# Patient Record
Sex: FEMALE | Race: WHITE | NOT HISPANIC OR LATINO | Employment: PART TIME | URBAN - METROPOLITAN AREA
[De-identification: names, ages, dates, MRNs, and addresses within clinical notes are randomized per-mention and may not be internally consistent; named-entity substitution may affect disease eponyms.]

---

## 2021-11-24 ENCOUNTER — HOSPITAL ENCOUNTER (EMERGENCY)
Facility: HOSPITAL | Age: 86
Discharge: HOME | End: 2021-11-24
Attending: EMERGENCY MEDICINE | Admitting: EMERGENCY MEDICINE

## 2021-11-24 ENCOUNTER — APPOINTMENT (OUTPATIENT)
Dept: CT IMAGING | Facility: HOSPITAL | Age: 86
End: 2021-11-24

## 2021-11-24 ENCOUNTER — APPOINTMENT (OUTPATIENT)
Dept: GENERAL RADIOLOGY | Facility: HOSPITAL | Age: 86
End: 2021-11-24

## 2021-11-24 ENCOUNTER — HOSPITAL ENCOUNTER (EMERGENCY)
Facility: HOSPITAL | Age: 86
End: 2021-11-24

## 2021-11-24 VITALS
HEIGHT: 60 IN | BODY MASS INDEX: 25.52 KG/M2 | SYSTOLIC BLOOD PRESSURE: 173 MMHG | OXYGEN SATURATION: 97 % | WEIGHT: 130 LBS | DIASTOLIC BLOOD PRESSURE: 47 MMHG | HEART RATE: 52 BPM | RESPIRATION RATE: 15 BRPM | TEMPERATURE: 98.7 F

## 2021-11-24 DIAGNOSIS — S02.2XXA CLOSED FRACTURE OF NASAL BONE, INITIAL ENCOUNTER: ICD-10-CM

## 2021-11-24 DIAGNOSIS — S00.03XA CONTUSION OF SCALP, INITIAL ENCOUNTER: ICD-10-CM
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
## 2021-11-24 DIAGNOSIS — S42.472A CLOSED DISPLACED TRANSCONDYLAR FRACTURE OF LEFT HUMERUS, INITIAL ENCOUNTER: ICD-10-CM

## 2021-11-24 PROCEDURE — 96374 THER/PROPH/DIAG INJ IV PUSH: CPT

## 2021-11-24 PROCEDURE — 70486 CT MAXILLOFACIAL W/O DYE: CPT

## 2021-11-24 PROCEDURE — 99283 EMERGENCY DEPT VISIT LOW MDM: CPT

## 2021-11-24 PROCEDURE — 73060 X-RAY EXAM OF HUMERUS: CPT

## 2021-11-24 PROCEDURE — 72125 CT NECK SPINE W/O DYE: CPT

## 2021-11-24 PROCEDURE — 70450 CT HEAD/BRAIN W/O DYE: CPT

## 2021-11-24 PROCEDURE — 25010000002 MORPHINE PER 10 MG: Performed by: EMERGENCY MEDICINE

## 2021-11-24 PROCEDURE — 25010000002 ONDANSETRON PER 1 MG: Performed by: EMERGENCY MEDICINE

## 2021-11-24 PROCEDURE — 99284 EMERGENCY DEPT VISIT MOD MDM: CPT

## 2021-11-24 PROCEDURE — 96375 TX/PRO/DX INJ NEW DRUG ADDON: CPT

## 2021-11-24 PROCEDURE — 73200 CT UPPER EXTREMITY W/O DYE: CPT

## 2021-11-24 PROCEDURE — 96376 TX/PRO/DX INJ SAME DRUG ADON: CPT

## 2021-11-24 PROCEDURE — 73030 X-RAY EXAM OF SHOULDER: CPT

## 2021-11-24 PROCEDURE — 0 MORPHINE SULFATE 4 MG/ML SOLUTION: Performed by: EMERGENCY MEDICINE

## 2021-11-24 RX ORDER — ONDANSETRON 2 MG/ML
4 INJECTION INTRAMUSCULAR; INTRAVENOUS ONCE
Status: COMPLETED | OUTPATIENT
Start: 2021-11-24 | End: 2021-11-24

## 2021-11-24 RX ORDER — MORPHINE SULFATE 4 MG/ML
2 INJECTION, SOLUTION INTRAMUSCULAR; INTRAVENOUS ONCE
Status: COMPLETED | OUTPATIENT
Start: 2021-11-24 | End: 2021-11-24

## 2021-11-24 RX ORDER — HYDROCODONE BITARTRATE AND ACETAMINOPHEN 5; 325 MG/1; MG/1
1 TABLET ORAL EVERY 6 HOURS PRN
Qty: 12 TABLET | Refills: 0 | Status: SHIPPED | OUTPATIENT
Start: 2021-11-24 | End: 2022-02-14

## 2021-11-24 RX ORDER — SODIUM CHLORIDE 0.9 % (FLUSH) 0.9 %
10 SYRINGE (ML) INJECTION AS NEEDED
Status: DISCONTINUED | OUTPATIENT
Start: 2021-11-24 | End: 2021-11-24 | Stop reason: HOSPADM

## 2021-11-24 RX ORDER — ONDANSETRON 4 MG/1
4 TABLET, ORALLY DISINTEGRATING ORAL EVERY 6 HOURS PRN
Qty: 12 TABLET | Refills: 0 | Status: SHIPPED | OUTPATIENT
Start: 2021-11-24 | End: 2022-02-14

## 2021-11-24 RX ADMIN — MORPHINE SULFATE 2 MG: 4 INJECTION INTRAVENOUS at 11:33

## 2021-11-24 RX ADMIN — ONDANSETRON 4 MG: 2 INJECTION INTRAMUSCULAR; INTRAVENOUS at 13:24

## 2021-11-24 RX ADMIN — MORPHINE SULFATE 2 MG: 4 INJECTION INTRAVENOUS at 14:28

## 2021-11-24 RX ADMIN — ONDANSETRON 4 MG: 2 INJECTION INTRAMUSCULAR; INTRAVENOUS at 11:32

## 2021-11-24 NOTE — ED NOTES
Pt fell at grocery store and landed on her shoulder. Pt states her dentures also hit the top of her mouth due to the impact. Pt did hit her head and had a bloody nose, no longer bleeding. Pt alert x4.     Priscila Yun RN  11/24/21 3737

## 2021-11-24 NOTE — DISCHARGE INSTRUCTIONS
Sling no use left arm ice packs  Follow-up with Dr. Flores on Monday at 3:45 PM.  Follow-up with [facility] for nasal fracture next week.  Return for uncontrolled pain fever vomiting altered mental status unable holding down any other new or worsening problems or concerns.  Zofran and Norco sent to your pharmacy.  Showell will make you sleepy he will be a fall risk it will make you constipated increase fluid fibers.  Return for any other new or worsening problems or concerns

## 2021-11-24 NOTE — ED PROVIDER NOTES
Subjective   Chief complaint fall left shoulder pain    History of present illness 88-year-old female was at the grocery store and she was reaching for some cream cheese when she lost her balance and fell landing on her left shoulder hitting her head and nose.  No loss of consciousness.  But complains of severe pain to the left shoulder.  No headache no neck pain numbness or tingling no chest or abdominal pain no syncope.  States she has been feeling fine otherwise.  No previous shoulder injury.  Pain is severe sharp stabbing rates down her arm continuous last couple hours worse with movement better with rest with the above associated symptoms.  Patient takes no anticoagulant          Review of Systems   Constitutional: Negative for chills and fever.   Respiratory: Negative for chest tightness and shortness of breath.    Cardiovascular: Negative for chest pain and leg swelling.   Gastrointestinal: Negative for abdominal pain and vomiting.   Endocrine: Negative for cold intolerance and heat intolerance.   Musculoskeletal: Positive for arthralgias. Negative for back pain and neck pain.   Skin: Negative for color change and rash.   Neurological: Negative for dizziness and headaches.   Psychiatric/Behavioral: Negative for agitation and behavioral problems.       No past medical history on file.    No Known Allergies    No past surgical history on file.    No family history on file.    Social History     Socioeconomic History   • Marital status:      Social history no tobacco alcohol or drug  Prior to Admission medications    Medication Sig Start Date End Date Taking? Authorizing Provider   HYDROcodone-acetaminophen (NORCO) 5-325 MG per tablet Take 1 tablet by mouth Every 6 (Six) Hours As Needed for Severe Pain . 11/24/21   Ronen Yi MD   ondansetron ODT (ZOFRAN-ODT) 4 MG disintegrating tablet Place 1 tablet on the tongue Every 6 (Six) Hours As Needed for Nausea. 11/24/21   Ronen Yi MD          Objective   Physical Exam  88-year-old female awake alert no acute distress HEENT extraocular muscles intact pupils equal and react there is no raccoon or colby sign there is no facial pain or mandible pain she has some mild nasal tenderness and some dried blood in the left nares but no septal hematomas noted bilateral.  Patient has a small superficial laceration to the inside upper lip no need for sutures patient has a some dentures in but does remove there is no other gum or palate injury.  Speech normal no trismus.  No direct cervical thoracic lumbar spine tenderness noted.  Trachea line without JVD or bruits chest wall nontender good breath sounds bilaterally heart regular without murmur abdomen soft without tenderness no bruising.  Pelvis to stay without pain extremities right arm and legs full range of motion no obvious deformity.  Patient has lot of pain to the left shoulder with some swelling noted but no pain to the elbow or to the wrist  strength normal.  She can move her wrist fingers thumb through all range of motion without difficulty she is distally neurovascular motor sensor intact including deltoid tricep forearm hand area no evidence of compartment syndrome.  The patient neurologic awake alert orientated x4 with [place] Coma Scale 15  Procedures           ED Course      No results found for this or any previous visit.  XR Shoulder 2+ View Left    Result Date: 11/24/2021  Acute comminuted mildly impacted and medially displaced humeral head and neck fracture. As described above. An obvious glenoid fracture is not seen on this exam. The remainder of the humerus is intact. No dislocation is demonstrated. Consider CT for better evaluation of the alignment of fracture fragments.  Electronically Signed By-Wilber Pineda DO On:11/24/2021 11:54 AM This report was finalized on [id number] by  Wilber Pineda DO.    XR Humerus Left    Result Date: 11/24/2021  Acute comminuted mildly impacted and  medially displaced humeral head and neck fracture. As described above. An obvious glenoid fracture is not seen on this exam. The remainder of the humerus is intact. No dislocation is demonstrated. Consider CT for better evaluation of the alignment of fracture fragments.  Electronically Signed By-Wilber Pineda DO On:11/24/2021 11:54 AM This report was finalized on [id number] by  Wilber Pineda DO.    CT Head Without Contrast    Result Date: 11/24/2021  Head: No evidence of trauma. No acute abnormality. No intracranial hemorrhage. Facial bones: Small left-sided nasal bone fracture, 1 mm displacement. This of indeterminate age. No acute abnormality elsewhere, no fracture elsewhere. Cervical spine: No fracture or other acute abnormality. Electronically Signed: Alvaro Bergman MD 11/24/2021 12:33 EST    CT Cervical Spine Without Contrast    Result Date: 11/24/2021  Head: No evidence of trauma. No acute abnormality. No intracranial hemorrhage. Facial bones: Small left-sided nasal bone fracture, 1 mm displacement. This of indeterminate age. No acute abnormality elsewhere, no fracture elsewhere. Cervical spine: No fracture or other acute abnormality. Electronically Signed: Alvaro Bergman MD 11/24/2021 12:33 EST    CT Facial Bones Without Contrast    Result Date: 11/24/2021  Head: No evidence of trauma. No acute abnormality. No intracranial hemorrhage. Facial bones: Small left-sided nasal bone fracture, 1 mm displacement. This of indeterminate age. No acute abnormality elsewhere, no fracture elsewhere. Cervical spine: No fracture or other acute abnormality. Electronically Signed: Alvaro Bergman MD 11/24/2021 12:33 EST    CT Upper Extremity Left Without Contrast    Result Date: 11/24/2021  Comminuted displaced fracture of the proximal humerus, as detailed above. Electronically Signed: Justin Najera MD 11/24/2021 14:06 EST    Medications   sodium chloride 0.9 % flush 10 mL (has no administration in time range)   Morphine sulfate  (PF) injection 2 mg (2 mg Intravenous Given 11/24/21 1133)   ondansetron (ZOFRAN) injection 4 mg (4 mg Intravenous Given 11/24/21 1132)   ondansetron (ZOFRAN) injection 4 mg (4 mg Intravenous Given 11/24/21 1324)   Morphine sulfate (PF) injection 2 mg (2 mg Intravenous Given 11/24/21 1428)                                            MDM  Number of Diagnoses or Management Options  Closed displaced transcondylar fracture of left humerus, initial encounter: new and requires workup  Closed fracture of nasal bone, initial encounter: new and requires workup  Contusion of scalp, initial encounter: new and requires workup  Fall, initial encounter: new and requires workup  Diagnosis management comments: Medical decision making.  Patient IV established placed on a monitor given morphine 2 IV and Zofran 4 IV she still remained pain and nausea he had that repeated and is helped tremendously.  The patient had a CT head without without acute findings other than a left-sided nasal bone fracture with 1 mm displacement.  The cervical spine without fracture no other facial fractures are noted CT of those were obtained.  The patient x-ray of the shoulder which showed a comminuted proximal humerus fracture without dislocation but I talked to Dr. Flores on the phone.  Requested the CT scan is obtained showed comminuted displaced fracture of proximal humerus some mild subluxation.  The patient was feeling much better we did talk about admission to the hospital for pain control orthopedic consultation.  This is versus outpatient after shared medical decision with her and her family.  Patient desires to go home as tomorrow is Thanksgiving.  She remained distal neurovascular sensory intact we talked about the fracture and the need for follow-up and possible surgical intervention.  We scheduled her appointment on Monday with Dr. Flores at 3:45 PM.  She was placed in a sling and swath she was referred [facility] next week for her nasal bone  fracture I talked to the family the patient greatly about what to return for she was discharged home for outpatient management follow-up stable otherwise unremarkable ER course.  He remained awake alert and oriented x3 with [place] Coma Scale 15       Amount and/or Complexity of Data Reviewed  Tests in the radiology section of CPT®: reviewed  Discuss the patient with other providers: yes    Risk of Complications, Morbidity, and/or Mortality  Presenting problems: moderate  Diagnostic procedures: moderate  Management options: moderate    Patient Progress  Patient progress: stable      Final diagnoses:   Fall, initial encounter   Contusion of scalp, initial encounter   Closed displaced transcondylar fracture of left humerus, initial encounter   Closed fracture of nasal bone, initial encounter       ED Disposition  ED Disposition     ED Disposition Condition Comment    Discharge Stable           Ronen Flores MD  [facility] [place] [address] [phone]    In 5 days  Monday 3:45 PM    [facility]  [address]  [facility] [address]  [phone]  In 1 week  For nasal fracture         Medication List      New Prescriptions    HYDROcodone-acetaminophen 5-325 MG per tablet  Commonly known as: NORCO  Take 1 tablet by mouth Every 6 (Six) Hours As Needed for Severe Pain .     ondansetron ODT 4 MG disintegrating tablet  Commonly known as: ZOFRAN-ODT  Place 1 tablet on the tongue Every 6 (Six) Hours As Needed for Nausea.           Where to Get Your Medications      These medications were sent to [facility]/pharmacy #[id number] - [place], [place] - [address] - [phone]  - [phone]  [address] [place] [address]    Phone: [phone]   · HYDROcodone-acetaminophen 5-325 MG per tablet  · ondansetron ODT 4 MG disintegrating tablet          Ronen Yi MD  11/24/21 5918

## 2021-11-30 ENCOUNTER — LAB (OUTPATIENT)
Dept: LAB | Facility: HOSPITAL | Age: 86
End: 2021-11-30

## 2021-11-30 ENCOUNTER — OFFICE VISIT (OUTPATIENT)
Dept: CARDIOLOGY | Facility: CLINIC | Age: 86
End: 2021-11-30

## 2021-11-30 VITALS
DIASTOLIC BLOOD PRESSURE: 84 MMHG | WEIGHT: 125 LBS | HEIGHT: 60 IN | BODY MASS INDEX: 24.54 KG/M2 | HEART RATE: 60 BPM | SYSTOLIC BLOOD PRESSURE: 136 MMHG

## 2021-11-30 DIAGNOSIS — S42.412S CLOSED SUPRACONDYLAR FRACTURE OF LEFT HUMERUS, SEQUELA: ICD-10-CM

## 2021-11-30 DIAGNOSIS — I25.10 CORONARY ARTERY DISEASE INVOLVING NATIVE CORONARY ARTERY OF NATIVE HEART WITHOUT ANGINA PECTORIS: ICD-10-CM

## 2021-11-30 DIAGNOSIS — I10 BENIGN ESSENTIAL HTN: ICD-10-CM

## 2021-11-30 DIAGNOSIS — Z01.810 PREOP CARDIOVASCULAR EXAM: Primary | ICD-10-CM

## 2021-11-30 DIAGNOSIS — E78.2 MIXED HYPERLIPIDEMIA: ICD-10-CM

## 2021-11-30 PROBLEM — S42.412A CLOSED SUPRACONDYLAR FRACTURE OF LEFT HUMERUS: Status: ACTIVE | Noted: 2021-11-30

## 2021-11-30 PROBLEM — M25.529 ELBOW JOINT PAIN: Status: ACTIVE | Noted: 2021-11-30

## 2021-11-30 PROBLEM — M19.90 OSTEOARTHRITIS: Status: ACTIVE | Noted: 2021-11-30

## 2021-11-30 PROBLEM — E78.5 HYPERLIPIDEMIA: Status: ACTIVE | Noted: 2021-11-30

## 2021-11-30 PROBLEM — H61.20 IMPACTED CERUMEN: Status: ACTIVE | Noted: 2021-11-30

## 2021-11-30 LAB
ANION GAP SERPL CALCULATED.3IONS-SCNC: 9.4 MMOL/L (ref 5–15)
BUN SERPL-MCNC: 28 MG/DL (ref 8–23)
BUN/CREAT SERPL: 31.5 (ref 7–25)
CALCIUM SPEC-SCNC: 9.2 MG/DL (ref 8.6–10.5)
CHLORIDE SERPL-SCNC: 99 MMOL/L (ref 98–107)
CO2 SERPL-SCNC: 31.6 MMOL/L (ref 22–29)
CREAT SERPL-MCNC: 0.89 MG/DL (ref 0.57–1)
DEPRECATED RDW RBC AUTO: 42.2 FL (ref 37–54)
ERYTHROCYTE [DISTWIDTH] IN BLOOD BY AUTOMATED COUNT: 12.9 % (ref 12.3–15.4)
GFR SERPL CREATININE-BSD FRML MDRD: 60 ML/MIN/1.73
GLUCOSE SERPL-MCNC: 106 MG/DL (ref 65–99)
HCT VFR BLD AUTO: 27.9 % (ref 34–46.6)
HGB BLD-MCNC: 9.6 G/DL (ref 12–15.9)
MCH RBC QN AUTO: 31.6 PG (ref 26.6–33)
MCHC RBC AUTO-ENTMCNC: 34.4 G/DL (ref 31.5–35.7)
MCV RBC AUTO: 91.8 FL (ref 79–97)
PLATELET # BLD AUTO: 219 10*3/MM3 (ref 140–450)
PMV BLD AUTO: 12.2 FL (ref 6–12)
POTASSIUM SERPL-SCNC: 3.8 MMOL/L (ref 3.5–5.2)
RBC # BLD AUTO: 3.04 10*6/MM3 (ref 3.77–5.28)
SARS-COV-2 ORF1AB RESP QL NAA+PROBE: NOT DETECTED
SODIUM SERPL-SCNC: 140 MMOL/L (ref 136–145)
WBC NRBC COR # BLD: 10.29 10*3/MM3 (ref 3.4–10.8)

## 2021-11-30 PROCEDURE — U0004 COV-19 TEST NON-CDC HGH THRU: HCPCS

## 2021-11-30 PROCEDURE — 80048 BASIC METABOLIC PNL TOTAL CA: CPT

## 2021-11-30 PROCEDURE — U0005 INFEC AGEN DETEC AMPLI PROBE: HCPCS

## 2021-11-30 PROCEDURE — 85027 COMPLETE CBC AUTOMATED: CPT

## 2021-11-30 PROCEDURE — 99204 OFFICE O/P NEW MOD 45 MIN: CPT | Performed by: NURSE PRACTITIONER

## 2021-11-30 PROCEDURE — 93000 ELECTROCARDIOGRAM COMPLETE: CPT | Performed by: NURSE PRACTITIONER

## 2021-11-30 PROCEDURE — C9803 HOPD COVID-19 SPEC COLLECT: HCPCS

## 2021-11-30 RX ORDER — METOPROLOL TARTRATE 50 MG/1
50 TABLET, FILM COATED ORAL 2 TIMES DAILY
COMMUNITY

## 2021-11-30 RX ORDER — HYDROCHLOROTHIAZIDE 25 MG/1
25 TABLET ORAL DAILY
COMMUNITY
End: 2022-02-14

## 2021-11-30 RX ORDER — LISINOPRIL 10 MG/1
10 TABLET ORAL 2 TIMES DAILY
COMMUNITY

## 2021-11-30 RX ORDER — MECLIZINE HYDROCHLORIDE 25 MG/1
12.5 TABLET ORAL AS NEEDED
COMMUNITY
End: 2021-11-30

## 2021-11-30 RX ORDER — DORZOLAMIDE HYDROCHLORIDE AND TIMOLOL MALEATE 20; 5 MG/ML; MG/ML
SOLUTION/ DROPS OPHTHALMIC
COMMUNITY
End: 2022-02-21

## 2021-11-30 RX ORDER — LATANOPROST 50 UG/ML
SOLUTION/ DROPS OPHTHALMIC
COMMUNITY

## 2021-11-30 RX ORDER — TIMOLOL MALEATE 5 MG/ML
SOLUTION/ DROPS OPHTHALMIC
COMMUNITY

## 2021-11-30 RX ORDER — ATORVASTATIN CALCIUM 40 MG/1
40 TABLET, FILM COATED ORAL NIGHTLY
COMMUNITY

## 2021-11-30 RX ORDER — ASPIRIN 81 MG/1
81 TABLET ORAL DAILY
COMMUNITY

## 2021-11-30 NOTE — PROGRESS NOTES
Cardiology Office New Patient Visit      Primary Care Provider:  Ranulfo Mims MD    Reason for Visit:     Preoperative cardiovascular risk assessment requested  Left humerus fracture  Coronary artery disease  Hypertension  Dyslipidemia      Subjective     CC: Left arm pain    History of Present Illness       Amanda Brown is a 88 y.o. female.  Patient is a very pleasant 88-year-old female who presents today for evaluation and requesting preoperative cardiovascular risk assessment prior to upcoming left humerus fracture repair that is scheduled for Thursday.    The patient was at a grocery store last week and was attempting to buy some cream cheese when she lost her balance and fell landing on her left shoulder and hitting her head and nose.  She denies any lightheadedness, dizziness, near syncope or syncope prior to the event.  She did not lose consciousness when she hit the ground.  She was complaining of severe pain in her arm and brought to the emergency room for evaluation.    Patient was found to have a comminuted displaced fracture of the proximal humerus.  In addition she was found to have a small left-sided nasal bone fracture with 1 mm of displacement    Patient was discharged home and had follow-up yesterday with Dr. Flores who scheduled her surgery for her left humerus fracture repair on Thursday this week.    The patient reports that she had four-vessel bypass surgery in 2009.  She denies any current or recent chest pain, dyspnea, PND, orthopnea, palpitations, near syncope, lower extremity edema or feelings of her heart racing.  She reports compliance with medical therapy.    She previously followed with Dr. Willard back but since he quit going to [place] she has not had any recent cardiac evaluation.    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    Prior to her bypass surgery she was having symptoms of severe shortness of breath with exertion and she says she has  not had any recent similar symptoms.    EKG is reviewed and shows sinus rhythm with a right bundle branch block.  Heart rate is 60        Past Medical History:   Diagnosis Date   • Coronary artery disease    • GERD (gastroesophageal reflux disease)    • Hyperlipidemia    • Hypertension    • PONV (postoperative nausea and vomiting)        Past Surgical History:   Procedure Laterality Date   • CARDIAC SURGERY  2009    CABG 4V         Current Outpatient Medications:   •  aspirin (aspirin) 81 MG EC tablet, Take 81 mg by mouth Daily. LD 11/29, Disp: , Rfl:   •  atorvastatin (LIPITOR) 40 MG tablet, Take 40 mg by mouth Every Night., Disp: , Rfl:   •  ciclopirox (PENLAC) 8 % solution, ciclopirox 8 % topical solution  PLEASE SEE ATTACHED FOR DETAILED DIRECTIONS, Disp: , Rfl:   •  dorzolamide-timolol (COSOPT) 22.3-6.8 MG/ML ophthalmic solution, dorzolamide 22.3 mg-timolol 6.8 mg/mL eye drops, Disp: , Rfl:   •  hydroCHLOROthiazide (HYDRODIURIL) 25 MG tablet, Take 25 mg by mouth Daily. Hold DOS, Disp: , Rfl:   •  HYDROcodone-acetaminophen (NORCO) 5-325 MG per tablet, Take 1 tablet by mouth Every 6 (Six) Hours As Needed for Severe Pain ., Disp: 12 tablet, Rfl: 0  •  latanoprost (XALATAN) 0.005 % ophthalmic solution, latanoprost 0.005 % eye drops, Disp: , Rfl:   •  lisinopril (PRINIVIL,ZESTRIL) 10 MG tablet, Take 10 mg by mouth 2 (Two) Times a Day. Hold 24 hours  LD 11/30, Disp: , Rfl:   •  metoprolol tartrate (LOPRESSOR) 50 MG tablet, Take 50 mg by mouth 2 (Two) Times a Day. Take DOS, Disp: , Rfl:   •  ondansetron ODT (ZOFRAN-ODT) 4 MG disintegrating tablet, Place 1 tablet on the tongue Every 6 (Six) Hours As Needed for Nausea., Disp: 12 tablet, Rfl: 0  •  timolol (TIMOPTIC) 0.5 % ophthalmic solution, timolol maleate 0.5 % eye drops, Disp: , Rfl:     Social History     Socioeconomic History   • Marital status:    Tobacco Use   • Smoking status: Never Smoker   • Smokeless tobacco: Never Used   Vaping Use   • Vaping Use:  "Never used   Substance and Sexual Activity   • Alcohol use: Never   • Drug use: Never   • Sexual activity: Defer       History reviewed. No pertinent family history.    The following portions of the patient's history were reviewed and updated as appropriate: allergies, current medications, past family history, past medical history, past social history, past surgical history and problem list.    Review of Systems   Constitutional: Negative for decreased appetite and diaphoresis.   HENT: Negative for congestion, hearing loss and nosebleeds.    Cardiovascular: Negative for chest pain, claudication, dyspnea on exertion, irregular heartbeat, leg swelling, near-syncope, orthopnea, palpitations, paroxysmal nocturnal dyspnea and syncope.   Respiratory: Negative for cough, shortness of breath and sleep disturbances due to breathing.    Endocrine: Negative for polyuria.   Hematologic/Lymphatic: Does not bruise/bleed easily.   Skin: Negative for itching and rash.   Musculoskeletal: Positive for arthritis, falls, joint pain and joint swelling. Negative for back pain, muscle weakness and myalgias.   Gastrointestinal: Negative for abdominal pain, change in bowel habit and nausea.   Genitourinary: Negative for dysuria, flank pain, frequency and hesitancy.   Neurological: Negative for dizziness, tremors and weakness.   Psychiatric/Behavioral: Negative for altered mental status. The patient does not have insomnia.        /84   Pulse 60   Ht 152.4 cm (60\")   Wt 56.7 kg (125 lb)   BMI 24.41 kg/m² .    Body mass index is 24.41 kg/m².    Objective     Vitals reviewed.   Constitutional:       General: Not in acute distress.     Appearance: Normal appearance. Well-developed.   Eyes:      Pupils: Pupils are equal, round, and reactive to light.   HENT:      Head: Normocephalic and atraumatic.   Neck:      Vascular: No JVD.   Pulmonary:      Effort: Pulmonary effort is normal.      Breath sounds: Normal breath sounds. "   Cardiovascular:      Normal rate. Regular rhythm.   Pulses:     Intact distal pulses.   Edema:     Peripheral edema absent.   Abdominal:      General: There is no distension.      Palpations: Abdomen is soft.      Tenderness: There is no abdominal tenderness.   Musculoskeletal: Normal range of motion.      Left upper arm: Swelling present.      Cervical back: Normal range of motion and neck supple. Skin:     General: Skin is warm and dry.   Neurological:      Mental Status: Alert and oriented to person, place, and time.             ECG 12 Lead    Date/Time: 11/30/2021 3:32 PM  Performed by: Olga Lidia Russell APRN  Authorized by: Olga Lidia Russell APRN   Comparison: not compared with previous ECG   Rhythm: sinus rhythm  BPM: 60  Conduction: right bundle branch block  ST Segments: ST segments normal  T Waves: T waves normal    Clinical impression: abnormal EKG                  Diagnoses and all orders for this visit:    1. Preop cardiovascular exam (Primary)  Comments:  Preoperative cardiovascular risk assessment requested prior to upcoming surgical repair of a left humerus fracture    2. Closed supracondylar fracture of left humerus, sequela    3. Coronary artery disease involving native coronary artery of native heart without angina pectoris  Comments:  Patient had multivessel bypass surgery in 2009.  She has not had recent cardiac follow-up.  No signs or symptoms to suggest unstable angina    4. Benign essential HTN  Comments:  Stable on current medical therapy    5. Mixed hyperlipidemia  Comments:  Treated with statin therapy              MEDICAL DECISION MAKING:          Patient is here requesting preoperative cardiovascular risk assessment for repair of comminuted displaced proximal humerus left fracture.  It is scheduled tentatively for Thursday.    The patient has known coronary artery disease.  She is having no signs or symptoms to suggest unstable angina, MI or CHF.    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  She is having none of the similar symptoms she had prior to her bypass surgery in 2009.    EKG shows sinus rhythm with a right bundle branch block there are no ST or T wave segment abnormalities.    The patient has not had any recent noninvasive ischemic evaluation.    However due to no recent signs or symptoms to suggest heart failure, unstable angina or recent MI I do think she is an acceptable risk to proceed with surgery for her displaced comminuted proximal humerus fracture which is scheduled for Thursday.    I have however encouraged her to follow-up with Dr. Aceves in our [place] clinic in a few months postoperatively to get back in a schedule of regular follow-up with cardiology and to consider noninvasive ischemic evaluation at that time.    This was discussed with the patient and her family who are in agreement to this plan.

## 2021-12-02 ENCOUNTER — OFFICE VISIT (OUTPATIENT)
Dept: ORTHOPEDIC SURGERY | Facility: CLINIC | Age: 86
End: 2021-12-02

## 2021-12-02 VITALS
WEIGHT: 132.8 LBS | SYSTOLIC BLOOD PRESSURE: 156 MMHG | HEART RATE: 63 BPM | BODY MASS INDEX: 26.07 KG/M2 | DIASTOLIC BLOOD PRESSURE: 75 MMHG | HEIGHT: 60 IN

## 2021-12-02 DIAGNOSIS — S42.292A OTHER CLOSED DISPLACED FRACTURE OF PROXIMAL END OF LEFT HUMERUS, INITIAL ENCOUNTER: ICD-10-CM

## 2021-12-02 DIAGNOSIS — M25.512 ACUTE PAIN OF LEFT SHOULDER: Primary | ICD-10-CM

## 2021-12-02 PROCEDURE — 99203 OFFICE O/P NEW LOW 30 MIN: CPT | Performed by: ORTHOPAEDIC SURGERY

## 2021-12-02 RX ORDER — ASPIRIN 81 MG/1
TABLET, CHEWABLE ORAL
COMMUNITY
End: 2022-02-21 | Stop reason: SDUPTHER

## 2021-12-02 RX ORDER — TIMOLOL MALEATE 2.5 MG/ML
SOLUTION OPHTHALMIC
COMMUNITY
End: 2022-02-21 | Stop reason: SDUPTHER

## 2021-12-02 RX ORDER — HYDROCODONE BITARTRATE AND ACETAMINOPHEN 5; 325 MG/1; MG/1
TABLET ORAL EVERY 6 HOURS SCHEDULED
COMMUNITY
Start: 2021-11-29 | End: 2022-02-14

## 2021-12-02 RX ORDER — ATORVASTATIN CALCIUM 10 MG/1
TABLET, FILM COATED ORAL
COMMUNITY
End: 2022-02-21

## 2021-12-02 NOTE — PROGRESS NOTES
"     Patient ID: Amanda Brown is a 88 y.o. female.    Chief Complaint:    Chief Complaint   Patient presents with   • Left Shoulder - Initial Evaluation       HPI:  This is an 88-year-old female who suffered a fall with a proximal humerus fracture on the left side on November 24.  She has had actually bit of improvement in her pain since that time.  She is in a sling.  Has history of coronary bypass 12 years ago no active complaints  Past Medical History:   Diagnosis Date   • Coronary artery disease    • GERD (gastroesophageal reflux disease)    • Hyperlipidemia    • Hypertension    • PONV (postoperative nausea and vomiting)        Past Surgical History:   Procedure Laterality Date   • CARDIAC SURGERY  2009    CABG 4V       History reviewed. No pertinent family history.       Social History     Occupational History   • Not on file   Tobacco Use   • Smoking status: Never Smoker   • Smokeless tobacco: Never Used   Vaping Use   • Vaping Use: Never used   Substance and Sexual Activity   • Alcohol use: Never   • Drug use: Never   • Sexual activity: Defer      Review of Systems   Cardiovascular: Negative for chest pain.   Musculoskeletal: Positive for arthralgias.       Objective:    /75   Pulse 63   Ht 152.4 cm (60\")   Wt 60.2 kg (132 lb 12.8 oz)   BMI 25.94 kg/m²     Physical Examination:  Left arm demonstrates bruising throughout the entire left arm with 1+ edema.  Compartments are soft.  Range of motion deferred, sensory and motor exam are intact all distributions. Radial pulse is palpable and capillary refill is less than two seconds to all digits.    Imaging:  Initial x-rays along with CT scan reviewed, my interpretation is of a displaced comminuted three-part proximal humerus fracture, repeat x-rays today    left Shoulder X-Ray  Indication: Fall left shoulder pain  AP Y and Lateral views  Findings: Continued displaced three-part proximal humerus fracture no significant change in alignment or callus " formation  no bony lesion  Soft tissues normal  not examined joint spaces  Hardware appropriately positioned not applicable      yes prior studies available for comparison    Assessment:  Displaced left proximal humerus fracture    Plan:  Treatment options conservative or surgical were discussed.  There is a chance even with displacement of fracture union however patients can be left with pain and stiffness in diminished function.  Surgery at her age with history of heart disease and bypass as well as osteoporosis was discussed at length today as it relates to complications.    They would like to think about their options and let me know how they would like to proceed.  Surgery would involve reverse shoulder arthroplasty.  They desire conservative treatment x-ray in 2 weeks      Procedures         Disclaimer: Please note that areas of this note were completed with computer voice recognition software.  Quite often unanticipated grammatical, syntax, homophones, and other interpretive errors are inadvertently transcribed by the computer software. Please excuse any errors that have escaped final proofreading.

## 2021-12-06 ENCOUNTER — TELEPHONE (OUTPATIENT)
Dept: ORTHOPEDIC SURGERY | Facility: CLINIC | Age: 86
End: 2021-12-06

## 2021-12-06 DIAGNOSIS — S42.292A OTHER CLOSED DISPLACED FRACTURE OF PROXIMAL END OF LEFT HUMERUS, INITIAL ENCOUNTER: Primary | ICD-10-CM

## 2021-12-06 RX ORDER — TRAMADOL HYDROCHLORIDE 50 MG/1
50 TABLET ORAL EVERY 6 HOURS PRN
Qty: 28 TABLET | Refills: 0 | Status: SHIPPED | OUTPATIENT
Start: 2021-12-06 | End: 2022-02-21

## 2021-12-06 NOTE — TELEPHONE ENCOUNTER
"Provider: DR. ROBERTSON  Caller: ZEINAB BARRY  Relationship to Patient:   Pharmacy: [facility]/pharmacy #[id number] - [place], [place] - [address] - [phone]  - [phone]   [phone]  Phone Number: [phone]  Reason for Call: HAD TO SCHEDULE PATIENT FOR 01/03/21 DUE TO AVAILABILITY AND THEIR OWN SCHEDULE AS WELL. PER OFFICE VISIT NOTE WITH  12/02/21  \"They would like to think about their options and let me know how they would like to proceed.  Surgery would involve reverse shoulder arthroplasty. They desire conservative treatment x-ray in 2 weeks\" SEEING IF DR. ROBERTSON COULD FIT HER INTO HIS SCHEDULE AROUND 12/16/21, PLEASE ADVISE. PATIENT ALSO  REQUESTING CALL BACK, WANTING TO KNOW IF THEY COULD BE PRESCRIBED SOME PAIN MEDICATION (NOT AS STRONG AS HYDROCODONE) FOR THE TIME BEING.  "

## 2021-12-13 ENCOUNTER — PATIENT ROUNDING (BHMG ONLY) (OUTPATIENT)
Dept: CARDIOLOGY | Facility: CLINIC | Age: 86
End: 2021-12-13

## 2021-12-13 NOTE — PROGRESS NOTES
December 13, 2021    Hello, may I speak with Amanda Brown?    My name is  Leda Maharaj    I am  with [facility] CARDIOLOGY  [address] [place] [address].    Before we get started may I verify your date of birth? 6/10/1933    I am calling to officially welcome you to our practice and ask about your recent visit. Is this a good time to talk? yes    Tell me about your visit with us. What things went well?  Office did a good job       We're always looking for ways to make our patients' experiences even better. Do you have recommendations on ways we may improve?  no everything was perfect    Overall were you satisfied with your first visit to our practice? yes       I appreciate you taking the time to speak with me today. Is there anything else I can do for you? no      Thank you, and have a great day.

## 2022-01-03 ENCOUNTER — OFFICE VISIT (OUTPATIENT)
Dept: ORTHOPEDIC SURGERY | Facility: CLINIC | Age: 87
End: 2022-01-03

## 2022-01-03 VITALS
HEIGHT: 60 IN | WEIGHT: 132 LBS | SYSTOLIC BLOOD PRESSURE: 168 MMHG | HEART RATE: 63 BPM | DIASTOLIC BLOOD PRESSURE: 74 MMHG | BODY MASS INDEX: 25.91 KG/M2

## 2022-01-03 DIAGNOSIS — S42.292A OTHER CLOSED DISPLACED FRACTURE OF PROXIMAL END OF LEFT HUMERUS, INITIAL ENCOUNTER: Primary | ICD-10-CM

## 2022-01-03 DIAGNOSIS — S42.292D OTHER CLOSED DISPLACED FRACTURE OF PROXIMAL END OF LEFT HUMERUS WITH ROUTINE HEALING, SUBSEQUENT ENCOUNTER: ICD-10-CM

## 2022-01-03 PROCEDURE — 99213 OFFICE O/P EST LOW 20 MIN: CPT | Performed by: ORTHOPAEDIC SURGERY

## 2022-01-03 NOTE — PROGRESS NOTES
"     Patient ID: Amanda Brown is a 88 y.o. female.  Left shoulder pain  Proximal humerus fracture surgery November 24, 2021 treated conservatively pain much improved    Review of Systems:    Left shoulder pain improved    Objective:    /74   Pulse 63   Ht 152.4 cm (60\")   Wt 59.9 kg (132 lb)   BMI 25.78 kg/m²     Physical Examination:  Left shoulder demonstrates intact skin fracture moves as a unit with elevation to 90 external rotation 20       Imaging:     left Shoulder X-Ray  Indication: Left proximal humerus fracture  AP Y views  Findings: unchanged fracture alignment faint callus progression  no bony lesion  Soft tissues normal  not examined joint spaces  Hardware appropriately positioned not applicable      yes prior studies available for comparison  Assessment:    Healing left proximal humerus fracture    Plan:   Wean sling, begin home exercise program as demonstrated in the office today x-ray in 4 to 6 weeks      Procedures          Disclaimer: Part of this note may be an electronic transcription/translation of spoken language to printed text using the Dragon Dictation System  "

## 2022-02-14 ENCOUNTER — OFFICE VISIT (OUTPATIENT)
Dept: ORTHOPEDIC SURGERY | Facility: CLINIC | Age: 87
End: 2022-02-14

## 2022-02-14 VITALS
DIASTOLIC BLOOD PRESSURE: 80 MMHG | SYSTOLIC BLOOD PRESSURE: 150 MMHG | HEIGHT: 60 IN | HEART RATE: 81 BPM | BODY MASS INDEX: 25.91 KG/M2 | WEIGHT: 132 LBS

## 2022-02-14 DIAGNOSIS — S42.292A OTHER CLOSED DISPLACED FRACTURE OF PROXIMAL END OF LEFT HUMERUS, INITIAL ENCOUNTER: Primary | ICD-10-CM

## 2022-02-14 DIAGNOSIS — S42.292D OTHER CLOSED DISPLACED FRACTURE OF PROXIMAL END OF LEFT HUMERUS WITH ROUTINE HEALING, SUBSEQUENT ENCOUNTER: ICD-10-CM

## 2022-02-14 PROCEDURE — 99212 OFFICE O/P EST SF 10 MIN: CPT | Performed by: ORTHOPAEDIC SURGERY

## 2022-02-14 NOTE — PROGRESS NOTES
"     Patient ID: Amanda Brown is a 88 y.o. female.  Left shoulder pain  Proximal humerus fracture surgery November 24, 2021 treated conservatively pain much improved  Pain minimal  Review of Systems:    Left shoulder pain mild    Objective:    /80   Pulse 81   Ht 152.4 cm (60\")   Wt 59.9 kg (132 lb)   BMI 25.78 kg/m²     Physical Examination:     Left shoulder no tenderness of the proximal humerus passive elevation 100 external rotation 20 no pain or crepitance    Imaging:   left Shoulder X-Ray  Indication: Left proximal humerus fracture  AP Y and Lateral views  Findings: Unchanged fracture alignment with complete fracture healing  no bony lesion  Soft tissues normal  not examined joint spaces  Hardware appropriately positioned not applicable      yes prior studies available for compariso    Assessment:    Healed proximal humerus fracture    Plan:   Activity as tolerated and see me as needed      Procedures          Disclaimer: Part of this note may be an electronic transcription/translation of spoken language to printed text using the Dragon Dictation System  "

## 2022-02-21 ENCOUNTER — OFFICE VISIT (OUTPATIENT)
Dept: CARDIOLOGY | Facility: CLINIC | Age: 87
End: 2022-02-21

## 2022-02-21 VITALS
OXYGEN SATURATION: 97 % | BODY MASS INDEX: 25.72 KG/M2 | WEIGHT: 131 LBS | DIASTOLIC BLOOD PRESSURE: 63 MMHG | HEIGHT: 60 IN | HEART RATE: 63 BPM | SYSTOLIC BLOOD PRESSURE: 148 MMHG

## 2022-02-21 DIAGNOSIS — I10 BENIGN ESSENTIAL HTN: Primary | ICD-10-CM

## 2022-02-21 DIAGNOSIS — R94.31 ABNORMAL EKG: ICD-10-CM

## 2022-02-21 DIAGNOSIS — E78.2 MIXED HYPERLIPIDEMIA: ICD-10-CM

## 2022-02-21 DIAGNOSIS — I25.10 CORONARY ARTERY DISEASE INVOLVING NATIVE CORONARY ARTERY OF NATIVE HEART WITHOUT ANGINA PECTORIS: ICD-10-CM

## 2022-02-21 PROBLEM — S42.213A CLOSED FRACTURE OF SURGICAL NECK OF HUMERUS: Status: ACTIVE | Noted: 2022-02-21

## 2022-02-21 PROCEDURE — 99214 OFFICE O/P EST MOD 30 MIN: CPT | Performed by: INTERNAL MEDICINE

## 2022-02-21 NOTE — PROGRESS NOTES
Date of Office Visit: 2022  Encounter Provider: Dr. Deep Aceves    Place of Service: [facility] CARDIOLOGY [place]  Patient Name: Amanda Brown  :6/10/1933  Ranulfo Mims MD    Chief Complaint   Patient presents with   • Hypertension   • Coronary Artery Disease   • Abnormal ECG   • Hyperlipidemia     History of Present Illness:    I am pleased to see Mrs. Brown in my office today as a follow-up.    As you know, patient is a 88-year-old white female whose past medical history significant for hypertension, hyperlipidemia, CAD, CABG, who came today for follow-up.    In 2021, patient had fall due to tripping at local [employer] store.  She broke her left shoulder/humerus.  Patient was recommended to have surgery but she was reluctant and was treated conservatively.  Patient follows with Dr. Aguila.  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    Patient is otherwise doing well.  She denies any symptom of chest pain or tightness or heaviness.  No orthopnea PND no syncope or presyncope.  She is very restricted at her left shoulder movement.  Range of motion is restricted.  Patient denies any syncope.  No dizziness or lightheadedness or palpitation.    At this stage I would recommend that patient continue current treatment.  I would consider echocardiogram and stress test in future.        Past Medical History:   Diagnosis Date   • Coronary artery disease    • Fracture, humerus     left   • GERD (gastroesophageal reflux disease)    • Hyperlipidemia    • Hypertension    • PONV (postoperative nausea and vomiting)          Past Surgical History:   Procedure Laterality Date   • CARDIAC SURGERY      CABG 4V           Current Outpatient Medications:   •  aspirin (aspirin) 81 MG EC tablet, Take 81 mg by mouth Daily. LD , Disp: , Rfl:   •  atorvastatin (LIPITOR) 40 MG tablet, Take 40 mg by mouth Every Night., Disp: , Rfl:   •  latanoprost (XALATAN) 0.005 % ophthalmic solution,  "latanoprost 0.005 % eye drops, Disp: , Rfl:   •  lisinopril (PRINIVIL,ZESTRIL) 10 MG tablet, Take 10 mg by mouth 2 (Two) Times a Day. Hold 24 hours  LD 11/30, Disp: , Rfl:   •  metoprolol tartrate (LOPRESSOR) 50 MG tablet, Take 50 mg by mouth 2 (Two) Times a Day. Take DOS, Disp: , Rfl:   •  timolol (TIMOPTIC) 0.5 % ophthalmic solution, timolol maleate 0.5 % eye drops, Disp: , Rfl:       Social History     Socioeconomic History   • Marital status:    Tobacco Use   • Smoking status: Never Smoker   • Smokeless tobacco: Never Used   Vaping Use   • Vaping Use: Never used   Substance and Sexual Activity   • Alcohol use: Never   • Drug use: Never   • Sexual activity: Defer         Review of Systems   Constitutional: Negative for chills and fever.   HENT: Negative for ear discharge and nosebleeds.    Eyes: Negative for discharge and redness.   Cardiovascular: Negative for chest pain, orthopnea, palpitations, paroxysmal nocturnal dyspnea and syncope.   Respiratory: Positive for shortness of breath. Negative for cough and wheezing.    Endocrine: Negative for heat intolerance.   Skin: Negative for rash.   Musculoskeletal: Positive for arthritis and joint pain. Negative for myalgias.   Gastrointestinal: Negative for abdominal pain, melena, nausea and vomiting.   Genitourinary: Negative for dysuria and hematuria.   Neurological: Negative for dizziness, light-headedness, numbness and tremors.   Psychiatric/Behavioral: Negative for depression. The patient is not nervous/anxious.        Procedures    Procedures    No orders to display           Objective:    /63   Pulse 63   Ht 152.4 cm (60\")   Wt 59.4 kg (131 lb)   SpO2 97%   BMI 25.58 kg/m²         Constitutional:       Appearance: Well-developed.   Eyes:      General: No scleral icterus.        Right eye: No discharge.   HENT:      Head: Normocephalic and atraumatic.   Neck:      Thyroid: No thyromegaly.      Lymphadenopathy: No cervical adenopathy. "   Pulmonary:      Effort: Pulmonary effort is normal. No respiratory distress.      Breath sounds: Normal breath sounds. No wheezing. No rales.   Cardiovascular:      Normal rate. Regular rhythm.      No gallop.   Abdominal:      Tenderness: There is no abdominal tenderness.   Skin:     Findings: No erythema or rash.   Neurological:      Mental Status: Alert and oriented to person, place, and time.             Assessment:       Diagnosis Plan   1. Benign essential HTN     2. Mixed hyperlipidemia     3. Abnormal EKG     4. Coronary artery disease involving native coronary artery of native heart without angina pectoris              Plan:       MDM:    1.  CAD:    I would consider stress test in 6 months    2.  Hypertension:    Blood pressure is slightly high but I accept this in this patient because of risk of fall otherwise continue monitoring at home    3.  Hyperlipidemia:    Patient is on Lipitor.  Continue Lipitor for now repeat lipid panel testing in future    4.  Bradycardia:    Patient has relative bradycardia continue to observe.

## 2022-08-22 ENCOUNTER — OFFICE VISIT (OUTPATIENT)
Dept: CARDIOLOGY | Facility: CLINIC | Age: 87
End: 2022-08-22

## 2022-08-22 VITALS
HEART RATE: 52 BPM | DIASTOLIC BLOOD PRESSURE: 64 MMHG | OXYGEN SATURATION: 99 % | BODY MASS INDEX: 26.31 KG/M2 | SYSTOLIC BLOOD PRESSURE: 144 MMHG | HEIGHT: 60 IN | WEIGHT: 134 LBS

## 2022-08-22 DIAGNOSIS — I25.10 CORONARY ARTERIOSCLEROSIS: ICD-10-CM

## 2022-08-22 DIAGNOSIS — E78.2 MIXED HYPERLIPIDEMIA: ICD-10-CM

## 2022-08-22 DIAGNOSIS — E78.5 DYSLIPIDEMIA: ICD-10-CM

## 2022-08-22 DIAGNOSIS — I25.10 CORONARY ARTERY DISEASE INVOLVING NATIVE CORONARY ARTERY OF NATIVE HEART WITHOUT ANGINA PECTORIS: ICD-10-CM

## 2022-08-22 DIAGNOSIS — I10 BENIGN ESSENTIAL HTN: Primary | ICD-10-CM

## 2022-08-22 PROCEDURE — 99213 OFFICE O/P EST LOW 20 MIN: CPT | Performed by: INTERNAL MEDICINE

## 2022-08-22 RX ORDER — HYDROCHLOROTHIAZIDE 25 MG/1
TABLET ORAL
COMMUNITY
Start: 2022-07-14

## 2022-08-22 NOTE — PROGRESS NOTES
Date of Office Visit: 2022  Encounter Provider: Dr. Deep Aceves    Place of Service: [facility] CARDIOLOGY [place]  Patient Name: Amanda Brown  :6/10/1933  Ranulfo Mims MD    Chief Complaint   Patient presents with   • Hypertension   • Coronary Artery Disease   • Hyperlipidemia     History of Present Illness    I am pleased to see Mrs. Brown in my office today as a follow-up.    As you know, patient is a 89-year-old white female whose past medical history significant for hypertension, hyperlipidemia, CAD, CABG, who came today for follow-up.    In 2021, patient had fall due to tripping at local [employer] store.  She broke her left shoulder/humerus.  Patient was recommended to have surgery but she was reluctant and was treated conservatively.  Patient follows with Dr. Aguila.  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    Patient came today for yearly follow-up.  Patient denies any symptom of chest pain or tightness.  Patient denies any orthopnea, PND, syncope or presyncope.  No leg edema noted.  Patient is fairly active and does not reproduce any symptoms with exertion    EKG showed normal sinus rhythm with heart rate of 56 bpm right bundle branch block is noted.    Overall patient is doing well.  Her blood pressure is slightly high but I would except his blood pressure because of her risk of fall otherwise.  Patient has relative bradycardia but she is asymptomatic continue to observe if bradycardia worsens I would decrease the dose of beta-blocker.  I suspect underlying sick sinus syndrome.  Consider stress test in future if patient is willing to proceed with that.          Past Medical History:   Diagnosis Date   • Coronary artery disease    • Fracture, humerus     left   • GERD (gastroesophageal reflux disease)    • Hyperlipidemia    • Hypertension    • PONV (postoperative nausea and vomiting)          Past Surgical History:   Procedure Laterality Date   • CARDIAC  SURGERY  2009    CABG 4V           Current Outpatient Medications:   •  aspirin 81 MG EC tablet, Take 81 mg by mouth Daily. LD 11/29, Disp: , Rfl:   •  atorvastatin (LIPITOR) 40 MG tablet, Take 40 mg by mouth Every Night., Disp: , Rfl:   •  latanoprost (XALATAN) 0.005 % ophthalmic solution, latanoprost 0.005 % eye drops, Disp: , Rfl:   •  lisinopril (PRINIVIL,ZESTRIL) 10 MG tablet, Take 10 mg by mouth 2 (Two) Times a Day. Hold 24 hours  LD 11/30, Disp: , Rfl:   •  metoprolol tartrate (LOPRESSOR) 50 MG tablet, Take 50 mg by mouth 2 (Two) Times a Day. Take DOS, Disp: , Rfl:   •  timolol (TIMOPTIC) 0.5 % ophthalmic solution, timolol maleate 0.5 % eye drops, Disp: , Rfl:   •  hydroCHLOROthiazide (HYDRODIURIL) 25 MG tablet, , Disp: , Rfl:       Social History     Socioeconomic History   • Marital status:    Tobacco Use   • Smoking status: Never Smoker   • Smokeless tobacco: Never Used   Vaping Use   • Vaping Use: Never used   Substance and Sexual Activity   • Alcohol use: Never   • Drug use: Never   • Sexual activity: Defer         Review of Systems   Constitutional: Negative for chills and fever.   HENT: Negative for ear discharge and nosebleeds.    Eyes: Negative for discharge and redness.   Cardiovascular: Negative for chest pain, orthopnea, palpitations, paroxysmal nocturnal dyspnea and syncope.   Respiratory: Positive for shortness of breath. Negative for cough and wheezing.    Endocrine: Negative for heat intolerance.   Skin: Negative for rash.   Musculoskeletal: Positive for arthritis, back pain and joint pain. Negative for myalgias.   Gastrointestinal: Negative for abdominal pain, melena, nausea and vomiting.   Genitourinary: Negative for dysuria and hematuria.   Neurological: Negative for dizziness, light-headedness, numbness and tremors.   Psychiatric/Behavioral: Negative for depression. The patient is not nervous/anxious.        Procedures    Procedures    No orders to display           Objective:    BP  "144/64   Pulse 52   Ht 153.4 cm (60.39\")   Wt 60.8 kg (134 lb)   SpO2 99%   BMI 25.83 kg/m²         Constitutional:       Appearance: Well-developed.   Eyes:      General: No scleral icterus.        Right eye: No discharge.   HENT:      Head: Normocephalic and atraumatic.   Neck:      Thyroid: No thyromegaly.      Lymphadenopathy: No cervical adenopathy.   Pulmonary:      Effort: Pulmonary effort is normal. No respiratory distress.      Breath sounds: Normal breath sounds. No wheezing. No rales.   Cardiovascular:      Normal rate. Regular rhythm.      No gallop.   Abdominal:      Tenderness: There is no abdominal tenderness.   Skin:     Findings: No erythema or rash.   Neurological:      Mental Status: Alert and oriented to person, place, and time.             Assessment:       Diagnosis Plan   1. Benign essential HTN     2. Mixed hyperlipidemia     3. Coronary arteriosclerosis     4. Dyslipidemia     5. Coronary artery disease involving native coronary artery of native heart without angina pectoris              Plan:       MDM:    1.  CAD:    Clinically patient is without any symptom of angina pectoris or congestive heart failure.  I would recommend observation    2.  Hypertension:    Blood pressure is slightly high but I would expect this elevation of blood pressure because of risk of fall otherwise.    3.  Hyperlipidemia:    Patient is on Lipitor.  Repeat lipid panel testing in future    4.  Bradycardia:    Patient has sinus bradycardia.  Patient is asymptomatic recommend observation  "

## 2023-08-11 NOTE — PROGRESS NOTES
Date of Office Visit: 2023  Encounter Provider: Dr. Deep Aceves  Place of Service: [facility] CARDIOLOGY [place]  Patient Name: Amanda Brown  :6/10/1933  Ranulfo Mims MD    Chief Complaint   Patient presents with    Coronary Artery Disease    Hypertension    Follow-up    Hyperlipidemia     History of Present Illness    I am pleased to see Mrs. Brown in my office today as a follow-up.    As you know, patient is a 89-year-old white female whose past medical history significant for hypertension, hyperlipidemia, CAD, CABG, who came today for follow-up.    In 2021, patient had fall due to tripping at local [employer] store.  She broke her left shoulder/humerus.  Patient was recommended to have surgery but she was reluctant and was treated conservatively.  Patient follows with Dr. Aguila.  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    Patient came today for yearly follow-up.  Overall she is doing well.  She has occasional dizziness on standing up.  She denies any chest pain or tightness or heaviness.  No orthopnea PND no syncope or presyncope.  No leg edema noted.    EKG showed sinus bradycardia with intraventricular conduction delay.  Right bundle branch block is noted.  No change from previous EKG    Patient has relative bradycardia.  I would recommend to change metoprolol to 50 mg once a day instead of twice a day.  Patient is advised to monitor the blood pressure if needed I would consider hydralazine in future.      Past Medical History:   Diagnosis Date    Coronary artery disease     Fracture, humerus     left    GERD (gastroesophageal reflux disease)     Hyperlipidemia     Hypertension     PONV (postoperative nausea and vomiting)          Past Surgical History:   Procedure Laterality Date    CARDIAC SURGERY      CABG 4V           Current Outpatient Medications:     aspirin 81 MG EC tablet, Take 1 tablet by mouth Daily. LD , Disp: , Rfl:     atorvastatin  "(LIPITOR) 40 MG tablet, Take 1 tablet by mouth Every Night., Disp: , Rfl:     hydroCHLOROthiazide (HYDRODIURIL) 25 MG tablet, , Disp: , Rfl:     latanoprost (XALATAN) 0.005 % ophthalmic solution, latanoprost 0.005 % eye drops, Disp: , Rfl:     lisinopril (PRINIVIL,ZESTRIL) 20 MG tablet, Take 1 tablet by mouth 2 (Two) Times a Day., Disp: , Rfl:     metoprolol tartrate (LOPRESSOR) 50 MG tablet, Take 1 tablet by mouth Daily. Take DOS, Disp: , Rfl:     timolol (TIMOPTIC) 0.5 % ophthalmic solution, timolol maleate 0.5 % eye drops, Disp: , Rfl:       Social History     Socioeconomic History    Marital status:    Tobacco Use    Smoking status: Never    Smokeless tobacco: Never   Vaping Use    Vaping Use: Never used   Substance and Sexual Activity    Alcohol use: Never    Drug use: Never    Sexual activity: Defer         Review of Systems   Constitutional: Negative for chills and fever.   HENT:  Negative for ear discharge and nosebleeds.    Eyes:  Negative for discharge and redness.   Cardiovascular:  Negative for chest pain, orthopnea, palpitations, paroxysmal nocturnal dyspnea and syncope.   Respiratory:  Negative for cough, shortness of breath and wheezing.    Endocrine: Negative for heat intolerance.   Skin:  Negative for rash.   Musculoskeletal:  Positive for arthritis, back pain and joint pain. Negative for myalgias.   Gastrointestinal:  Negative for abdominal pain, melena, nausea and vomiting.   Genitourinary:  Negative for dysuria and hematuria.   Neurological:  Negative for dizziness, light-headedness, numbness and tremors.   Psychiatric/Behavioral:  Negative for depression. The patient is not nervous/anxious.      Procedures    Procedures    No orders to display           Objective:    /65 (BP Location: Right arm, Patient Position: Sitting, Cuff Size: Large Adult)   Pulse 58   Ht 153.4 cm (60.39\")   Wt 60.3 kg (133 lb)   SpO2 98%   BMI 25.64 kg/mý         Constitutional:       Appearance: " Well-developed.   Eyes:      General: No scleral icterus.        Right eye: No discharge.   HENT:      Head: Normocephalic and atraumatic.   Neck:      Thyroid: No thyromegaly.      Lymphadenopathy: No cervical adenopathy.   Pulmonary:      Effort: Pulmonary effort is normal. No respiratory distress.      Breath sounds: Normal breath sounds. No wheezing. No rales.   Cardiovascular:      Normal rate. Regular rhythm.      No gallop.    Edema:     Peripheral edema absent.   Abdominal:      Tenderness: There is no abdominal tenderness.   Skin:     Findings: No erythema or rash.   Neurological:      Mental Status: Alert and oriented to person, place, and time.           Assessment:       Diagnosis Plan   1. Coronary artery disease involving native coronary artery of native heart without angina pectoris        2. Mixed hyperlipidemia        3. Benign essential HTN        4. Abnormal EKG                 Plan:       MDM:    1.  Hypertension:    Blood pressure is controlled.  Continue current treatment    2.  Sinus bradycardia:    Patient has sinus bradycardia.  I will decrease Lopressor to 50 mg daily.  Blood pressure and heart rate monitoring is recommended.  I suspect sick sinus syndrome    3.  Hyperlipidemia:    Patient is on Lipitor repeat lipid panel testing    4.  CAD/CABG:    Patient is clinically doing well.  Patient may need stress test in future

## 2023-08-14 ENCOUNTER — OFFICE VISIT (OUTPATIENT)
Dept: CARDIOLOGY | Facility: CLINIC | Age: 88
End: 2023-08-14
Payer: MEDICARE

## 2023-08-14 VITALS
BODY MASS INDEX: 26.11 KG/M2 | WEIGHT: 133 LBS | HEIGHT: 60 IN | OXYGEN SATURATION: 98 % | DIASTOLIC BLOOD PRESSURE: 65 MMHG | SYSTOLIC BLOOD PRESSURE: 140 MMHG | HEART RATE: 58 BPM

## 2023-08-14 DIAGNOSIS — I25.10 CORONARY ARTERY DISEASE INVOLVING NATIVE CORONARY ARTERY OF NATIVE HEART WITHOUT ANGINA PECTORIS: Primary | ICD-10-CM

## 2023-08-14 DIAGNOSIS — R94.31 ABNORMAL EKG: ICD-10-CM

## 2023-08-14 DIAGNOSIS — I10 BENIGN ESSENTIAL HTN: ICD-10-CM

## 2023-08-14 DIAGNOSIS — E78.2 MIXED HYPERLIPIDEMIA: ICD-10-CM

## 2023-08-14 RX ORDER — LISINOPRIL 20 MG/1
20 TABLET ORAL 2 TIMES DAILY
COMMUNITY

## 2023-08-14 RX ORDER — METOPROLOL TARTRATE 50 MG/1
50 TABLET, FILM COATED ORAL DAILY
Status: SHIPPED
Start: 2023-08-14

## 2023-09-22 ENCOUNTER — TELEPHONE (OUTPATIENT)
Dept: CARDIOLOGY | Facility: CLINIC | Age: 88
End: 2023-09-22

## 2023-09-25 ENCOUNTER — OFFICE VISIT (OUTPATIENT)
Dept: CARDIOLOGY | Facility: CLINIC | Age: 88
End: 2023-09-25

## 2023-09-25 VITALS
SYSTOLIC BLOOD PRESSURE: 136 MMHG | BODY MASS INDEX: 25.91 KG/M2 | WEIGHT: 132 LBS | OXYGEN SATURATION: 98 % | HEART RATE: 53 BPM | HEIGHT: 60 IN | DIASTOLIC BLOOD PRESSURE: 80 MMHG

## 2023-09-25 DIAGNOSIS — E78.2 MIXED HYPERLIPIDEMIA: ICD-10-CM

## 2023-09-25 DIAGNOSIS — I10 BENIGN ESSENTIAL HTN: ICD-10-CM

## 2023-09-25 DIAGNOSIS — R00.1 BRADYCARDIA, SINUS: ICD-10-CM

## 2023-09-25 DIAGNOSIS — R00.2 PALPITATIONS: ICD-10-CM

## 2023-09-25 DIAGNOSIS — I25.10 CORONARY ARTERY DISEASE INVOLVING NATIVE CORONARY ARTERY OF NATIVE HEART WITHOUT ANGINA PECTORIS: Primary | ICD-10-CM

## 2023-09-25 PROCEDURE — 99214 OFFICE O/P EST MOD 30 MIN: CPT | Performed by: INTERNAL MEDICINE

## 2023-09-25 PROCEDURE — 1160F RVW MEDS BY RX/DR IN RCRD: CPT | Performed by: INTERNAL MEDICINE

## 2023-09-25 PROCEDURE — 1159F MED LIST DOCD IN RCRD: CPT | Performed by: INTERNAL MEDICINE

## 2023-09-25 NOTE — PROGRESS NOTES
Date of Office Visit: 2023  Encounter Provider: Dr. Deep Aceves  Place of Service: [facility] CARDIOLOGY [place]  Patient Name: Amanda Brown  :6/10/1933  Ranulfo Mims MD    Chief Complaint   Patient presents with    Coronary Artery Disease    Hypertension    Follow-up     History of Present Illness    I am pleased to see Mrs. Brown in my office today as a follow-up.    As you know, patient is a 89-year-old white female whose past medical history significant for hypertension, hyperlipidemia, CAD, CABG, who came today for follow-up.    In 2021, patient had fall due to tripping at local [employer] store.  She broke her left shoulder/humerus.  Patient was recommended to have surgery but she was reluctant and was treated conservatively.  Patient follows with Dr. Aguila.  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    Patient came today for follow-up.  Patient is overall doing well.  Patient denies any symptom of chest pain or tightness or heaviness.  Patient denies any orthopnea or PND.  She had 1 episode of palpitation lasted for 5 to 10 minutes.  No syncope or presyncope.  No leg edema noted.    EKG showed sinus rhythm with right bundle branch block.    Patient had sinus bradycardia and I cut back on metoprolol.  Patient had 1 episode of palpitation but I think it is may be due to some atrial tachyarrhythmia.  However as it is infrequent I would continue to monitor.  Blood pressure at home are all within desirable range.  I would consider Holter monitor in future if there is recurrence of palpitation.  I would consider hydralazine if the blood pressure is consistently high.      Past Medical History:   Diagnosis Date    Coronary artery disease     Fracture, humerus     left    GERD (gastroesophageal reflux disease)     Hyperlipidemia     Hypertension     PONV (postoperative nausea and vomiting)          Past Surgical History:   Procedure Laterality Date    CARDIAC SURGERY   "2009    CABG 4V           Current Outpatient Medications:     aspirin 81 MG EC tablet, Take 1 tablet by mouth Daily. LD 11/29, Disp: , Rfl:     atorvastatin (LIPITOR) 40 MG tablet, Take 1 tablet by mouth Every Night., Disp: , Rfl:     hydroCHLOROthiazide (HYDRODIURIL) 25 MG tablet, , Disp: , Rfl:     latanoprost (XALATAN) 0.005 % ophthalmic solution, latanoprost 0.005 % eye drops, Disp: , Rfl:     lisinopril (PRINIVIL,ZESTRIL) 20 MG tablet, Take 1 tablet by mouth 2 (Two) Times a Day., Disp: , Rfl:     metoprolol tartrate (LOPRESSOR) 50 MG tablet, Take 1 tablet by mouth Daily. Take DOS, Disp: , Rfl:     timolol (TIMOPTIC) 0.5 % ophthalmic solution, timolol maleate 0.5 % eye drops, Disp: , Rfl:       Social History     Socioeconomic History    Marital status:    Tobacco Use    Smoking status: Never    Smokeless tobacco: Never   Vaping Use    Vaping Use: Never used   Substance and Sexual Activity    Alcohol use: Never    Drug use: Never    Sexual activity: Defer         Review of Systems   Constitutional: Negative for chills and fever.   HENT:  Negative for ear discharge and nosebleeds.    Eyes:  Negative for discharge and redness.   Cardiovascular:  Positive for palpitations. Negative for chest pain, orthopnea, paroxysmal nocturnal dyspnea and syncope.   Respiratory:  Negative for cough, shortness of breath and wheezing.    Endocrine: Negative for heat intolerance.   Skin:  Negative for rash.   Musculoskeletal:  Negative for arthritis and myalgias.   Gastrointestinal:  Negative for abdominal pain, melena, nausea and vomiting.   Genitourinary:  Negative for dysuria and hematuria.   Neurological:  Negative for dizziness, light-headedness, numbness and tremors.   Psychiatric/Behavioral:  Negative for depression. The patient is not nervous/anxious.      Procedures    Procedures    No orders to display           Objective:    /80   Pulse 53   Ht 153.4 cm (60.39\")   Wt 59.9 kg (132 lb)   SpO2 98%   BMI " 25.44 kg/m²         Constitutional:       Appearance: Well-developed.   Eyes:      General: No scleral icterus.        Right eye: No discharge.   HENT:      Head: Normocephalic and atraumatic.   Neck:      Thyroid: No thyromegaly.      Lymphadenopathy: No cervical adenopathy.   Pulmonary:      Effort: Pulmonary effort is normal. No respiratory distress.      Breath sounds: Normal breath sounds. No wheezing. No rales.   Cardiovascular:      Normal rate. Regular rhythm.      No gallop.    Edema:     Peripheral edema absent.   Abdominal:      Tenderness: There is no abdominal tenderness.   Skin:     Findings: No erythema or rash.   Neurological:      Mental Status: Alert and oriented to person, place, and time.           Assessment:       Diagnosis Plan   1. Coronary artery disease involving native coronary artery of native heart without angina pectoris        2. Mixed hyperlipidemia        3. Benign essential HTN        4. Palpitations        5. Bradycardia, sinus                 Plan:       MDM:    1.  Sinus bradycardia:    Patient has sinus bradycardia but currently patient is stable and asymptomatic.  I would recommend observation    2.  Palpitation:    Patient had 1 episode of palpitation but otherwise stable.  I will continue to observe.  Recommend observation.  If there is recurrence of palpitation I would consider event monitor.    3.  Hypertension:    Blood pressure is stable at home.  Continue to monitor the blood pressure    4.  CAD/CABG:    Patient is stable

## 2023-10-11 ENCOUNTER — OFFICE VISIT (OUTPATIENT)
Dept: CARDIOLOGY | Facility: CLINIC | Age: 88
End: 2023-10-11
Payer: MEDICARE

## 2023-10-11 VITALS
HEIGHT: 60 IN | SYSTOLIC BLOOD PRESSURE: 165 MMHG | WEIGHT: 135.6 LBS | BODY MASS INDEX: 26.62 KG/M2 | DIASTOLIC BLOOD PRESSURE: 61 MMHG | HEART RATE: 54 BPM

## 2023-10-11 DIAGNOSIS — I10 BENIGN ESSENTIAL HTN: ICD-10-CM

## 2023-10-11 DIAGNOSIS — I25.10 CORONARY ARTERY DISEASE INVOLVING NATIVE CORONARY ARTERY OF NATIVE HEART WITHOUT ANGINA PECTORIS: ICD-10-CM

## 2023-10-11 DIAGNOSIS — I47.10 SUSTAINED SVT: Primary | ICD-10-CM

## 2023-10-11 NOTE — LETTER
2023     Deep Aceves MD  [facility] [place] [address]    Patient: Amanda Brown   YOB: 1933   Date of Visit: 10/11/2023     Dear Deep Aceves MD:       Thank you for referring Amanda Brown to me for evaluation. Below are the relevant portions of my assessment and plan of care.    If you have questions, please do not hesitate to call me. I look forward to following Amanda along with you.         Sincerely,        Shahrzad Dennis MD        CC: No Recipients    Shahrzad Dennis MD  10/11/23 0937  Incomplete  HP      Name: Amanda Brown ADMIT: (Not on file)   : 6/10/1933  PCP: Ranulfo Mims MD    MRN: [id number] LOS: 0 days   AGE/SEX: 89 y.o. female  ROOM: Room/bed info not found     Chief Complaint   Patient presents with    Consult     NP-grover Aceves       Subjective       History of present illness  Amanda Brown is a 89-year-old female patient who has history of coronary artery disease status post four-vessel bypass in , is here today for evaluation of supraventricular tachycardia.  Patient developed palpitations and she was taken to the ER in [place] and she was found to be in SVT at a rate of 155 bpm.  She was given adenosine which terminated the arrhythmia.  Previously she had been on metoprolol 50 mg once a day and it was increased to twice a day.  Patient has not had any more episodes since the event.    Past Medical History:   Diagnosis Date    Coronary artery disease     Fracture, humerus     left    GERD (gastroesophageal reflux disease)     Hyperlipidemia     Hypertension     PONV (postoperative nausea and vomiting)      Past Surgical History:   Procedure Laterality Date    CARDIAC SURGERY      CABG 4V     History reviewed. No pertinent family history.  Social History     Tobacco Use    Smoking status: Never    Smokeless tobacco: Never   Vaping Use    Vaping Use: Never used   Substance Use Topics    Alcohol use: Never  [facility]       Chart reviewed, no opportunity found: CHART REVIEWED, NO OPPORTUNITY FOUND        Patients Insurance        Commercial Insurance: [address] [place] [address]    Drug use: Never     (Not in a hospital admission)    Allergies:  Patient has no known allergies.    Review of systems    Constitutional: Negative.    Respiratory and cardiovascular: As detailed in HPI section.  Gastrointestinal: Negative for constipation, nausea and vomiting negative for abdominal distention, abdominal pain and diarrhea.   Genitourinary: Negative for difficulty urinating and flank pain.   Musculoskeletal: Negative for arthralgias, joint swelling and myalgias.   Skin: Negative for color change, rash and wound.   Neurological: Negative for dizziness, syncope, weakness and headaches.   Hematological: Negative for adenopathy.   Psychiatric/Behavioral: Negative for confusion.   All other systems reviewed and are negative.    Physical Exam  VITALS REVIEWED    General:      well developed, in no acute distress.    Head:      normocephalic and atraumatic.    Eyes:      PERRL/EOM intact, conjunctiva and sclera clear with out nystagmus.    Neck:      no masses, thyromegaly,  trachea central with normal respiratory effort and PMI displaced laterally  Lungs:      Clear to auscultation bilaterally  Heart:       Regular rate and rhythm  Msk:      no deformity or scoliosis noted of thoracic or lumbar spine.    Pulses:      pulses normal in all 4 extremities.    Extremities:       No lower extremity edema  Neurologic:      no focal deficits.   alert oriented x3  Skin:      intact without lesions or rashes.    Psych:      alert and cooperative; normal mood and affect; normal attention span and concentration.      Result Review:               Pertinent cardiac workup    E[facility] from [facility] on 9/28/2023 SVT at a rate of 155 bpm with right bundle branch block.      Procedures        Assessment and Plan      Amanda Brown is a 89-year-old female patient who is here today for evaluation of supraventricular tachycardia.  She does have history of coronary artery disease status post bypass.  On 9/28/2023 she was sent  to the ER for palpitations and she was found to be in SVT, records from ER indicate that she was given IV adenosine which terminated the arrhythmia.  Since then she has been on metoprolol 50 mg twice a day, increased from once a day.  I did offer EP study with ablation since SVTs are unpredictable and can happen despite being on beta-blockers, at this time however the patient would like to hold off on any procedures.  I advised her to call me back if she has any recurrence and we can certainly schedule her for ablation.  Also I instructed her on some of the maneuvers she can perform at home to break the arrhythmia before going to the ER.  Patient and family voiced understanding.  She will continue to follow-up with Dr. Aceves and call me back if she decides to have an ablation.    Diagnoses and all orders for this visit:    1. Sustained SVT (Primary)    2. Benign essential HTN    3. Coronary artery disease involving native coronary artery of native heart without angina pectoris           No follow-ups on file.  Patient was given instructions and counseling regarding her condition or for health maintenance advice. Please see specific information pulled into the AVS if appropriate.

## 2023-10-11 NOTE — PROGRESS NOTES
HP      Name: Amanda Brown ADMIT: (Not on file)   : 6/10/1933  PCP: Ranulfo Mims MD    MRN: [id number] LOS: 0 days   AGE/SEX: 89 y.o. female  ROOM: Room/bed info not found     Chief Complaint   Patient presents with    Consult     NP-grover Aceves       Subjective        History of present illness  Amanda Brown is a 89-year-old female patient who has history of coronary artery disease status post four-vessel bypass in , is here today for evaluation of supraventricular tachycardia.  Patient developed palpitations and she was taken to the ER in [place] and she was found to be in SVT at a rate of 155 bpm.  She was given adenosine which terminated the arrhythmia.  Previously she had been on metoprolol 50 mg once a day and it was increased to twice a day.  Patient has not had any more episodes since the event.    Past Medical History:   Diagnosis Date    Coronary artery disease     Fracture, humerus     left    GERD (gastroesophageal reflux disease)     Hyperlipidemia     Hypertension     PONV (postoperative nausea and vomiting)      Past Surgical History:   Procedure Laterality Date    CARDIAC SURGERY      CABG 4V     History reviewed. No pertinent family history.  Social History     Tobacco Use    Smoking status: Never    Smokeless tobacco: Never   Vaping Use    Vaping Use: Never used   Substance Use Topics    Alcohol use: Never    Drug use: Never     (Not in a hospital admission)    Allergies:  Patient has no known allergies.    Review of systems    Constitutional: Negative.    Respiratory and cardiovascular: As detailed in HPI section.  Gastrointestinal: Negative for constipation, nausea and vomiting negative for abdominal distention, abdominal pain and diarrhea.   Genitourinary: Negative for difficulty urinating and flank pain.   Musculoskeletal: Negative for arthralgias, joint swelling and myalgias.   Skin: Negative for color change, rash and wound.   Neurological: Negative for dizziness,  syncope, weakness and headaches.   Hematological: Negative for adenopathy.   Psychiatric/Behavioral: Negative for confusion.   All other systems reviewed and are negative.    Physical Exam  VITALS REVIEWED    General:      well developed, in no acute distress.    Head:      normocephalic and atraumatic.    Eyes:      PERRL/EOM intact, conjunctiva and sclera clear with out nystagmus.    Neck:      no masses, thyromegaly,  trachea central with normal respiratory effort and PMI displaced laterally  Lungs:      Clear to auscultation bilaterally  Heart:       Regular rate and rhythm  Msk:      no deformity or scoliosis noted of thoracic or lumbar spine.    Pulses:      pulses normal in all 4 extremities.    Extremities:       No lower extremity edema  Neurologic:      no focal deficits.   alert oriented x3  Skin:      intact without lesions or rashes.    Psych:      alert and cooperative; normal mood and affect; normal attention span and concentration.      Result Review :               Pertinent cardiac workup    EKG from [place] ER on 9/28/2023 SVT at a rate of 155 bpm with right bundle branch block.      Procedures        Assessment and Plan      Amanda Brown is a 89-year-old female patient who is here today for evaluation of supraventricular tachycardia.  She does have history of coronary artery disease status post bypass.  On 9/28/2023 she was sent to the ER for palpitations and she was found to be in SVT, records from ER indicate that she was given IV adenosine which terminated the arrhythmia.  Since then she has been on metoprolol 50 mg twice a day, increased from once a day.  I did offer EP study with ablation since SVTs are unpredictable and can happen despite being on beta-blockers, at this time however the patient would like to hold off on any procedures.  I advised her to call me back if she has any recurrence and we can certainly schedule her for ablation.  Also I instructed her on some of the maneuvers  she can perform at home to break the arrhythmia before going to the ER.  Patient and family voiced understanding.  She will continue to follow-up with Dr. Aceves and call me back if she decides to have an ablation.    Diagnoses and all orders for this visit:    1. Sustained SVT (Primary)    2. Benign essential HTN    3. Coronary artery disease involving native coronary artery of native heart without angina pectoris           No follow-ups on file.  Patient was given instructions and counseling regarding her condition or for health maintenance advice. Please see specific information pulled into the AVS if appropriate.

## 2024-01-07 ENCOUNTER — APPOINTMENT (OUTPATIENT)
Dept: CARDIOLOGY | Facility: HOSPITAL | Age: 89
End: 2024-01-07
Payer: MEDICARE

## 2024-01-07 ENCOUNTER — HOSPITAL ENCOUNTER (OUTPATIENT)
Facility: HOSPITAL | Age: 89
Setting detail: OBSERVATION
Discharge: HOME | End: 2024-01-08
Attending: STUDENT IN AN ORGANIZED HEALTH CARE EDUCATION/TRAINING PROGRAM | Admitting: STUDENT IN AN ORGANIZED HEALTH CARE EDUCATION/TRAINING PROGRAM
Payer: MEDICARE

## 2024-01-07 ENCOUNTER — APPOINTMENT (OUTPATIENT)
Dept: GENERAL RADIOLOGY | Facility: HOSPITAL | Age: 89
End: 2024-01-07
Payer: MEDICARE

## 2024-01-07 PROBLEM — J18.9 PNEUMONIA DUE TO INFECTIOUS ORGANISM: Status: ACTIVE | Noted: 2024-01-07

## 2024-01-07 PROBLEM — I47.10 SVT (SUPRAVENTRICULAR TACHYCARDIA): Status: ACTIVE | Noted: 2024-01-07

## 2024-01-07 LAB
ALBUMIN SERPL-MCNC: 3.2 G/DL (ref 3.5–5.2)
ALBUMIN/GLOB SERPL: 1.2 G/DL
ALP SERPL-CCNC: 106 U/L (ref 39–117)
ALT SERPL W P-5'-P-CCNC: 29 U/L (ref 1–33)
ANION GAP SERPL CALCULATED.3IONS-SCNC: 9 MMOL/L (ref 5–15)
AST SERPL-CCNC: 37 U/L (ref 1–32)
BASOPHILS # BLD AUTO: 0 10*3/MM3 (ref 0–0.2)
BASOPHILS NFR BLD AUTO: 0.4 % (ref 0–1.5)
BH CV ECHO LEFT VENTRICLE GLOBAL LONGITUDINAL STRAIN: -16.7 %
BH CV ECHO MEAS - ACS: 1.4 CM
BH CV ECHO MEAS - AO MAX PG: 7.4 MMHG
BH CV ECHO MEAS - AO MEAN PG: 4 MMHG
BH CV ECHO MEAS - AO V2 MAX: 136 CM/SEC
BH CV ECHO MEAS - AO V2 VTI: 31.5 CM
BH CV ECHO MEAS - AVA(I,D): 2.12 CM2
BH CV ECHO MEAS - EDV(CUBED): 50.7 ML
BH CV ECHO MEAS - EDV(MOD-SP4): 55.5 ML
BH CV ECHO MEAS - EF(MOD-BP): 57 %
BH CV ECHO MEAS - EF(MOD-SP4): 57.3 %
BH CV ECHO MEAS - ESV(CUBED): 17.6 ML
BH CV ECHO MEAS - ESV(MOD-SP4): 23.7 ML
BH CV ECHO MEAS - FS: 29.7 %
BH CV ECHO MEAS - IVS/LVPW: 1 CM
BH CV ECHO MEAS - IVSD: 1 CM
BH CV ECHO MEAS - LA DIMENSION: 4.1 CM
BH CV ECHO MEAS - LAT PEAK E' VEL: 11.5 CM/SEC
BH CV ECHO MEAS - LV DIASTOLIC VOL/BSA (35-75): 35.5 CM2
BH CV ECHO MEAS - LV MASS(C)D: 112.5 GRAMS
BH CV ECHO MEAS - LV MAX PG: 2.7 MMHG
BH CV ECHO MEAS - LV MEAN PG: 1 MMHG
BH CV ECHO MEAS - LV SYSTOLIC VOL/BSA (12-30): 15.1 CM2
BH CV ECHO MEAS - LV V1 MAX: 82.8 CM/SEC
BH CV ECHO MEAS - LV V1 VTI: 23.6 CM
BH CV ECHO MEAS - LVIDD: 3.7 CM
BH CV ECHO MEAS - LVIDS: 2.6 CM
BH CV ECHO MEAS - LVOT AREA: 2.8 CM2
BH CV ECHO MEAS - LVOT DIAM: 1.9 CM
BH CV ECHO MEAS - LVPWD: 1 CM
BH CV ECHO MEAS - MED PEAK E' VEL: 6.1 CM/SEC
BH CV ECHO MEAS - MR MAX PG: 59.6 MMHG
BH CV ECHO MEAS - MR MAX VEL: 386 CM/SEC
BH CV ECHO MEAS - MV A MAX VEL: 43.5 CM/SEC
BH CV ECHO MEAS - MV DEC SLOPE: 370 CM/SEC2
BH CV ECHO MEAS - MV DEC TIME: 0.19 SEC
BH CV ECHO MEAS - MV E MAX VEL: 101 CM/SEC
BH CV ECHO MEAS - MV E/A: 2.32
BH CV ECHO MEAS - MV MAX PG: 5.1 MMHG
BH CV ECHO MEAS - MV MEAN PG: 1 MMHG
BH CV ECHO MEAS - MV P1/2T: 87.1 MSEC
BH CV ECHO MEAS - MV V2 VTI: 31 CM
BH CV ECHO MEAS - MVA(P1/2T): 2.5 CM2
BH CV ECHO MEAS - MVA(VTI): 2.16 CM2
BH CV ECHO MEAS - PA ACC TIME: 0.13 SEC
BH CV ECHO MEAS - PA V2 MAX: 100 CM/SEC
BH CV ECHO MEAS - PI END-D VEL: 95.3 CM/SEC
BH CV ECHO MEAS - PULM A REVS DUR: 0.12 SEC
BH CV ECHO MEAS - PULM A REVS VEL: 19.9 CM/SEC
BH CV ECHO MEAS - PULM DIAS VEL: 49.6 CM/SEC
BH CV ECHO MEAS - PULM S/D: 0.77
BH CV ECHO MEAS - PULM SYS VEL: 38.2 CM/SEC
BH CV ECHO MEAS - RAP SYSTOLE: 3 MMHG
BH CV ECHO MEAS - RV MAX PG: 3.5 MMHG
BH CV ECHO MEAS - RV V1 MAX: 94.2 CM/SEC
BH CV ECHO MEAS - RV V1 VTI: 26 CM
BH CV ECHO MEAS - RVDD: 3.1 CM
BH CV ECHO MEAS - RVSP: 35.7 MMHG
BH CV ECHO MEAS - SI(MOD-SP4): 20.3 ML/M2
BH CV ECHO MEAS - SV(LVOT): 66.9 ML
BH CV ECHO MEAS - SV(MOD-SP4): 31.8 ML
BH CV ECHO MEAS - TAPSE (>1.6): 1.13 CM
BH CV ECHO MEAS - TR MAX PG: 32.7 MMHG
BH CV ECHO MEAS - TR MAX VEL: 286 CM/SEC
BH CV ECHO MEASUREMENTS AVERAGE E/E' RATIO: 11.48
BH CV XLRA - TDI S': 7.6 CM/SEC
BILIRUB SERPL-MCNC: 0.3 MG/DL (ref 0–1.2)
BUN SERPL-MCNC: 21 MG/DL (ref 8–23)
BUN/CREAT SERPL: 22.3 (ref 7–25)
CALCIUM SPEC-SCNC: 8.5 MG/DL (ref 8.2–9.6)
CHLORIDE SERPL-SCNC: 111 MMOL/L (ref 98–107)
CO2 SERPL-SCNC: 26 MMOL/L (ref 22–29)
CREAT SERPL-MCNC: 0.94 MG/DL (ref 0.57–1)
DEPRECATED RDW RBC AUTO: 41.6 FL (ref 37–54)
EGFRCR SERPLBLD CKD-EPI 2021: 57.8 ML/MIN/1.73
EOSINOPHIL # BLD AUTO: 0.3 10*3/MM3 (ref 0–0.4)
EOSINOPHIL NFR BLD AUTO: 4.9 % (ref 0.3–6.2)
ERYTHROCYTE [DISTWIDTH] IN BLOOD BY AUTOMATED COUNT: 12.5 % (ref 12.3–15.4)
GLOBULIN UR ELPH-MCNC: 2.7 GM/DL
GLUCOSE SERPL-MCNC: 98 MG/DL (ref 65–99)
HCT VFR BLD AUTO: 31.8 % (ref 34–46.6)
HGB BLD-MCNC: 10.6 G/DL (ref 12–15.9)
LEFT ATRIUM VOLUME INDEX: 28.5 ML/M2
LYMPHOCYTES # BLD AUTO: 2 10*3/MM3 (ref 0.7–3.1)
LYMPHOCYTES NFR BLD AUTO: 30.1 % (ref 19.6–45.3)
MCH RBC QN AUTO: 30.2 PG (ref 26.6–33)
MCHC RBC AUTO-ENTMCNC: 33.2 G/DL (ref 31.5–35.7)
MCV RBC AUTO: 90.9 FL (ref 79–97)
MONOCYTES # BLD AUTO: 0.4 10*3/MM3 (ref 0.1–0.9)
MONOCYTES NFR BLD AUTO: 6.1 % (ref 5–12)
NEUTROPHILS NFR BLD AUTO: 3.8 10*3/MM3 (ref 1.7–7)
NEUTROPHILS NFR BLD AUTO: 58.5 % (ref 42.7–76)
NRBC BLD AUTO-RTO: 0.2 /100 WBC (ref 0–0.2)
PLATELET # BLD AUTO: 204 10*3/MM3 (ref 140–450)
PMV BLD AUTO: 9.2 FL (ref 6–12)
POTASSIUM SERPL-SCNC: 3.9 MMOL/L (ref 3.5–5.2)
PROT SERPL-MCNC: 5.9 G/DL (ref 6–8.5)
QT INTERVAL: 495 MS
QTC INTERVAL: 498 MS
RBC # BLD AUTO: 3.5 10*6/MM3 (ref 3.77–5.28)
SINUS: 2.8 CM
SODIUM SERPL-SCNC: 146 MMOL/L (ref 136–145)
WBC NRBC COR # BLD AUTO: 6.6 10*3/MM3 (ref 3.4–10.8)

## 2024-01-07 PROCEDURE — 25010000002 ENOXAPARIN PER 10 MG: Performed by: STUDENT IN AN ORGANIZED HEALTH CARE EDUCATION/TRAINING PROGRAM

## 2024-01-07 PROCEDURE — 71045 X-RAY EXAM CHEST 1 VIEW: CPT

## 2024-01-07 PROCEDURE — 93010 ELECTROCARDIOGRAM REPORT: CPT | Performed by: INTERNAL MEDICINE

## 2024-01-07 PROCEDURE — 93306 TTE W/DOPPLER COMPLETE: CPT | Performed by: INTERNAL MEDICINE

## 2024-01-07 PROCEDURE — G0378 HOSPITAL OBSERVATION PER HR: HCPCS

## 2024-01-07 PROCEDURE — 93306 TTE W/DOPPLER COMPLETE: CPT

## 2024-01-07 PROCEDURE — 99215 OFFICE O/P EST HI 40 MIN: CPT | Performed by: INTERNAL MEDICINE

## 2024-01-07 PROCEDURE — 93356 MYOCRD STRAIN IMG SPCKL TRCK: CPT | Performed by: INTERNAL MEDICINE

## 2024-01-07 PROCEDURE — 96372 THER/PROPH/DIAG INJ SC/IM: CPT

## 2024-01-07 PROCEDURE — 85025 COMPLETE CBC W/AUTO DIFF WBC: CPT | Performed by: STUDENT IN AN ORGANIZED HEALTH CARE EDUCATION/TRAINING PROGRAM

## 2024-01-07 PROCEDURE — G0379 DIRECT REFER HOSPITAL OBSERV: HCPCS

## 2024-01-07 PROCEDURE — 93356 MYOCRD STRAIN IMG SPCKL TRCK: CPT

## 2024-01-07 PROCEDURE — 93005 ELECTROCARDIOGRAM TRACING: CPT | Performed by: INTERNAL MEDICINE

## 2024-01-07 PROCEDURE — 80053 COMPREHEN METABOLIC PANEL: CPT | Performed by: STUDENT IN AN ORGANIZED HEALTH CARE EDUCATION/TRAINING PROGRAM

## 2024-01-07 RX ORDER — HYDRALAZINE HYDROCHLORIDE 20 MG/ML
10 INJECTION INTRAMUSCULAR; INTRAVENOUS EVERY 4 HOURS PRN
Status: DISCONTINUED | OUTPATIENT
Start: 2024-01-07 | End: 2024-01-08 | Stop reason: HOSPADM

## 2024-01-07 RX ORDER — ONDANSETRON 4 MG/1
4 TABLET, ORALLY DISINTEGRATING ORAL EVERY 6 HOURS PRN
Status: DISCONTINUED | OUTPATIENT
Start: 2024-01-07 | End: 2024-01-08 | Stop reason: HOSPADM

## 2024-01-07 RX ORDER — ASPIRIN 81 MG/1
81 TABLET ORAL DAILY
Status: DISCONTINUED | OUTPATIENT
Start: 2024-01-07 | End: 2024-01-08 | Stop reason: HOSPADM

## 2024-01-07 RX ORDER — ALUMINA, MAGNESIA, AND SIMETHICONE 2400; 2400; 240 MG/30ML; MG/30ML; MG/30ML
15 SUSPENSION ORAL EVERY 6 HOURS PRN
Status: DISCONTINUED | OUTPATIENT
Start: 2024-01-07 | End: 2024-01-08 | Stop reason: HOSPADM

## 2024-01-07 RX ORDER — HYDROCHLOROTHIAZIDE 25 MG/1
25 TABLET ORAL DAILY
Status: DISCONTINUED | OUTPATIENT
Start: 2024-01-07 | End: 2024-01-08 | Stop reason: HOSPADM

## 2024-01-07 RX ORDER — SODIUM CHLORIDE 0.9 % (FLUSH) 0.9 %
10 SYRINGE (ML) INJECTION AS NEEDED
Status: DISCONTINUED | OUTPATIENT
Start: 2024-01-07 | End: 2024-01-08 | Stop reason: HOSPADM

## 2024-01-07 RX ORDER — SODIUM CHLORIDE 9 MG/ML
40 INJECTION, SOLUTION INTRAVENOUS AS NEEDED
Status: DISCONTINUED | OUTPATIENT
Start: 2024-01-07 | End: 2024-01-08 | Stop reason: HOSPADM

## 2024-01-07 RX ORDER — TIMOLOL MALEATE 5 MG/ML
1 SOLUTION/ DROPS OPHTHALMIC EVERY 12 HOURS SCHEDULED
Status: DISCONTINUED | OUTPATIENT
Start: 2024-01-07 | End: 2024-01-08 | Stop reason: HOSPADM

## 2024-01-07 RX ORDER — SODIUM CHLORIDE 0.9 % (FLUSH) 0.9 %
10 SYRINGE (ML) INJECTION EVERY 12 HOURS SCHEDULED
Status: DISCONTINUED | OUTPATIENT
Start: 2024-01-07 | End: 2024-01-08 | Stop reason: HOSPADM

## 2024-01-07 RX ORDER — POLYETHYLENE GLYCOL 3350 17 G/17G
17 POWDER, FOR SOLUTION ORAL DAILY PRN
Status: DISCONTINUED | OUTPATIENT
Start: 2024-01-07 | End: 2024-01-08 | Stop reason: HOSPADM

## 2024-01-07 RX ORDER — AMOXICILLIN 250 MG
2 CAPSULE ORAL 2 TIMES DAILY
Status: DISCONTINUED | OUTPATIENT
Start: 2024-01-07 | End: 2024-01-08 | Stop reason: HOSPADM

## 2024-01-07 RX ORDER — BISACODYL 10 MG
10 SUPPOSITORY, RECTAL RECTAL DAILY PRN
Status: DISCONTINUED | OUTPATIENT
Start: 2024-01-07 | End: 2024-01-08 | Stop reason: HOSPADM

## 2024-01-07 RX ORDER — OXYCODONE HYDROCHLORIDE 5 MG/1
5 TABLET ORAL EVERY 4 HOURS PRN
Status: DISCONTINUED | OUTPATIENT
Start: 2024-01-07 | End: 2024-01-08 | Stop reason: HOSPADM

## 2024-01-07 RX ORDER — ACETAMINOPHEN 325 MG/1
650 TABLET ORAL EVERY 4 HOURS PRN
Status: DISCONTINUED | OUTPATIENT
Start: 2024-01-07 | End: 2024-01-08 | Stop reason: HOSPADM

## 2024-01-07 RX ORDER — LATANOPROST 50 UG/ML
1 SOLUTION/ DROPS OPHTHALMIC NIGHTLY
Status: DISCONTINUED | OUTPATIENT
Start: 2024-01-07 | End: 2024-01-08 | Stop reason: HOSPADM

## 2024-01-07 RX ORDER — ATORVASTATIN CALCIUM 40 MG/1
40 TABLET, FILM COATED ORAL NIGHTLY
Status: DISCONTINUED | OUTPATIENT
Start: 2024-01-07 | End: 2024-01-08 | Stop reason: HOSPADM

## 2024-01-07 RX ORDER — CEFDINIR 300 MG/1
300 CAPSULE ORAL EVERY 12 HOURS SCHEDULED
Qty: 10 CAPSULE | Refills: 0 | Status: DISCONTINUED | OUTPATIENT
Start: 2024-01-07 | End: 2024-01-08 | Stop reason: HOSPADM

## 2024-01-07 RX ORDER — CEFDINIR 300 MG/1
300 CAPSULE ORAL ONCE
Status: DISCONTINUED | OUTPATIENT
Start: 2024-01-07 | End: 2024-01-08

## 2024-01-07 RX ORDER — ENOXAPARIN SODIUM 100 MG/ML
40 INJECTION SUBCUTANEOUS EVERY 24 HOURS
Status: DISCONTINUED | OUTPATIENT
Start: 2024-01-07 | End: 2024-01-08 | Stop reason: HOSPADM

## 2024-01-07 RX ORDER — LISINOPRIL 20 MG/1
20 TABLET ORAL 2 TIMES DAILY
Status: DISCONTINUED | OUTPATIENT
Start: 2024-01-07 | End: 2024-01-08 | Stop reason: HOSPADM

## 2024-01-07 RX ORDER — METOPROLOL TARTRATE 50 MG/1
50 TABLET, FILM COATED ORAL DAILY
Status: DISCONTINUED | OUTPATIENT
Start: 2024-01-07 | End: 2024-01-08

## 2024-01-07 RX ORDER — ONDANSETRON 2 MG/ML
4 INJECTION INTRAMUSCULAR; INTRAVENOUS EVERY 6 HOURS PRN
Status: DISCONTINUED | OUTPATIENT
Start: 2024-01-07 | End: 2024-01-08 | Stop reason: HOSPADM

## 2024-01-07 RX ORDER — OLANZAPINE 5 MG/1
5 TABLET, ORALLY DISINTEGRATING ORAL NIGHTLY PRN
Status: DISCONTINUED | OUTPATIENT
Start: 2024-01-07 | End: 2024-01-08 | Stop reason: HOSPADM

## 2024-01-07 RX ORDER — BISACODYL 5 MG/1
5 TABLET, DELAYED RELEASE ORAL DAILY PRN
Status: DISCONTINUED | OUTPATIENT
Start: 2024-01-07 | End: 2024-01-08 | Stop reason: HOSPADM

## 2024-01-07 RX ADMIN — Medication 10 ML: at 08:34

## 2024-01-07 RX ADMIN — ENOXAPARIN SODIUM 40 MG: 100 INJECTION SUBCUTANEOUS at 16:47

## 2024-01-07 RX ADMIN — LISINOPRIL 20 MG: 20 TABLET ORAL at 08:33

## 2024-01-07 RX ADMIN — METOPROLOL TARTRATE 50 MG: 50 TABLET ORAL at 08:33

## 2024-01-07 RX ADMIN — ATORVASTATIN CALCIUM 40 MG: 40 TABLET, FILM COATED ORAL at 20:38

## 2024-01-07 RX ADMIN — LISINOPRIL 20 MG: 20 TABLET ORAL at 20:38

## 2024-01-07 RX ADMIN — HYDROCHLOROTHIAZIDE 25 MG: 25 TABLET ORAL at 08:33

## 2024-01-07 RX ADMIN — Medication 10 ML: at 20:38

## 2024-01-07 RX ADMIN — DOCUSATE SODIUM AND SENNOSIDES 2 TABLET: 8.6; 5 TABLET, FILM COATED ORAL at 08:33

## 2024-01-07 RX ADMIN — DOCUSATE SODIUM AND SENNOSIDES 2 TABLET: 8.6; 5 TABLET, FILM COATED ORAL at 20:39

## 2024-01-07 RX ADMIN — ASPIRIN 81 MG: 81 TABLET, COATED ORAL at 08:33

## 2024-01-07 RX ADMIN — CEFDINIR 300 MG: 300 CAPSULE ORAL at 20:38

## 2024-01-07 RX ADMIN — TIMOLOL MALEATE 1 DROP: 5 SOLUTION OPHTHALMIC at 08:33

## 2024-01-07 NOTE — H&P
"[facility] Medicine Services  History & Physical    Patient Name: Amanda Brown  : 6/10/1933  MRN: [id number]  Primary Care Physician:  Ranulfo Mims MD  Date of admission: 2024  Date and Time of Service: 2024 at 0220    Subjective      Chief Complaint: Palpitations    History of Present Illness: Amanda Brown is a 89 y.o. female with a CMH of CAD, HTN, HLD, GERD who presented to [facility] on 2024 with palpitations.    Patient states for the last week she has had a progressively worsening cough.  On Friday, saw a provider and was given azithromycin.  Today, starting around 4pm, patient noticed palpitations, which she described as her \"heart was going to jump out of (her) chest\".  She presented to the [place] ED who found her HR to be in the 200's.  They gave her IV metoprolol, but they had maxed out.  She was then placed on a cardizem drip and her heart rate did manage to climb down to the 60's to 70's.  Dr. Aceves is her cardiologist, so they contacted him and he agreed to have the patient admitted and to see her in the AM.      Review of Systems   Constitutional:  Negative for chills and fever.   HENT:  Negative for congestion and rhinorrhea.    Eyes:  Negative for visual disturbance.   Respiratory:  Positive for cough. Negative for shortness of breath.    Cardiovascular:  Positive for palpitations. Negative for chest pain.   Gastrointestinal:  Negative for abdominal pain, diarrhea, nausea and vomiting.   Endocrine: Negative for polyuria.   Genitourinary:  Negative for difficulty urinating and dyspareunia.   Musculoskeletal:  Negative for back pain and myalgias.   Skin:  Negative for rash and wound.   Neurological:  Negative for weakness, numbness and headaches.   Psychiatric/Behavioral:  Negative for agitation, behavioral problems and confusion.        Personal History     Past Medical History:   Diagnosis Date    Coronary artery disease     Fracture, humerus " 2021    left    GERD (gastroesophageal reflux disease)     Hyperlipidemia     Hypertension     PONV (postoperative nausea and vomiting)        Past Surgical History:   Procedure Laterality Date    CARDIAC SURGERY  2009    CABG 4V       Family History: family history is not on file. Otherwise pertinent FHx was reviewed and not pertinent to current issue.    Social History:  reports that she has never smoked. She has never used smokeless tobacco. She reports that she does not drink alcohol and does not use drugs.    Home Medications:  Prior to Admission Medications       Prescriptions Last Dose Informant Patient Reported? Taking?    aspirin 81 MG EC tablet 1/6/2024  Yes Yes    Take 1 tablet by mouth Daily. LD 11/29    atorvastatin (LIPITOR) 40 MG tablet 1/6/2024  Yes Yes    Take 1 tablet by mouth 2 (Two) Times a Day.    hydroCHLOROthiazide (HYDRODIURIL) 25 MG tablet 1/6/2024  Yes Yes    latanoprost (XALATAN) 0.005 % ophthalmic solution 1/6/2024  Yes Yes    latanoprost 0.005 % eye drops    lisinopril (PRINIVIL,ZESTRIL) 20 MG tablet 1/6/2024  Yes Yes    Take 1 tablet by mouth 2 (Two) Times a Day.    metoprolol tartrate (LOPRESSOR) 50 MG tablet 1/6/2024  No Yes    Take 1 tablet by mouth Daily. Take DOS    timolol (TIMOPTIC) 0.5 % ophthalmic solution 1/6/2024  Yes Yes    timolol maleate 0.5 % eye drops              Allergies:  No Known Allergies    Objective      Vitals:   Temp:  [98.1 °F (36.7 °C)] 98.1 °F (36.7 °C)  Heart Rate:  [67] 67  Resp:  [19] 19  BP: (178)/(65) 178/65  Body mass index is 25.62 kg/m².  Physical Exam  Constitutional:       General: She is not in acute distress.  HENT:      Head: Normocephalic and atraumatic.   Cardiovascular:      Rate and Rhythm: Normal rate and regular rhythm.      Heart sounds: Normal heart sounds.   Pulmonary:      Effort: Pulmonary effort is normal. No respiratory distress.      Breath sounds: Normal breath sounds.   Abdominal:      Palpations: Abdomen is soft.       Tenderness: There is no abdominal tenderness.   Musculoskeletal:      Right lower leg: No edema.      Left lower leg: No edema.   Skin:     General: Skin is warm and dry.   Neurological:      Mental Status: She is alert.   Psychiatric:         Mood and Affect: Mood normal.         Behavior: Behavior normal.         Diagnostic Data:  Lab Results (last 24 hours)       ** No results found for the last 24 hours. **             Imaging Results (Last 24 Hours)       ** No results found for the last 24 hours. **              Assessment & Plan        This is a 89 y.o. female with:    Active and Resolved Problems  Active Hospital Problems    Diagnosis  POA    **SVT (supraventricular tachycardia) [I47.10]  Yes    Pneumonia due to infectious organism [J18.9]  Yes    Benign essential HTN [I10]  Yes    Mixed hyperlipidemia [E78.2]  Yes    Coronary arteriosclerosis [I25.10]  Yes    Osteoarthritis [M19.90]  Yes      Resolved Hospital Problems   No resolved problems to display.       #SVT  Given that she had just started azithromycin and it is known to affect cardiac rhythms, this is the only thing I can think of that would cause a sudden issue.  She is now out of tachycardia without cardizem, which could mean the medication is now out of her system.  - D/C Azithromycin  - Cardene ggt if needed as she is likely maxed out on IV metoprolol  - Continue home meds  - Cardiology consulted, appreciate recs    #HTN  Hypertensive right now.  HR currently in the 60's likely due to all the metoprolol she had.  - Hydralazine IV PRN  - Continue home meds    #Pneumonia  Patient was started on azithromycin for PNA.  Will substitute with another medication.  - Omnicef for 5 days    #HLD  #CAD  #OA  Stable.  - Continue home medications    DVT prophylaxis:  Medical DVT prophylaxis orders are present.    The patient desires to be as follows:    CODE STATUS:    Level Of Support Discussed With: Patient  Code Status (Patient has no pulse and is not  breathing): CPR (Attempt to Resuscitate)  Medical Interventions (Patient has pulse or is breathing): Full Support        Lizabeth De Los Santos, who can be contacted at [phone], is the designated person to make medical decisions on the patient's behalf if She is incapable of doing so. This was clarified with patient and/or next of kin on 1/7/2024 during the course of this H&P.    Admission Status:  I believe this patient meets observation status.    Expected Length of Stay: 1-2 nights    PDMP and Medication Dispenses via Sidebar reviewed and consistent with patient reported medications.    I discussed the patient's findings and my recommendations with patient and family.      Signature:     This document has been electronically signed by Charles Gordon MD on January 7, 2024 02:37 [facility] Hospitalist Team

## 2024-01-07 NOTE — PLAN OF CARE
Goal Outcome Evaluation:  Plan of Care Reviewed With: patient        Progress: no change  Outcome Evaluation: VSS, admitted from [place] ED w/SVT. Meds given there have kept pt HR controlled in the 50-60's with stable BP. Pt alert/oriented, up ad prudence to [facility]. Admission database completed. Pt sees Dr. Garzon for cardiology and same problem previously and he will be consulted. No c/o pain. Daughter at bedside overnight.

## 2024-01-07 NOTE — CONSULTS
Referring Provider: Deep Lackey MD    Reason for Consultation: Palpitations, SVT, elevated troponin      Patient Care Team:  Ranulfo Mims MD as PCP - General (Internal Medicine)      SUBJECTIVE     Chief Complaint: Palpitations    History of present illness:  Amanda Brown is a 89 y.o. female, patient of Dr. Aceves with hypertension, hyperlipidemia, coronary artery disease status post CABG who initially presented to [place] [facility] with complaints of palpitations and was noted to be in SVT.  Of note she has been complaining of cough and was recently started on antibiotics.  Her heart rate in the ER was noted to be in the 200s.  She was given IV metoprolol and was then placed on Cardizem drip.  Her heart rate came down to 60s and 70s.  Patient's family spoke to Dr. Aceves.  Patient was advised to be transferred to [facility] for further management.      Review of systems:    Constitutional: No weakness, fatigue, fever, rigors, chills   Eyes: No vision changes, eye pain   ENT/oropharynx: No difficulty swallowing, sore throat, epistaxis, changes in hearing   Cardiovascular: No chest pain, chest tightness, + palpitations, paroxysmal nocturnal dyspnea, orthopnea, diaphoresis, dizziness / syncopal episode   Respiratory: No shortness of breath, dyspnea on exertion, cough, wheezing, hemoptysis   Gastrointestinal: No abdominal pain, nausea, vomiting, diarrhea, bloody stools   Genitourinary: No hematuria, dysuria   Neurological: No headache, tremors, numbness, one-sided weakness    Musculoskeletal: No cramps, myalgias, joint pain, joint swelling   Integument: No rash, edema        Personal History:      Past Medical History:   Diagnosis Date    Coronary artery disease     Fracture, humerus 2021    left    GERD (gastroesophageal reflux disease)     Hyperlipidemia     Hypertension     PONV (postoperative nausea and vomiting)        Past Surgical History:   Procedure Laterality Date    CARDIAC SURGERY  2009     CABG 4V       History reviewed. No pertinent family history.    Social History     Tobacco Use    Smoking status: Never    Smokeless tobacco: Never   Vaping Use    Vaping Use: Never used   Substance Use Topics    Alcohol use: Never    Drug use: Never        Home meds:  Prior to Admission medications    Medication Sig Start Date End Date Taking? Authorizing Provider   aspirin 81 MG EC tablet Take 1 tablet by mouth Daily. LD 11/29   Yes Edis Hsu MD   atorvastatin (LIPITOR) 40 MG tablet Take 1 tablet by mouth 2 (Two) Times a Day. 9/28/23  Yes Edis Hsu MD   hydroCHLOROthiazide (HYDRODIURIL) 25 MG tablet  7/14/22  Yes ProviderEdis MD   latanoprost (XALATAN) 0.005 % ophthalmic solution latanoprost 0.005 % eye drops   Yes ProviderEdis MD   lisinopril (PRINIVIL,ZESTRIL) 20 MG tablet Take 1 tablet by mouth 2 (Two) Times a Day.   Yes ProviderEdis MD   metoprolol tartrate (LOPRESSOR) 50 MG tablet Take 1 tablet by mouth Daily. Take DOS 8/14/23  Yes Deep Aceves MD   timolol (TIMOPTIC) 0.5 % ophthalmic solution timolol maleate 0.5 % eye drops   Yes Provider, MD Edis       Allergies:     Patient has no known allergies.    Scheduled Meds:aspirin, 81 mg, Oral, Daily  atorvastatin, 40 mg, Oral, BID  cefdinir, 300 mg, Oral, Q12H  cefdinir, 300 mg, Oral, Once  enoxaparin, 40 mg, Subcutaneous, Q24H  hydroCHLOROthiazide, 25 mg, Oral, Daily  latanoprost, 1 drop, Both Eyes, Nightly  lisinopril, 20 mg, Oral, BID  metoprolol tartrate, 50 mg, Oral, Daily  senna-docusate sodium, 2 tablet, Oral, BID  sodium chloride, 10 mL, Intravenous, Q12H  timolol, 1 drop, Both Eyes, Q12H      Continuous Infusions:   PRN Meds:  acetaminophen    aluminum-magnesium hydroxide-simethicone    senna-docusate sodium **AND** polyethylene glycol **AND** bisacodyl **AND** bisacodyl    Calcium Replacement - Follow Nurse / BPA Driven Protocol    hydrALAZINE    Magnesium Standard Dose Replacement - Follow  "Nurse / BPA Driven Protocol    OLANZapine zydis    ondansetron ODT **OR** ondansetron    oxyCODONE    Phosphorus Replacement - Follow Nurse / BPA Driven Protocol    Potassium Replacement - Follow Nurse / BPA Driven Protocol    sodium chloride    sodium chloride      OBJECTIVE    Vital Signs  Vitals:    01/07/24 0209 01/07/24 0401 01/07/24 0542 01/07/24 0700   BP: 178/65 134/50 145/54 140/57   BP Location: Left arm Left arm Left arm Left arm   Patient Position: Lying Lying Lying Lying   Pulse: 67 56 54 55   Resp: 19 13 14 13   Temp: 98.1 °F (36.7 °C)   97.9 °F (36.6 °C)   TempSrc: Oral   Oral   SpO2: 97% 94% 97% 96%   Weight: 60.1 kg (132 lb 7.9 oz)      Height: 152.4 cm (60\")          Flowsheet Rows      Flowsheet Row First Filed Value   Admission Height 152.4 cm (60\") Documented at 01/07/2024 0209   Admission Weight 60.1 kg (132 lb 7.9 oz) Documented at 01/07/2024 0209            No intake or output data in the 24 hours ending 01/07/24 0735     Telemetry: Sinus rhythm    Physical Exam:  The patient is alert, oriented and in no distress.  Vital signs as noted above.  Head and neck revealed no carotid bruits or jugular venous distention.  No thyromegaly or lymphadenopathy is present  Lungs clear.  No wheezing.  Breath sounds are normal bilaterally.  Heart: Normal first and second heart sounds. No murmur.  No precordial rub is present.  No gallop is present.  Abdomen: Soft and nontender.  No organomegaly is present.  Extremities with good peripheral pulses without any pedal edema.  Skin: Warm and dry.  Musculoskeletal system is grossly normal.  CNS grossly normal.       Results Review:  I have personally reviewed the results from the time of this admission to 1/7/2024 07:35 EST and agree with these findings:  []  Laboratory  []  Microbiology  []  Radiology  []  EKG/Telemetry   []  Cardiology/Vascular   []  Pathology  []  Old records  []  Other:    Most notable findings include:     Lab Results (last 24 hours)       " Procedure Component Value Units Date/Time    Comprehensive Metabolic Panel [[id number]]  (Abnormal) Collected: 01/07/24 0427    Specimen: Blood Updated: 01/07/24 0516     Glucose 98 mg/dL      BUN 21 mg/dL      Creatinine 0.94 mg/dL      Sodium 146 mmol/L      Potassium 3.9 mmol/L      Comment: Slight hemolysis detected by analyzer. Result may be falsely elevated.        Chloride 111 mmol/L      CO2 26.0 mmol/L      Calcium 8.5 mg/dL      Total Protein 5.9 g/dL      Albumin 3.2 g/dL      ALT (SGPT) 29 U/L      AST (SGOT) 37 U/L      Comment: Slight hemolysis detected by analyzer. Result may be falsely elevated.        Alkaline Phosphatase 106 U/L      Total Bilirubin 0.3 mg/dL      Globulin 2.7 gm/dL      A/G Ratio 1.2 g/dL      BUN/Creatinine Ratio 22.3     Anion Gap 9.0 mmol/L      eGFR 57.8 mL/min/1.73     Narrative:      GFR Normal >60  Chronic Kidney Disease <60  Kidney Failure <15    The GFR formula is only valid for adults with stable renal function between ages 18 and 70.    CBC Auto Differential [[id number]]  (Abnormal) Collected: 01/07/24 0427    Specimen: Blood Updated: 01/07/24 0439     WBC 6.60 10*3/mm3      RBC 3.50 10*6/mm3      Hemoglobin 10.6 g/dL      Hematocrit 31.8 %      MCV 90.9 fL      MCH 30.2 pg      MCHC 33.2 g/dL      RDW 12.5 %      RDW-SD 41.6 fl      MPV 9.2 fL      Platelets 204 10*3/mm3      Neutrophil % 58.5 %      Lymphocyte % 30.1 %      Monocyte % 6.1 %      Eosinophil % 4.9 %      Basophil % 0.4 %      Neutrophils, Absolute 3.80 10*3/mm3      Lymphocytes, Absolute 2.00 10*3/mm3      Monocytes, Absolute 0.40 10*3/mm3      Eosinophils, Absolute 0.30 10*3/mm3      Basophils, Absolute 0.00 10*3/mm3      nRBC 0.2 /100 WBC             Imaging Results (Last 24 Hours)       ** No results found for the last 24 hours. **            LAB RESULTS (LAST 7 DAYS)    CBC  Results from last 7 days   Lab Units 01/07/24 0427   WBC 10*3/mm3 6.60   RBC 10*6/mm3 3.50*   HEMOGLOBIN g/dL 10.6*    HEMATOCRIT % 31.8*   MCV fL 90.9   PLATELETS 10*3/mm3 204       BMP  Results from last 7 days   Lab Units 01/07/24  0427   SODIUM mmol/L 146*   POTASSIUM mmol/L 3.9   CHLORIDE mmol/L 111*   CO2 mmol/L 26.0   BUN mg/dL 21   CREATININE mg/dL 0.94   GLUCOSE mg/dL 98       CMP   Results from last 7 days   Lab Units 01/07/24  0427   SODIUM mmol/L 146*   POTASSIUM mmol/L 3.9   CHLORIDE mmol/L 111*   CO2 mmol/L 26.0   BUN mg/dL 21   CREATININE mg/dL 0.94   GLUCOSE mg/dL 98   ALBUMIN g/dL 3.2*   BILIRUBIN mg/dL 0.3   ALK PHOS U/L 106   AST (SGOT) U/L 37*   ALT (SGPT) U/L 29       BNP        TROPONIN        CoAg        Creatinine Clearance  Estimated Creatinine Clearance: 32.2 mL/min (by C-G formula based on SCr of 0.94 mg/dL).    ABG          Radiology  No radiology results for the last day      EKG  I personally viewed and interpreted the patient's EKG/Telemetry data:  No orders to display         Echocardiogram:          Stress Test:        Cardiac Catheterization:  No results found for this or any previous visit.        Other:      ASSESSMENT & PLAN:    Principal Problem:    SVT (supraventricular tachycardia)  Active Problems:    Osteoarthritis    Coronary arteriosclerosis    Mixed hyperlipidemia    Benign essential HTN    Pneumonia due to infectious organism    Palpitations  SVT  History of bradycardia  Repeated presentations with palpitations and SVT.  Previously seen by EP and offered EP study with ablation however patient reluctant to have procedures due to advanced age.  Resume beta-blocker.  I have also discussed possibility of cautiously switching her from beta-blocker to Cardizem  Obtain an echocardiogram.  Patient and her daughter would like to further discuss medical management versus ablation.    Hypertension  Typically on lisinopril and hydrochlorothiazide  We will resume and monitor blood pressure.    Coronary artery disease  Minimally elevated high-sensitivity troponin likely due to demand ischemia in the  setting of SVT and underlying coronary disease.  Status post CABG.  Continue aspirin, high intensity statin and beta-blocker.  No further ischemic workup.    Hyperlipidemia  Resume high intensity statin    Win Garzon MD  01/07/24  07:35 EST

## 2024-01-07 NOTE — PLAN OF CARE
Goal Outcome Evaluation:            Pt a/ox4, rr even and unlabored, VSS. Bleeding controlled at this time. Nausea controlled at this time

## 2024-01-08 VITALS
SYSTOLIC BLOOD PRESSURE: 171 MMHG | WEIGHT: 132 LBS | HEIGHT: 60 IN | DIASTOLIC BLOOD PRESSURE: 60 MMHG | HEART RATE: 61 BPM | BODY MASS INDEX: 25.91 KG/M2 | TEMPERATURE: 97.9 F | RESPIRATION RATE: 17 BRPM | OXYGEN SATURATION: 97 %

## 2024-01-08 LAB
ALBUMIN SERPL-MCNC: 3.2 G/DL (ref 3.5–5.2)
ALBUMIN/GLOB SERPL: 1.2 G/DL
ALP SERPL-CCNC: 102 U/L (ref 39–117)
ALT SERPL W P-5'-P-CCNC: 24 U/L (ref 1–33)
ANION GAP SERPL CALCULATED.3IONS-SCNC: 9 MMOL/L (ref 5–15)
AST SERPL-CCNC: 31 U/L (ref 1–32)
BASOPHILS # BLD AUTO: 0 10*3/MM3 (ref 0–0.2)
BASOPHILS NFR BLD AUTO: 0.2 % (ref 0–1.5)
BILIRUB SERPL-MCNC: 0.3 MG/DL (ref 0–1.2)
BUN SERPL-MCNC: 18 MG/DL (ref 8–23)
BUN/CREAT SERPL: 19.8 (ref 7–25)
CALCIUM SPEC-SCNC: 8.6 MG/DL (ref 8.2–9.6)
CHLORIDE SERPL-SCNC: 107 MMOL/L (ref 98–107)
CO2 SERPL-SCNC: 27 MMOL/L (ref 22–29)
CREAT SERPL-MCNC: 0.91 MG/DL (ref 0.57–1)
DEPRECATED RDW RBC AUTO: 39.8 FL (ref 37–54)
EGFRCR SERPLBLD CKD-EPI 2021: 60.1 ML/MIN/1.73
EOSINOPHIL # BLD AUTO: 0.5 10*3/MM3 (ref 0–0.4)
EOSINOPHIL NFR BLD AUTO: 7.2 % (ref 0.3–6.2)
ERYTHROCYTE [DISTWIDTH] IN BLOOD BY AUTOMATED COUNT: 12.6 % (ref 12.3–15.4)
GLOBULIN UR ELPH-MCNC: 2.7 GM/DL
GLUCOSE SERPL-MCNC: 106 MG/DL (ref 65–99)
HCT VFR BLD AUTO: 30.7 % (ref 34–46.6)
HGB BLD-MCNC: 10.5 G/DL (ref 12–15.9)
LYMPHOCYTES # BLD AUTO: 2.2 10*3/MM3 (ref 0.7–3.1)
LYMPHOCYTES NFR BLD AUTO: 34.4 % (ref 19.6–45.3)
MAGNESIUM SERPL-MCNC: 1.8 MG/DL (ref 1.6–2.4)
MCH RBC QN AUTO: 31.1 PG (ref 26.6–33)
MCHC RBC AUTO-ENTMCNC: 34.3 G/DL (ref 31.5–35.7)
MCV RBC AUTO: 90.6 FL (ref 79–97)
MONOCYTES # BLD AUTO: 0.5 10*3/MM3 (ref 0.1–0.9)
MONOCYTES NFR BLD AUTO: 7.5 % (ref 5–12)
NEUTROPHILS NFR BLD AUTO: 3.2 10*3/MM3 (ref 1.7–7)
NEUTROPHILS NFR BLD AUTO: 50.7 % (ref 42.7–76)
NRBC BLD AUTO-RTO: 0 /100 WBC (ref 0–0.2)
PHOSPHATE SERPL-MCNC: 3.1 MG/DL (ref 2.5–4.5)
PLATELET # BLD AUTO: 212 10*3/MM3 (ref 140–450)
PMV BLD AUTO: 9.2 FL (ref 6–12)
POTASSIUM SERPL-SCNC: 3.8 MMOL/L (ref 3.5–5.2)
PROT SERPL-MCNC: 5.9 G/DL (ref 6–8.5)
RBC # BLD AUTO: 3.38 10*6/MM3 (ref 3.77–5.28)
SODIUM SERPL-SCNC: 143 MMOL/L (ref 136–145)
WBC NRBC COR # BLD AUTO: 6.3 10*3/MM3 (ref 3.4–10.8)

## 2024-01-08 PROCEDURE — 99215 OFFICE O/P EST HI 40 MIN: CPT | Performed by: NURSE PRACTITIONER

## 2024-01-08 PROCEDURE — 84100 ASSAY OF PHOSPHORUS: CPT | Performed by: HOSPITALIST

## 2024-01-08 PROCEDURE — G0378 HOSPITAL OBSERVATION PER HR: HCPCS

## 2024-01-08 PROCEDURE — 83735 ASSAY OF MAGNESIUM: CPT | Performed by: HOSPITALIST

## 2024-01-08 PROCEDURE — 85025 COMPLETE CBC W/AUTO DIFF WBC: CPT | Performed by: STUDENT IN AN ORGANIZED HEALTH CARE EDUCATION/TRAINING PROGRAM

## 2024-01-08 PROCEDURE — 80053 COMPREHEN METABOLIC PANEL: CPT | Performed by: STUDENT IN AN ORGANIZED HEALTH CARE EDUCATION/TRAINING PROGRAM

## 2024-01-08 RX ORDER — METOPROLOL SUCCINATE 50 MG/1
50 TABLET, EXTENDED RELEASE ORAL
Status: DISCONTINUED | OUTPATIENT
Start: 2024-01-09 | End: 2024-01-08 | Stop reason: HOSPADM

## 2024-01-08 RX ORDER — METOPROLOL SUCCINATE 50 MG/1
50 TABLET, EXTENDED RELEASE ORAL
Status: DISCONTINUED | OUTPATIENT
Start: 2024-01-08 | End: 2024-01-08

## 2024-01-08 RX ORDER — METOPROLOL SUCCINATE 50 MG/1
50 TABLET, EXTENDED RELEASE ORAL
Qty: 30 TABLET | Refills: 0 | Status: SHIPPED | OUTPATIENT
Start: 2024-01-09 | End: 2024-02-08

## 2024-01-08 RX ORDER — CEFDINIR 300 MG/1
300 CAPSULE ORAL EVERY 12 HOURS SCHEDULED
Qty: 8 CAPSULE | Refills: 0 | Status: SHIPPED | OUTPATIENT
Start: 2024-01-07 | End: 2024-01-12

## 2024-01-08 RX ADMIN — TIMOLOL MALEATE 1 DROP: 5 SOLUTION OPHTHALMIC at 08:43

## 2024-01-08 RX ADMIN — LISINOPRIL 20 MG: 20 TABLET ORAL at 08:43

## 2024-01-08 RX ADMIN — Medication 10 ML: at 08:43

## 2024-01-08 RX ADMIN — METOPROLOL TARTRATE 50 MG: 50 TABLET ORAL at 08:43

## 2024-01-08 RX ADMIN — CEFDINIR 300 MG: 300 CAPSULE ORAL at 08:43

## 2024-01-08 RX ADMIN — ASPIRIN 81 MG: 81 TABLET, COATED ORAL at 08:43

## 2024-01-08 RX ADMIN — HYDROCHLOROTHIAZIDE 25 MG: 25 TABLET ORAL at 08:43

## 2024-01-08 NOTE — PROGRESS NOTES
[facility]        LOS:  LOS: 1 day   Patient Name: Amanda Brown  Age/Sex: 89 y.o. female  : 6/10/1933  MRN: [id number]    Day of Service: 24   Length of Stay: 1  Encounter Provider: ADEEL Thakur  Place of Service: [facility] CARDIOLOGY  Patient Care Team:  Ranulfo Mims MD as PCP - General (Internal Medicine)    Subjective:     Chief Complaint: f/u SVT    Subjective: SR HR 60s. On metoprolol tartrate but once a day only  Change to toprol XL. No acute CV events. No complaints    Current Medications:   Scheduled Meds:aspirin, 81 mg, Oral, Daily  atorvastatin, 40 mg, Oral, Nightly  cefdinir, 300 mg, Oral, Q12H  cefdinir, 300 mg, Oral, Once  enoxaparin, 40 mg, Subcutaneous, Q24H  hydroCHLOROthiazide, 25 mg, Oral, Daily  latanoprost, 1 drop, Both Eyes, Nightly  lisinopril, 20 mg, Oral, BID  metoprolol tartrate, 50 mg, Oral, Daily  senna-docusate sodium, 2 tablet, Oral, BID  sodium chloride, 10 mL, Intravenous, Q12H  timolol, 1 drop, Both Eyes, Q12H      Continuous Infusions:     Allergies:  No Known Allergies    Review of Systems   Constitutional: Negative for chills, diaphoresis and malaise/fatigue.   Cardiovascular:  Negative for chest pain, dyspnea on exertion, irregular heartbeat, leg swelling, near-syncope, orthopnea, palpitations, paroxysmal nocturnal dyspnea and syncope.   Respiratory:  Negative for cough, shortness of breath, sleep disturbances due to breathing and sputum production.    Gastrointestinal:  Negative for change in bowel habit.   Genitourinary:  Negative for urgency.   Neurological:  Negative for dizziness and headaches.   Psychiatric/Behavioral:  Negative for altered mental status.          Objective:     Temp:  [97.7 °F (36.5 °C)-98.1 °F (36.7 °C)] 97.9 °F (36.6 °C)  Heart Rate:  [53-63] 61  Resp:  [11-19] 17  BP: (146-171)/(52-61) 171/60     Intake/Output Summary (Last 24 hours) at 2024 0944  Last data filed at 2024  "1819  Gross per 24 hour   Intake 360 ml   Output --   Net 360 ml     Body mass index is 25.78 kg/m².      01/07/24  0209 01/07/24  1257   Weight: 60.1 kg (132 lb 7.9 oz) 59.9 kg (132 lb)         General Appearance:    Alert, cooperative, in no acute distress                                Head: Atraumatic, normocephalic, PERRLA               Neck:   supple, trachea midline, no thyromegaly, no carotid bruit, no JVD   Lungs:     Clear to auscultation, respirations regular, even and               unlabored    Heart:    Regular rhythm and normal rate, normal S1 and S2   Abdomen:     Normal bowel sounds, no masses, no organomegaly, soft  nontender, nondistended, no guarding, no rebound  tenderness   Extremities:   Moves all extremities well, no edema, no cyanosis, no  redness   Pulses:   Pulses palpable and equal bilaterally   Skin:   No bleeding, bruising or rash   Neurologic:   Awake, alert, oriented x3         Lab Review:   Results from last 7 days   Lab Units 01/08/24  0422 01/07/24  0427   SODIUM mmol/L 143 146*   POTASSIUM mmol/L 3.8 3.9   CHLORIDE mmol/L 107 111*   CO2 mmol/L 27.0 26.0   BUN mg/dL 18 21   CREATININE mg/dL 0.91 0.94   GLUCOSE mg/dL 106* 98   CALCIUM mg/dL 8.6 8.5   AST (SGOT) U/L 31 37*   ALT (SGPT) U/L 24 29         Results from last 7 days   Lab Units 01/08/24  0422 01/07/24  0427   WBC 10*3/mm3 6.30 6.60   HEMOGLOBIN g/dL 10.5* 10.6*   HEMATOCRIT % 30.7* 31.8*   PLATELETS 10*3/mm3 212 204         Results from last 7 days   Lab Units 01/08/24  0422   MAGNESIUM mg/dL 1.8           Invalid input(s): \"LDLCALC\"            Recent Radiology:  Imaging Results (Most Recent)       Procedure Component Value Units Date/Time    XR Chest 1 View [[id number]] Collected: 01/07/24 1217     Updated: 01/07/24 1220    Narrative:      XR CHEST 1 VW    Date of Exam: 1/7/2024 10:10 AM EST    Indication: pna?    Comparison: None available.    Findings:  Sternotomy wires are noted. The heart and vasculature appear normal " in size. There is some generalized coarsening of the pulmonary interstitial markings, and subtle patchy increased density, with peribronchial thickening, adjacent the dome of the left   hemidiaphragm. No pneumothorax is seen. There is minimal if any effusion.      Impression:      Impression:  Mild patchy interstitial changes and peribronchial thickening in the left lung base, questionable for early changes of pneumonia.      Electronically Signed: Ignacio Rose MD    1/7/2024 12:18 PM EST    Workstation ID: [id number]            ECHOCARDIOGRAM:    Results for orders placed during the hospital encounter of 01/07/24    Adult Transthoracic Echo Complete W/ Cont if Necessary Per Protocol    Interpretation Summary    Left ventricular systolic function is normal. Calculated left ventricular EF = 57% Left ventricular ejection fraction appears to be 56 - 60%.    Left ventricular diastolic function is consistent with age.  GLS -16.7%    There is calcification of the aortic valve with no significant stenosis.    Estimated right ventricular systolic pressure from tricuspid regurgitation is mildly elevated (35-45 mmHg).    Mild mitral and tricuspid valve regurgitation is present.        I reviewed the patient's new clinical results.    EKG:      Assessment:       SVT (supraventricular tachycardia)    Osteoarthritis    Coronary arteriosclerosis    Mixed hyperlipidemia    Benign essential HTN    Pneumonia due to infectious organism    SVT / palpitations/ h/o bradycardia  HTN  CAD s/p prior CABG  HLD    Plan:   Patient currently SR on beta blockers  Will change to XL metoprolol  Previously seen by EP and offered EP study however declined due to age  ECHO shows normal LVEF  Will change beta blocker to Toprol XL 50mg daily    Patient can d/c from a CV standpoint        ADEEL Thakur  01/08/24  09:44 EST

## 2024-01-08 NOTE — PLAN OF CARE
Goal Outcome Evaluation:  Plan of Care Reviewed With: patient, daughter        Progress: improving  Outcome Evaluation: VSS, sinus eliazar on the monitor, no episodes of SVT. No c/o chest pain. Up ad prudence. Ambuated around unit with daughter tonight. Resting well overnight. PO Abx started. Plan to be seen by Dr. Aceves today for plan going forward. D/C TBD.

## 2024-01-08 NOTE — CASE MANAGEMENT/SOCIAL WORK
Discharge Planning Assessment  [facility]     Patient Name: Amanda Brown  MRN: [id number]  Today's Date: 1/8/2024    Admit Date: 1/7/2024    Plan: Routine home with family   Discharge Needs Assessment       Row Name 01/08/24 1312       Living Environment    People in Home child(neftali), adult    Name(s) of People in Home parth Barone    Current Living Arrangements home    Potentially Unsafe Housing Conditions none    Primary Care Provided by self    Provides Primary Care For no one    Family Caregiver if Needed child(neftali), adult    Family Caregiver Names indio Barone    Quality of Family Relationships helpful;involved;supportive    Able to Return to Prior Arrangements yes       Resource/Environmental Concerns    Resource/Environmental Concerns none    Transportation Concerns none       Transition Planning    Patient/Family Anticipates Transition to home;home with family    Patient/Family Anticipated Services at Transition none    Transportation Anticipated car, drives self;family or friend will provide       Discharge Needs Assessment    Readmission Within the Last 30 Days no previous admission in last 30 days    Equipment Currently Used at Home none    Concerns to be Addressed no discharge needs identified;denies needs/concerns at this time    Anticipated Changes Related to Illness none    Equipment Needed After Discharge none    Provided Post Acute Provider List? N/A    Provided Post Acute Provider Quality & Resource List? N/A                   Discharge Plan       Row Name 01/08/24 1314       Plan    Plan Routine home with family    Plan Comments CM met with pt and daughter Lizabeth at bedside to discuss discharge needs. Pt lives with daughter, drives and is independent with ADLs. PCP and pharmacy verified. Pt enrolled in [facility] as requested. No current DME/ HHC and no anticipated needs at dc. Daughter will provide transport home.                  Demographic Summary       Row Name 01/08/24 1311       General  Information    Admission Type observation    Arrived From emergency department    Referral Source admission list    Reason for Consult care coordination/care conference;discharge planning    Preferred Language English       Contact Information    Permission Granted to Share Info With                    Functional Status       Row Name 01/08/24 1311       Functional Status    Usual Activity Tolerance moderate    Current Activity Tolerance moderate       Functional Status, IADL    Medications independent    Meal Preparation independent    Housekeeping independent    Laundry independent    Shopping independent       Mental Status Summary    Recent Changes in Mental Status/Cognitive Functioning no changes             Tonja Wganer RN      Office phone: [phone]  Office fax: [phone]

## 2024-01-08 NOTE — DISCHARGE SUMMARY
[facility] Medicine Services  Discharge Summary    Date of Service: 2024  Patient Name: Amanda Brown  : 6/10/1933  MRN: [id number]    Date of Admission: 2024  Discharge Diagnosis: SVT  Date of Discharge: 2024  Primary Care Physician: Ranulfo Mims MD      Presenting Problem:   SVT (supraventricular tachycardia) [I47.10]    Active and Resolved Hospital Problems:  Active Hospital Problems    Diagnosis POA    **SVT (supraventricular tachycardia) [I47.10] Yes    Pneumonia due to infectious organism [J18.9] Yes    Benign essential HTN [I10] Yes    Mixed hyperlipidemia [E78.2] Yes    Coronary arteriosclerosis [I25.10] Yes    Osteoarthritis [M19.90] Yes      Resolved Hospital Problems   No resolved problems to display.         Hospital Course     HPI:  Per the H&P     Hospital Course:  Patient admitted due to complaints of palpitations.  Reports progressively worsened cough, began azithromycin after seeing a provider.  After beginning medication noticed palpitations.  Upon arrival to [place] ED, heart rate in the 200s.  Patient heart rate stabilized with IV metoprolol and Cardizem drip.  Patient follows Dr. Aceves outpatient, Dr. Aceves consulted.    Azithromycin was discontinued, Omnicef 300 mg twice daily initiated for patient pneumonia.    Cardiology consulted on patient, patient medications adjusted and patient cleared for discharge per cardiology.  Patient to follow-up with Dr. Aceves in 2 weeks.  Patient to be discharged home with family.        DISCHARGE Follow Up Recommendations for labs and diagnostics: Dr. Aceves, cardiology in 2 weeks      Reasons For Change In Medications and Indications for New Medications:  Omnicef 300 mg twice daily for pneumonia  Toprol XL 50 mg daily    Day of Discharge     Vital Signs:  Temp:  [97.7 °F (36.5 °C)-98.1 °F (36.7 °C)] 97.9 °F (36.6 °C)  Heart Rate:  [53-63] 61  Resp:  [11-17] 17  BP: (146-171)/(52-61) 171/60    Physical  Exam:  Physical Exam  Constitutional:       Appearance: Normal appearance.   HENT:      Head: Normocephalic and atraumatic.      Nose: Nose normal.      Mouth/Throat:      Mouth: Mucous membranes are moist.      Pharynx: Oropharynx is clear.   Eyes:      Extraocular Movements: Extraocular movements intact.      Conjunctiva/sclera: Conjunctivae normal.      Pupils: Pupils are equal, round, and reactive to light.   Cardiovascular:      Rate and Rhythm: Normal rate and regular rhythm.      Pulses: Normal pulses.      Heart sounds: Normal heart sounds.   Pulmonary:      Effort: Pulmonary effort is normal.      Breath sounds: Normal breath sounds.   Abdominal:      General: Abdomen is flat. Bowel sounds are normal.      Palpations: Abdomen is soft.   Musculoskeletal:         General: Normal range of motion.      Cervical back: Normal range of motion and neck supple.   Skin:     General: Skin is warm and dry.   Neurological:      General: No focal deficit present.      Mental Status: She is alert and oriented to person, place, and time. Mental status is at baseline.   Psychiatric:         Mood and Affect: Mood normal.         Behavior: Behavior normal.         Thought Content: Thought content normal.         Judgment: Judgment normal.            Pertinent  and/or Most Recent Results     LAB RESULTS:      Lab 01/08/24  0422 01/07/24 0427   WBC 6.30 6.60   HEMOGLOBIN 10.5* 10.6*   HEMATOCRIT 30.7* 31.8*   PLATELETS 212 204   NEUTROS ABS 3.20 3.80   LYMPHS ABS 2.20 2.00   MONOS ABS 0.50 0.40   EOS ABS 0.50* 0.30   MCV 90.6 90.9         Lab 01/08/24  0422 01/07/24 0427   SODIUM 143 146*   POTASSIUM 3.8 3.9   CHLORIDE 107 111*   CO2 27.0 26.0   ANION GAP 9.0 9.0   BUN 18 21   CREATININE 0.91 0.94   EGFR 60.1 57.8*   GLUCOSE 106* 98   CALCIUM 8.6 8.5   MAGNESIUM 1.8  --    PHOSPHORUS 3.1  --          Lab 01/08/24  0422 01/07/24 0427   TOTAL PROTEIN 5.9* 5.9*   ALBUMIN 3.2* 3.2*   GLOBULIN 2.7 2.7   ALT (SGPT) 24 29   AST  (SGOT) 31 37*   BILIRUBIN 0.3 0.3   ALK PHOS 102 106                     Brief Urine Lab Results       None          Microbiology Results (last 10 days)       ** No results found for the last 240 hours. **            XR Chest 1 View    Result Date: 1/7/2024  Impression: Impression: Mild patchy interstitial changes and peribronchial thickening in the left lung base, questionable for early changes of pneumonia. Electronically Signed: Ignacio Rose MD  1/7/2024 12:18 PM EST  Workstation ID: [id number]             Results for orders placed during the hospital encounter of 01/07/24    Adult Transthoracic Echo Complete W/ Cont if Necessary Per Protocol    Interpretation Summary    Left ventricular systolic function is normal. Calculated left ventricular EF = 57% Left ventricular ejection fraction appears to be 56 - 60%.    Left ventricular diastolic function is consistent with age.  GLS -16.7%    There is calcification of the aortic valve with no significant stenosis.    Estimated right ventricular systolic pressure from tricuspid regurgitation is mildly elevated (35-45 mmHg).    Mild mitral and tricuspid valve regurgitation is present.      Labs Pending at Discharge:      Procedures Performed           Consults:   Consults       Date and Time Order Name Status Description    1/7/2024  2:33 AM Inpatient Cardiology Consult Completed               Discharge Details        Discharge Medications        New Medications        Instructions Start Date   cefdinir 300 MG capsule  Commonly known as: OMNICEF   300 mg, Oral, Every 12 Hours Scheduled      metoprolol succinate XL 50 MG 24 hr tablet  Commonly known as: TOPROL-XL   50 mg, Oral, Every 24 Hours Scheduled   Start Date: January 9, 2024            Continue These Medications        Instructions Start Date   aspirin 81 MG EC tablet   81 mg, Oral, Daily, LD 11/29      atorvastatin 40 MG tablet  Commonly known as: LIPITOR   40 mg, Oral, 2 Times Daily      hydroCHLOROthiazide 25 MG  tablet  Commonly known as: HYDRODIURIL   No dose, route, or frequency recorded.      latanoprost 0.005 % ophthalmic solution  Commonly known as: XALATAN   latanoprost 0.005 % eye drops      lisinopril 20 MG tablet  Commonly known as: PRINIVIL,ZESTRIL   20 mg, Oral, 2 Times Daily      timolol 0.5 % ophthalmic solution  Commonly known as: TIMOPTIC   timolol maleate 0.5 % eye drops             Stop These Medications      metoprolol tartrate 50 MG tablet  Commonly known as: LOPRESSOR              No Known Allergies      Discharge Disposition:   Home or Self Care    Diet:  Hospital:  Diet Order   Procedures    Diet: Cardiac Diets; Healthy Heart (2-3 Na+); Texture: Regular Texture (IDDSI 7); Fluid Consistency: Thin (IDDSI 0)         Discharge Activity:         CODE STATUS:  Code Status and Medical Interventions:   Ordered at: 01/07/24 0233     Level Of Support Discussed With:    Patient     Code Status (Patient has no pulse and is not breathing):    CPR (Attempt to Resuscitate)     Medical Interventions (Patient has pulse or is breathing):    Full Support         No future appointments.    Additional Instructions for the Follow-ups that You Need to Schedule       Discharge Follow-up with Specified Provider: Dr. Aceves; 2 Weeks   As directed      To: Dr. Aceves   Follow Up: 2 Weeks                Time spent on Discharge including face to face service:  >30 minutes    Signature: Electronically signed by ADEEL Gustafson, 01/08/24, 13:21 EST.  [facility] Hospitalist Team

## 2024-01-09 NOTE — CASE MANAGEMENT/SOCIAL WORK
Case Management Discharge Note      Final Note: Routine home        Selected Continued Care - Discharged on 1/8/2024 Admission date: 1/7/2024 - Discharge disposition: Home or Self Care   Transportation Services  Private: Car    Final Discharge Disposition Code: 01 - home or self-care

## 2024-02-02 NOTE — PROGRESS NOTES
Date of Office Visit: 2024  Encounter Provider: Dr. Deep Aceves  Place of Service: [facility] CARDIOLOGY [place]  Patient Name: Amanda Brown  :6/10/1933  Ranulfo Mims MD    Chief Complaint   Patient presents with    Coronary Artery Disease    Hyperlipidemia    Follow-up    Hypertension     History of Present Illness    I am pleased to see Mrs. Brown in my office today as a follow-up.    As you know, patient is a 89-year-old white female whose past medical history significant for hypertension, hyperlipidemia, CAD, CABG, who came today for follow-up.    In 2021, patient had fall due to tripping at local [employer] store.  She broke her left shoulder/humerus.  Patient was recommended to have surgery but she was reluctant and was treated conservatively.  Patient follows with Dr. Aguila.  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    In 2023, patient was admitted at [facility] with symptom of palpitation and was noted to have narrow complex tachycardia consistent with SVT.  Patient was cardioverted back into sinus rhythm with Cardizem.  Patient was referred to Dr. Kelli Mohr and she was noted to be in SVT.  Patient was offered ablation but family and the patient refused.  In 2024, patient was again admitted at [facility] with symptom of SVT.  It was aborted.  Patient was discharged home on beta-blocker.    Patient came today and patient is feeling better.  Patient blood pressure is desirable heart rate is stable.  Denies any orthopnea, PND, syncope or presyncope.  No leg edema noted.    At this stage, I would recommend that patient should be considering for possibility of SVT ablation.  Patient is slightly reluctant.  Medical treatment would be associated with side effects.  Patient has probably underlying sick sinus syndrome and heart rate is low..      Past Medical History:   Diagnosis Date    Coronary artery disease     Fracture,  humerus 2021    left    GERD (gastroesophageal reflux disease)     Hyperlipidemia     Hypertension     PONV (postoperative nausea and vomiting)          Past Surgical History:   Procedure Laterality Date    ARTERIAL BYPASS SURGERY      CARDIAC CATHETERIZATION      CARDIAC SURGERY  2009    CABG 4V    CORONARY ARTERY BYPASS GRAFT             Current Outpatient Medications:     aspirin 81 MG EC tablet, Take 1 tablet by mouth Daily. LD 11/29, Disp: , Rfl:     atorvastatin (LIPITOR) 40 MG tablet, Take 1 tablet by mouth 2 (Two) Times a Day., Disp: , Rfl:     hydroCHLOROthiazide (HYDRODIURIL) 25 MG tablet, , Disp: , Rfl:     latanoprost (XALATAN) 0.005 % ophthalmic solution, latanoprost 0.005 % eye drops, Disp: , Rfl:     lisinopril (PRINIVIL,ZESTRIL) 20 MG tablet, Take 1 tablet by mouth 2 (Two) Times a Day., Disp: , Rfl:     metoprolol succinate XL (TOPROL-XL) 50 MG 24 hr tablet, Take 1 tablet by mouth Daily for 30 days., Disp: 30 tablet, Rfl: 0    timolol (TIMOPTIC) 0.5 % ophthalmic solution, timolol maleate 0.5 % eye drops, Disp: , Rfl:       Social History     Socioeconomic History    Marital status:    Tobacco Use    Smoking status: Never    Smokeless tobacco: Never   Vaping Use    Vaping Use: Never used   Substance and Sexual Activity    Alcohol use: Never    Drug use: Never    Sexual activity: Defer         Review of Systems   Constitutional: Negative for chills and fever.   HENT:  Negative for ear discharge and nosebleeds.    Eyes:  Negative for discharge and redness.   Cardiovascular:  Negative for chest pain, orthopnea, palpitations, paroxysmal nocturnal dyspnea and syncope.   Respiratory:  Negative for cough, shortness of breath and wheezing.    Endocrine: Negative for heat intolerance.   Skin:  Negative for rash.   Musculoskeletal:  Negative for arthritis and myalgias.   Gastrointestinal:  Negative for abdominal pain, melena, nausea and vomiting.   Genitourinary:  Negative for dysuria and hematuria.  "  Neurological:  Negative for dizziness, light-headedness, numbness and tremors.   Psychiatric/Behavioral:  Negative for depression. The patient is not nervous/anxious.        Procedures    Procedures    No orders to display           Objective:    /55 (BP Location: Right arm, Patient Position: Sitting, Cuff Size: Large Adult)   Pulse 58   Resp 16   Ht 152.4 cm (60\")   Wt 59.9 kg (132 lb)   SpO2 96%   BMI 25.78 kg/m²         Constitutional:       Appearance: Well-developed.   Eyes:      General: No scleral icterus.        Right eye: No discharge.   HENT:      Head: Normocephalic and atraumatic.   Neck:      Thyroid: No thyromegaly.      Lymphadenopathy: No cervical adenopathy.   Pulmonary:      Effort: Pulmonary effort is normal. No respiratory distress.      Breath sounds: Normal breath sounds. No wheezing. No rales.   Cardiovascular:      Normal rate. Regular rhythm.      No gallop.    Edema:     Peripheral edema absent.   Abdominal:      Tenderness: There is no abdominal tenderness.   Skin:     Findings: No erythema or rash.   Neurological:      Mental Status: Alert and oriented to person, place, and time.             Assessment:       Diagnosis Plan   1. Coronary artery disease involving native coronary artery of native heart without angina pectoris        2. Dyslipidemia        3. Benign essential HTN        4. SVT (supraventricular tachycardia)                 Plan:       MDM:    1.  SVT:    I would recommend that patient should be considered for ablation but patient is still reluctant.  Continue beta-blocker if there is recurrence of SVT I would definitely recommend ablative therapy.  Patient has underlying sick sinus syndrome and antiarrhythmic therapy would be associated with probably significant bradycardia arrhythmia.    2.  Hypertension:    Blood pressure is stable continue current treatment    3.  Hyperlipidemia:    Patient is on Lipitor.  Repeat lipid panel testing.    4.  " CAD/CABG:    Clinically patient is free of angina pectoris

## 2024-02-05 ENCOUNTER — OFFICE VISIT (OUTPATIENT)
Dept: CARDIOLOGY | Facility: CLINIC | Age: 89
End: 2024-02-05
Payer: MEDICARE

## 2024-02-05 VITALS
WEIGHT: 132 LBS | HEART RATE: 58 BPM | BODY MASS INDEX: 25.91 KG/M2 | HEIGHT: 60 IN | SYSTOLIC BLOOD PRESSURE: 138 MMHG | DIASTOLIC BLOOD PRESSURE: 55 MMHG | OXYGEN SATURATION: 96 % | RESPIRATION RATE: 16 BRPM

## 2024-02-05 DIAGNOSIS — I25.10 CORONARY ARTERY DISEASE INVOLVING NATIVE CORONARY ARTERY OF NATIVE HEART WITHOUT ANGINA PECTORIS: Primary | ICD-10-CM

## 2024-02-05 DIAGNOSIS — E78.5 DYSLIPIDEMIA: ICD-10-CM

## 2024-02-05 DIAGNOSIS — I10 BENIGN ESSENTIAL HTN: ICD-10-CM

## 2024-02-05 DIAGNOSIS — I47.10 SVT (SUPRAVENTRICULAR TACHYCARDIA): ICD-10-CM

## 2024-07-25 NOTE — PROGRESS NOTES
Date of Office Visit: 2024  Encounter Provider: Dr. Deep Aceves  Place of Service: [facility] CARDIOLOGY [place]  Patient Name: Amanda Brown  :6/10/1933  Ranulfo Mims MD    Chief Complaint   Patient presents with    Follow-up     6 mo f/u     History of Present Illness    I am pleased to see Mrs. Brown in my office today as a follow-up.    As you know, patient is a 89-year-old white female whose past medical history significant for hypertension, hyperlipidemia, CAD, CABG, who came today for follow-up.    In 2021, patient had fall due to tripping at local [employer].  She broke her left shoulder/humerus.  Patient was recommended to have surgery but she was reluctant and was treated conservatively.  Patient follows with Dr. Aguila.  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    In 2023, patient was admitted at [facility] with symptom of palpitation and was noted to have narrow complex tachycardia consistent with SVT.  Patient was cardioverted back into sinus rhythm with Cardizem.  Patient was referred to Dr. Kelli Mohr and she was noted to be in SVT.  Patient was offered ablation but family and the patient refused.  In 2024, patient was again admitted at [facility] with symptom of SVT.  It was aborted.  Patient was discharged home on beta-blocker.    Patient came today for follow-up.  She is stable.  About a month ago, patient had an episode of palpitation in which heart rate was racing fast.  She was lightheaded and dizzy.  It lasted for 45 minutes.  Patient broke it with Valsalva maneuver.  Patient denies any orthopnea, PND, syncope or presyncope.  No leg edema noted.  No orthopnea or PND.    Patient is in sinus rhythm with a heart rate of 60 bpm.  Right bundle branch block is noted    Patient had 1 episode of SVT which lasted for 40 to 45 minutes.  I discussed with the patient about possibility of invasive ablative therapy  but she is still reluctant.  After discussion agreed to allow the patient to take half a tablet of metoprolol at the time of episode and if it does not abort in 30 minutes patient should come to the hospital.  Patient is advised to take precautions against fall.  Monitor blood pressure and heart rate.      Past Medical History:   Diagnosis Date    Coronary artery disease     Fracture, humerus 2021    left    GERD (gastroesophageal reflux disease)     Hyperlipidemia     Hypertension     PONV (postoperative nausea and vomiting)          Past Surgical History:   Procedure Laterality Date    ARTERIAL BYPASS SURGERY      CARDIAC CATHETERIZATION      CARDIAC SURGERY  2009    CABG 4V    CORONARY ARTERY BYPASS GRAFT             Current Outpatient Medications:     aspirin 81 MG EC tablet, Take 1 tablet by mouth Daily. LD 11/29, Disp: , Rfl:     atorvastatin (LIPITOR) 40 MG tablet, Take 1 tablet by mouth Daily., Disp: , Rfl:     hydroCHLOROthiazide (HYDRODIURIL) 25 MG tablet, , Disp: , Rfl:     lisinopril (PRINIVIL,ZESTRIL) 20 MG tablet, Take 1 tablet by mouth 2 (Two) Times a Day., Disp: , Rfl:     Lumigan 0.01 % ophthalmic drops, Administer 1 drop to both eyes Every Night., Disp: , Rfl:     timolol (TIMOPTIC) 0.5 % ophthalmic solution, timolol maleate 0.5 % eye drops, Disp: , Rfl:     metoprolol succinate XL (TOPROL-XL) 50 MG 24 hr tablet, Take 1 tablet by mouth Daily for 30 days., Disp: 30 tablet, Rfl: 0      Social History     Socioeconomic History    Marital status:    Tobacco Use    Smoking status: Never     Passive exposure: Never    Smokeless tobacco: Never   Vaping Use    Vaping status: Never Used   Substance and Sexual Activity    Alcohol use: Never    Drug use: Never    Sexual activity: Defer         Review of Systems   Constitutional: Negative for chills and fever.   HENT:  Negative for ear discharge and nosebleeds.    Eyes:  Negative for discharge and redness.   Cardiovascular:  Positive for palpitations.  "Negative for chest pain, orthopnea, paroxysmal nocturnal dyspnea and syncope.   Respiratory:  Negative for cough, shortness of breath and wheezing.    Endocrine: Negative for heat intolerance.   Skin:  Negative for rash.   Musculoskeletal:  Negative for arthritis and myalgias.   Gastrointestinal:  Negative for abdominal pain, melena, nausea and vomiting.   Genitourinary:  Negative for dysuria and hematuria.   Neurological:  Negative for dizziness, light-headedness, numbness and tremors.   Psychiatric/Behavioral:  Negative for depression. The patient is not nervous/anxious.        Procedures    Procedures    No orders to display           Objective:    /48 (BP Location: Left arm, Patient Position: Sitting, Cuff Size: Adult)   Pulse 60   Ht 152.4 cm (60\")   Wt 59.4 kg (131 lb)   SpO2 97%   BMI 25.58 kg/m²         Constitutional:       Appearance: Well-developed.   Eyes:      General: No scleral icterus.        Right eye: No discharge.   HENT:      Head: Normocephalic and atraumatic.   Neck:      Thyroid: No thyromegaly.      Lymphadenopathy: No cervical adenopathy.   Pulmonary:      Effort: Pulmonary effort is normal. No respiratory distress.      Breath sounds: Normal breath sounds. No wheezing. No rales.   Cardiovascular:      Normal rate. Regular rhythm.      No gallop.    Edema:     Peripheral edema absent.   Abdominal:      Tenderness: There is no abdominal tenderness.   Skin:     Findings: No erythema or rash.   Neurological:      Mental Status: Alert and oriented to person, place, and time.             Assessment:       Diagnosis Plan   1. Coronary artery disease involving native coronary artery of native heart without angina pectoris [I25.10]        2. SVT (supraventricular tachycardia)        3. Benign essential HTN        4. Mixed hyperlipidemia                 Plan:       MDM:    1.  Paroxysmal atrial fibrillation:    Patient had only 1 episode in May 2024 since the previous visit about SVT.  It " lasted for 40 to 40 minutes.  Patient again discussed about possibility of ablation but they are reluctant.  However if there are more episodes in future I would definitely recommend SVT ablation.    2.  Hypertension:    Blood pressure is controlled current treatment would be continued with lisinopril and metoprolol    3.  Mixed hyperlipidemia:    Patient is on Lipitor.  Repeat lipid panel testing    4.  Leg edema:    Patient has trace leg edema will recommend to decrease salt intake.  Continue hydrochlorothiazide.    5.  CAD/CABG:    Patient is free of chest pain.  Recommend observation

## 2024-07-29 ENCOUNTER — OFFICE VISIT (OUTPATIENT)
Dept: CARDIOLOGY | Facility: CLINIC | Age: 89
End: 2024-07-29
Payer: MEDICARE

## 2024-07-29 VITALS
DIASTOLIC BLOOD PRESSURE: 48 MMHG | OXYGEN SATURATION: 97 % | BODY MASS INDEX: 25.72 KG/M2 | SYSTOLIC BLOOD PRESSURE: 134 MMHG | HEIGHT: 60 IN | WEIGHT: 131 LBS | HEART RATE: 60 BPM

## 2024-07-29 DIAGNOSIS — I10 BENIGN ESSENTIAL HTN: ICD-10-CM

## 2024-07-29 DIAGNOSIS — E78.2 MIXED HYPERLIPIDEMIA: ICD-10-CM

## 2024-07-29 DIAGNOSIS — I25.10 CORONARY ARTERY DISEASE INVOLVING NATIVE CORONARY ARTERY OF NATIVE HEART WITHOUT ANGINA PECTORIS: Primary | ICD-10-CM

## 2024-07-29 DIAGNOSIS — I47.10 SVT (SUPRAVENTRICULAR TACHYCARDIA): ICD-10-CM

## 2024-07-29 RX ORDER — BIMATOPROST 0.1 MG/ML
1 SOLUTION/ DROPS OPHTHALMIC NIGHTLY
COMMUNITY
Start: 2024-07-08

## 2025-01-21 NOTE — TELEPHONE ENCOUNTER
Caller: ZEINAB BARRY     Best call back number: [phone]     What is your medical concern? PT DAUGHTER REACHING OUT TO LET MD KNOW THAT PT HAS EXPIERIENCED EPISODES OF HIGH HR AND PT STATES THAT SHE FEELS VERY BAD WHEN HER HR GETS UP - HER USUAL HR IS AROUND 60 AND PT DAUGHTER GOT A FEW READINGS INCLUDING 180/71 ON SEPT 20 WITH A PULSE OF 73 /61 HR 78 LAST NIGHT - THEY ARE NOT SURE IF IT IS WHERE MEDICATION WAS DECREASED BUT SEEKING ADVISEMENT     How long has this issue been going on? SINCE LAST VISIT     Is your provider already aware of this issue? YES    Have you been treated for this issue? SOMEWHAT   Roomed, verified name and date of birth by RANULFO Cameron.

## 2025-04-25 ENCOUNTER — TELEPHONE (OUTPATIENT)
Dept: CARDIOLOGY | Facility: CLINIC | Age: OVER 89
End: 2025-04-25
Payer: MEDICARE

## 2025-04-25 NOTE — TELEPHONE ENCOUNTER
Caller: ZEINAB BARRY    Relationship to patient: Emergency Contact    Best call back number: [phone]    Patient is needing: ELEVATED BP ON AVERAGE HAS BEEN RUNNING AROUND 170/67 PULSE IN THE 60S.    WANTS TO DISCUSS FURTHER.

## 2025-04-29 ENCOUNTER — OFFICE VISIT (OUTPATIENT)
Dept: CARDIOLOGY | Facility: CLINIC | Age: OVER 89
End: 2025-04-29
Payer: MEDICARE

## 2025-04-29 VITALS
HEIGHT: 60 IN | DIASTOLIC BLOOD PRESSURE: 68 MMHG | SYSTOLIC BLOOD PRESSURE: 163 MMHG | WEIGHT: 132 LBS | RESPIRATION RATE: 16 BRPM | HEART RATE: 55 BPM | OXYGEN SATURATION: 97 % | BODY MASS INDEX: 25.91 KG/M2

## 2025-04-29 DIAGNOSIS — E78.2 MIXED HYPERLIPIDEMIA: ICD-10-CM

## 2025-04-29 DIAGNOSIS — I10 BENIGN ESSENTIAL HTN: Primary | ICD-10-CM

## 2025-04-29 DIAGNOSIS — I25.10 CORONARY ARTERY DISEASE INVOLVING NATIVE CORONARY ARTERY OF NATIVE HEART WITHOUT ANGINA PECTORIS: ICD-10-CM

## 2025-04-29 PROCEDURE — 1159F MED LIST DOCD IN RCRD: CPT | Performed by: NURSE PRACTITIONER

## 2025-04-29 PROCEDURE — 1160F RVW MEDS BY RX/DR IN RCRD: CPT | Performed by: NURSE PRACTITIONER

## 2025-04-29 PROCEDURE — 93000 ELECTROCARDIOGRAM COMPLETE: CPT | Performed by: NURSE PRACTITIONER

## 2025-04-29 PROCEDURE — 99214 OFFICE O/P EST MOD 30 MIN: CPT | Performed by: NURSE PRACTITIONER

## 2025-04-29 RX ORDER — MECLIZINE HYDROCHLORIDE 25 MG/1
25 TABLET ORAL DAILY
COMMUNITY
Start: 2025-03-21

## 2025-04-29 RX ORDER — OMEPRAZOLE 20 MG/1
20 CAPSULE, DELAYED RELEASE ORAL
COMMUNITY

## 2025-04-29 NOTE — PROGRESS NOTES
Cardiology Office Follow Up Visit      Primary Care Provider:  Ranulfo Mims MD    Reason for f/u:     Hypertension  SVT  Coronary artery disease  Previous CABG      Subjective     CC:    Denies chest pain or dyspnea    History of Present Illness       Amanda Brown is a 89 y.o. female.  Patient is a 89-year-old female who is routinely followed by Dr. Aceves.  She is known to have hypertension, dyslipidemia, coronary artery disease and previous CABG.    Patient has a history of previous SVT.  She was evaluated by electrophysiology and offered an ablation but patient and the family refused.  She had recurrent SVT requiring hospitalization in January 2024.  She was started on oral beta-blockers at that time.    Patient has contacted the office due to some reported elevated blood pressures at home and was advised to come into the office for follow-up.    Patient's daughter is with her and presents with a list of her blood pressures.  She has had some elevated blood pressures.  It appears most of them are in the morning hours.  The patient denies any new symptoms.  She denies chest pain, dyspnea, PND, orthopnea, palpitations or feelings of her heart racing.      ASSESSMENT/PLAN:      Diagnoses and all orders for this visit:    1. Benign essential HTN (Primary)    2. Coronary artery disease involving native coronary artery of native heart without angina pectoris    3. Mixed hyperlipidemia            MEDICAL DECISION MAKING:    I reviewed the patient's blood pressure log.  We also have compared our findings of her blood pressure to her home monitor.  The systolic blood pressure is approximately 10-15 points lower on our manual cuff than the patient's automatic monitor.    Based on these findings and discussion with the patient and her daughter and the timing of her morning elevated blood pressures we have discussed cutting her Toprol-XL in half and taking half in the morning and half in the evening time.  I have  asked him to log and check her blood pressures over the next week and send them to our office.  We have discussed that we do not want to overtreat her blood pressure for fear of making her dizzy or lightheaded.    I did check orthostatic blood pressures with a manual cuff and there was no significant change in her blood pressure which was 158/70 sitting as well as standing.    She is not having symptoms of angina or recurrent SVT currently.  EKG today shows sinus bradycardia with a heart rate of 55.  There is a right bundle branch block.    Additional recommendations to be made after review of her blood pressures in 1 week.  At any point if she has any new or worsening symptoms of asked her to contact the office sooner.  She will keep her July appointment in [place] with Dr. Aceves        Past Medical History:   Diagnosis Date    Coronary artery disease     Fracture, humerus 2021    left    GERD (gastroesophageal reflux disease)     Hyperlipidemia     Hypertension     PONV (postoperative nausea and vomiting)        Past Surgical History:   Procedure Laterality Date    ARTERIAL BYPASS SURGERY      CARDIAC CATHETERIZATION      CARDIAC SURGERY  2009    CABG 4V    CORONARY ARTERY BYPASS GRAFT           Current Outpatient Medications:     aspirin 81 MG EC tablet, Take 1 tablet by mouth Daily. LD 11/29, Disp: , Rfl:     atorvastatin (LIPITOR) 40 MG tablet, Take 1 tablet by mouth Daily., Disp: , Rfl:     B Complex Vitamins (VITAMIN B COMPLEX PO), Take  by mouth 2 (Two) Times a Week., Disp: , Rfl:     hydroCHLOROthiazide (HYDRODIURIL) 25 MG tablet, Take 1 tablet by mouth Daily., Disp: , Rfl:     lisinopril (PRINIVIL,ZESTRIL) 20 MG tablet, Take 1 tablet by mouth 2 (Two) Times a Day., Disp: , Rfl:     Lumigan 0.01 % ophthalmic drops, Administer 1 drop to both eyes Every Night., Disp: , Rfl:     meclizine (ANTIVERT) 25 MG tablet, Take 1 tablet by mouth Daily., Disp: , Rfl:     metoprolol succinate XL (TOPROL-XL) 50 MG 24 hr  "tablet, Take 1 tablet by mouth Daily for 30 days., Disp: 30 tablet, Rfl: 0    omeprazole (priLOSEC) 20 MG capsule, Take 1 capsule by mouth 4 (Four) Times a Week., Disp: , Rfl:     timolol (TIMOPTIC) 0.5 % ophthalmic solution, timolol maleate 0.5 % eye drops, Disp: , Rfl:     Social History     Socioeconomic History    Marital status:    Tobacco Use    Smoking status: Never     Passive exposure: Never    Smokeless tobacco: Never   Vaping Use    Vaping status: Never Used   Substance and Sexual Activity    Alcohol use: Never    Drug use: Never    Sexual activity: Defer       Family History   Problem Relation Age of Onset    Lymphoma Mother     Stroke Mother     Asthma Father        The following portions of the patient's history were reviewed and updated as appropriate: allergies, current medications, past family history, past medical history, past social history, past surgical history and problem list.    Review of Systems   All other systems reviewed and are negative.      Pertinent items are noted in HPI, all other systems reviewed and negative    /68 (BP Location: Right arm, Patient Position: Sitting)   Pulse 55 Comment: ekg  Resp 16   Ht 152.4 cm (60\")   Wt 59.9 kg (132 lb)   SpO2 97%   BMI 25.78 kg/m² .  Objective     Vitals reviewed.   Constitutional:       General: Not in acute distress.     Appearance: Normal appearance. Well-developed.   Eyes:      Pupils: Pupils are equal, round, and reactive to light.   HENT:      Head: Normocephalic and atraumatic.   Neck:      Vascular: No JVD.   Pulmonary:      Effort: Pulmonary effort is normal.      Breath sounds: Normal breath sounds.   Cardiovascular:      Normal rate. Regular rhythm.   Edema:     Peripheral edema absent.   Abdominal:      General: There is no distension.      Palpations: Abdomen is soft.      Tenderness: There is no abdominal tenderness.   Musculoskeletal: Normal range of motion.      Cervical back: Normal range of motion and neck " supple. Skin:     General: Skin is warm and dry.   Neurological:      Mental Status: Alert and oriented to person, place, and time.             ECG 12 Lead    Date/Time: 4/29/2025 9:23 AM  Performed by: Olga Lidia Russell APRN    Authorized by: Olga Lidia Russell APRN  Comparison: compared with previous ECG   Rhythm: sinus bradycardia  Rate: bradycardic  BPM: 55  Conduction: right bundle branch block  QRS axis: normal  Other: no other findings          EKG ordered by and reviewed by me in office

## 2025-05-08 ENCOUNTER — TELEPHONE (OUTPATIENT)
Dept: CARDIOLOGY | Facility: CLINIC | Age: OVER 89
End: 2025-05-08
Payer: MEDICARE

## 2025-05-08 RX ORDER — METOPROLOL SUCCINATE 25 MG/1
25 TABLET, EXTENDED RELEASE ORAL 2 TIMES DAILY
Qty: 180 TABLET | Refills: 3 | Status: SHIPPED | OUTPATIENT
Start: 2025-05-08

## 2025-05-08 NOTE — TELEPHONE ENCOUNTER
Patients daughter dropped off bp readings today She is taking metoprolol 50 mg  She takes 1/2 a pill in the morning and evening  If this dose is working can you send in 25 mg tablets for 2x daily so she doesn't have to cut them in half  BP readings are in the chart

## 2025-05-13 ENCOUNTER — TELEPHONE (OUTPATIENT)
Dept: CARDIOLOGY | Facility: CLINIC | Age: OVER 89
End: 2025-05-13

## 2025-05-13 NOTE — TELEPHONE ENCOUNTER
PATIENTS DAUGHTER CALLED BACK:                      10AM                3PM  5.12- 106/57 , 117/63 HR87                      9AM            11AM               2PM  5.13- 138/81 HR77, 122/59 HR94, 123/55 HR75    SHE ALSO NOTED THAT HER BP CUFF WAS READING 16PTS HIGHER THEN THE DR OFFICE LAST TIME SHE BROUGHT IT IN.      SHE DOES NOT HAVE ACCESS TO MY CHART AND IS REQUESTING A CALL BACK PH: [phone]

## 2025-05-13 NOTE — TELEPHONE ENCOUNTER
Caller: ZEINAB BARRY    Relationship to patient: Emergency Contact    Best call back number: [phone]    Patient is needing: PT'S DAUGHTER STATES PT'S BP IS RUNNING HIGH AGAIN. PT'S TOP NUMBER IS LOWER THAN BEFORE BUT BOTTOM NUMBER IS RISING. PULSE RATE IS ALSO RISING.  PLEASE CALL TO ADVISE.

## 2025-05-25 ENCOUNTER — HOSPITAL ENCOUNTER (INPATIENT)
Facility: HOSPITAL | Age: OVER 89
LOS: 3 days | Discharge: HOME | End: 2025-05-29
Attending: STUDENT IN AN ORGANIZED HEALTH CARE EDUCATION/TRAINING PROGRAM | Admitting: INTERNAL MEDICINE
Payer: MEDICARE

## 2025-05-25 ENCOUNTER — APPOINTMENT (OUTPATIENT)
Dept: CARDIOLOGY | Facility: HOSPITAL | Age: OVER 89
End: 2025-05-25
Payer: MEDICARE

## 2025-05-25 ENCOUNTER — APPOINTMENT (OUTPATIENT)
Dept: OTHER | Facility: HOSPITAL | Age: OVER 89
End: 2025-05-25
Payer: MEDICARE

## 2025-05-25 ENCOUNTER — APPOINTMENT (OUTPATIENT)
Dept: CT IMAGING | Facility: HOSPITAL | Age: OVER 89
End: 2025-05-25
Payer: MEDICARE

## 2025-05-25 DIAGNOSIS — R79.89 ELEVATED TROPONIN: ICD-10-CM

## 2025-05-25 DIAGNOSIS — I25.10 CORONARY ARTERY DISEASE INVOLVING NATIVE CORONARY ARTERY OF NATIVE HEART WITHOUT ANGINA PECTORIS: Primary | ICD-10-CM

## 2025-05-25 DIAGNOSIS — I48.0 PAROXYSMAL ATRIAL FIBRILLATION WITH RAPID VENTRICULAR RESPONSE: ICD-10-CM

## 2025-05-25 PROBLEM — J18.9 PNEUMONIA DUE TO INFECTIOUS ORGANISM: Status: RESOLVED | Noted: 2024-01-07 | Resolved: 2025-05-25

## 2025-05-25 PROBLEM — I47.10 PAROXYSMAL SVT (SUPRAVENTRICULAR TACHYCARDIA): Chronic | Status: ACTIVE | Noted: 2024-01-07

## 2025-05-25 PROBLEM — M19.90 OSTEOARTHRITIS: Chronic | Status: ACTIVE | Noted: 2021-11-30

## 2025-05-25 PROBLEM — I47.10 SUSTAINED SVT: Status: RESOLVED | Noted: 2023-10-11 | Resolved: 2025-05-25

## 2025-05-25 PROBLEM — S42.213A CLOSED FRACTURE OF SURGICAL NECK OF HUMERUS: Status: RESOLVED | Noted: 2022-02-21 | Resolved: 2025-05-25

## 2025-05-25 PROBLEM — S42.412A CLOSED SUPRACONDYLAR FRACTURE OF LEFT HUMERUS: Status: RESOLVED | Noted: 2021-11-30 | Resolved: 2025-05-25

## 2025-05-25 PROBLEM — E78.2 MIXED HYPERLIPIDEMIA: Chronic | Status: ACTIVE | Noted: 2021-11-30

## 2025-05-25 PROBLEM — Z01.810 PREOP CARDIOVASCULAR EXAM: Status: RESOLVED | Noted: 2021-11-30 | Resolved: 2025-05-25

## 2025-05-25 PROBLEM — I10 BENIGN ESSENTIAL HTN: Chronic | Status: ACTIVE | Noted: 2021-11-30

## 2025-05-25 PROBLEM — I48.91 A-FIB: Status: ACTIVE | Noted: 2025-05-25

## 2025-05-25 PROBLEM — R94.31 ABNORMAL EKG: Status: RESOLVED | Noted: 2022-02-21 | Resolved: 2025-05-25

## 2025-05-25 LAB
AMPHET+METHAMPHET UR QL: NEGATIVE
AMPHETAMINES UR QL: NEGATIVE
ANION GAP SERPL CALCULATED.3IONS-SCNC: 10.2 MMOL/L (ref 5–15)
AORTIC DIMENSIONLESS INDEX: 0.51 (DI)
AV MEAN PRESS GRAD SYS DOP V1V2: 4.1 MMHG
AV VMAX SYS DOP: 132.6 CM/SEC
B PARAPERT DNA SPEC QL NAA+PROBE: NOT DETECTED
B PERT DNA SPEC QL NAA+PROBE: NOT DETECTED
BACTERIA UR QL AUTO: ABNORMAL /HPF
BARBITURATES UR QL SCN: NEGATIVE
BASOPHILS # BLD AUTO: 0.02 10*3/MM3 (ref 0–0.2)
BASOPHILS NFR BLD AUTO: 0.2 % (ref 0–1.5)
BENZODIAZ UR QL SCN: NEGATIVE
BH CV ECHO MEAS - ACS: 1.41 CM
BH CV ECHO MEAS - AI P1/2T: 241.5 MSEC
BH CV ECHO MEAS - AO MAX PG: 7 MMHG
BH CV ECHO MEAS - AO V2 VTI: 25.6 CM
BH CV ECHO MEAS - AVA(I,D): 1.48 CM2
BH CV ECHO MEAS - EDV(CUBED): 55.1 ML
BH CV ECHO MEAS - EDV(MOD-SP2): 46.9 ML
BH CV ECHO MEAS - EDV(MOD-SP4): 74.8 ML
BH CV ECHO MEAS - EF(MOD-SP2): 40.1 %
BH CV ECHO MEAS - EF(MOD-SP4): 45.1 %
BH CV ECHO MEAS - ESV(CUBED): 25.9 ML
BH CV ECHO MEAS - ESV(MOD-SP2): 28.1 ML
BH CV ECHO MEAS - ESV(MOD-SP4): 41.1 ML
BH CV ECHO MEAS - FS: 22.3 %
BH CV ECHO MEAS - IVS/LVPW: 0.95 CM
BH CV ECHO MEAS - IVSD: 0.99 CM
BH CV ECHO MEAS - LA DIMENSION: 4.9 CM
BH CV ECHO MEAS - LAT PEAK E' VEL: 7.8 CM/SEC
BH CV ECHO MEAS - LV DIASTOLIC VOL/BSA (35-75): 46.4 CM2
BH CV ECHO MEAS - LV MASS(C)D: 119.8 GRAMS
BH CV ECHO MEAS - LV MAX PG: 1.69 MMHG
BH CV ECHO MEAS - LV MEAN PG: 0.92 MMHG
BH CV ECHO MEAS - LV SYSTOLIC VOL/BSA (12-30): 25.5 CM2
BH CV ECHO MEAS - LV V1 MAX: 65 CM/SEC
BH CV ECHO MEAS - LV V1 VTI: 13 CM
BH CV ECHO MEAS - LVIDD: 3.8 CM
BH CV ECHO MEAS - LVIDS: 3 CM
BH CV ECHO MEAS - LVOT AREA: 2.9 CM2
BH CV ECHO MEAS - LVOT DIAM: 1.92 CM
BH CV ECHO MEAS - LVPWD: 1.04 CM
BH CV ECHO MEAS - MR MAX PG: 109.4 MMHG
BH CV ECHO MEAS - MR MAX VEL: 523 CM/SEC
BH CV ECHO MEAS - MR MEAN PG: 49.6 MMHG
BH CV ECHO MEAS - MR MEAN VEL: 339.6 CM/SEC
BH CV ECHO MEAS - MR VTI: 139.9 CM
BH CV ECHO MEAS - MV A MAX VEL: 33 CM/SEC
BH CV ECHO MEAS - MV DEC SLOPE: 578 CM/SEC2
BH CV ECHO MEAS - MV DEC TIME: 0.11 SEC
BH CV ECHO MEAS - MV E MAX VEL: 95.9 CM/SEC
BH CV ECHO MEAS - MV E/A: 2.9
BH CV ECHO MEAS - MV MAX PG: 3.8 MMHG
BH CV ECHO MEAS - MV MEAN PG: 1.65 MMHG
BH CV ECHO MEAS - MV P1/2T: 54.9 MSEC
BH CV ECHO MEAS - MV V2 VTI: 20.6 CM
BH CV ECHO MEAS - MVA(P1/2T): 4 CM2
BH CV ECHO MEAS - MVA(VTI): 1.84 CM2
BH CV ECHO MEAS - PA ACC TIME: 0.17 SEC
BH CV ECHO MEAS - PA V2 MAX: 75.6 CM/SEC
BH CV ECHO MEAS - PI END-D VEL: 146.9 CM/SEC
BH CV ECHO MEAS - RAP SYSTOLE: 15 MMHG
BH CV ECHO MEAS - RV MAX PG: 1.56 MMHG
BH CV ECHO MEAS - RV V1 MAX: 62.4 CM/SEC
BH CV ECHO MEAS - RV V1 VTI: 15.4 CM
BH CV ECHO MEAS - RVSP: 52.6 MMHG
BH CV ECHO MEAS - SV(LVOT): 37.9 ML
BH CV ECHO MEAS - SV(MOD-SP2): 18.8 ML
BH CV ECHO MEAS - SV(MOD-SP4): 33.7 ML
BH CV ECHO MEAS - SVI(LVOT): 23.5 ML/M2
BH CV ECHO MEAS - SVI(MOD-SP2): 11.7 ML/M2
BH CV ECHO MEAS - SVI(MOD-SP4): 20.9 ML/M2
BH CV ECHO MEAS - TAPSE (>1.6): 1.36 CM
BH CV ECHO MEAS - TR MAX PG: 37.6 MMHG
BH CV ECHO MEAS - TR MAX VEL: 306.7 CM/SEC
BH CV LOWER VASCULAR LEFT COMMON FEMORAL AUGMENT: NORMAL
BH CV LOWER VASCULAR LEFT COMMON FEMORAL COMPETENT: NORMAL
BH CV LOWER VASCULAR LEFT COMMON FEMORAL COMPRESS: NORMAL
BH CV LOWER VASCULAR LEFT COMMON FEMORAL PHASIC: NORMAL
BH CV LOWER VASCULAR LEFT COMMON FEMORAL SPONT: NORMAL
BH CV LOWER VASCULAR LEFT DISTAL FEMORAL COMPRESS: NORMAL
BH CV LOWER VASCULAR LEFT GASTRONEMIUS COMPRESS: NORMAL
BH CV LOWER VASCULAR LEFT GREATER SAPH BK COMPRESS: NORMAL
BH CV LOWER VASCULAR LEFT LESSER SAPH COMPRESS: NORMAL
BH CV LOWER VASCULAR LEFT MID FEMORAL AUGMENT: NORMAL
BH CV LOWER VASCULAR LEFT MID FEMORAL COMPETENT: NORMAL
BH CV LOWER VASCULAR LEFT MID FEMORAL COMPRESS: NORMAL
BH CV LOWER VASCULAR LEFT MID FEMORAL PHASIC: NORMAL
BH CV LOWER VASCULAR LEFT MID FEMORAL SPONT: NORMAL
BH CV LOWER VASCULAR LEFT PERONEAL COMPRESS: NORMAL
BH CV LOWER VASCULAR LEFT POPLITEAL AUGMENT: NORMAL
BH CV LOWER VASCULAR LEFT POPLITEAL COMPETENT: NORMAL
BH CV LOWER VASCULAR LEFT POPLITEAL COMPRESS: NORMAL
BH CV LOWER VASCULAR LEFT POPLITEAL PHASIC: NORMAL
BH CV LOWER VASCULAR LEFT POPLITEAL SPONT: NORMAL
BH CV LOWER VASCULAR LEFT POSTERIOR TIBIAL COMPRESS: NORMAL
BH CV LOWER VASCULAR LEFT PROXIMAL FEMORAL COMPRESS: NORMAL
BH CV LOWER VASCULAR LEFT SAPHENOFEMORAL JUNCTION COMPRESS: NORMAL
BH CV LOWER VASCULAR RIGHT COMMON FEMORAL AUGMENT: NORMAL
BH CV LOWER VASCULAR RIGHT COMMON FEMORAL COMPETENT: NORMAL
BH CV LOWER VASCULAR RIGHT COMMON FEMORAL COMPRESS: NORMAL
BH CV LOWER VASCULAR RIGHT COMMON FEMORAL PHASIC: NORMAL
BH CV LOWER VASCULAR RIGHT COMMON FEMORAL SPONT: NORMAL
BH CV LOWER VASCULAR RIGHT DISTAL FEMORAL COMPRESS: NORMAL
BH CV LOWER VASCULAR RIGHT GASTRONEMIUS COMPRESS: NORMAL
BH CV LOWER VASCULAR RIGHT GREATER SAPH AK COMPRESS: NORMAL
BH CV LOWER VASCULAR RIGHT GREATER SAPH BK COMPRESS: NORMAL
BH CV LOWER VASCULAR RIGHT LESSER SAPH COMPRESS: NORMAL
BH CV LOWER VASCULAR RIGHT MID FEMORAL AUGMENT: NORMAL
BH CV LOWER VASCULAR RIGHT MID FEMORAL COMPETENT: NORMAL
BH CV LOWER VASCULAR RIGHT MID FEMORAL COMPRESS: NORMAL
BH CV LOWER VASCULAR RIGHT MID FEMORAL PHASIC: NORMAL
BH CV LOWER VASCULAR RIGHT MID FEMORAL SPONT: NORMAL
BH CV LOWER VASCULAR RIGHT PERONEAL COMPRESS: NORMAL
BH CV LOWER VASCULAR RIGHT POPLITEAL AUGMENT: NORMAL
BH CV LOWER VASCULAR RIGHT POPLITEAL COMPETENT: NORMAL
BH CV LOWER VASCULAR RIGHT POPLITEAL COMPRESS: NORMAL
BH CV LOWER VASCULAR RIGHT POPLITEAL PHASIC: NORMAL
BH CV LOWER VASCULAR RIGHT POPLITEAL SPONT: NORMAL
BH CV LOWER VASCULAR RIGHT POSTERIOR TIBIAL COMPRESS: NORMAL
BH CV LOWER VASCULAR RIGHT PROXIMAL FEMORAL COMPRESS: NORMAL
BH CV LOWER VASCULAR RIGHT SAPHENOFEMORAL JUNCTION COMPRESS: NORMAL
BH CV XLRA - RV BASE: 4.1 CM
BH CV XLRA - RV LENGTH: 7.5 CM
BH CV XLRA - RV MID: 3.7 CM
BILIRUB UR QL STRIP: NEGATIVE
BUN SERPL-MCNC: 21 MG/DL (ref 8–23)
BUN/CREAT SERPL: 16.3 (ref 7–25)
BUPRENORPHINE SERPL-MCNC: NEGATIVE NG/ML
C PNEUM DNA NPH QL NAA+NON-PROBE: NOT DETECTED
CALCIUM SPEC-SCNC: 9 MG/DL (ref 8.2–9.6)
CANNABINOIDS SERPL QL: NEGATIVE
CHLORIDE SERPL-SCNC: 104 MMOL/L (ref 98–107)
CHOLEST SERPL-MCNC: 139 MG/DL (ref 0–200)
CLARITY UR: CLEAR
CO2 SERPL-SCNC: 24.8 MMOL/L (ref 22–29)
COCAINE UR QL: NEGATIVE
COLOR UR: YELLOW
CREAT SERPL-MCNC: 1.29 MG/DL (ref 0.57–1)
D DIMER PPP FEU-MCNC: 14.68 MCGFEU/ML (ref 0–0.91)
DEPRECATED RDW RBC AUTO: 45.4 FL (ref 37–54)
EGFRCR SERPLBLD CKD-EPI 2021: 39.3 ML/MIN/1.73
EOSINOPHIL # BLD AUTO: 0.05 10*3/MM3 (ref 0–0.4)
EOSINOPHIL NFR BLD AUTO: 0.6 % (ref 0.3–6.2)
ERYTHROCYTE [DISTWIDTH] IN BLOOD BY AUTOMATED COUNT: 13.2 % (ref 12.3–15.4)
FLUAV SUBTYP SPEC NAA+PROBE: NOT DETECTED
FLUBV RNA ISLT QL NAA+PROBE: NOT DETECTED
GEN 5 1HR TROPONIN T REFLEX: 122 NG/L
GLUCOSE SERPL-MCNC: 128 MG/DL (ref 65–99)
GLUCOSE UR STRIP-MCNC: NEGATIVE MG/DL
HADV DNA SPEC NAA+PROBE: NOT DETECTED
HBA1C MFR BLD: 6.24 % (ref 4.8–5.6)
HCOV 229E RNA SPEC QL NAA+PROBE: NOT DETECTED
HCOV HKU1 RNA SPEC QL NAA+PROBE: NOT DETECTED
HCOV NL63 RNA SPEC QL NAA+PROBE: NOT DETECTED
HCOV OC43 RNA SPEC QL NAA+PROBE: NOT DETECTED
HCT VFR BLD AUTO: 33.2 % (ref 34–46.6)
HDLC SERPL-MCNC: 45 MG/DL (ref 40–60)
HGB BLD-MCNC: 10.6 G/DL (ref 12–15.9)
HGB UR QL STRIP.AUTO: ABNORMAL
HMPV RNA NPH QL NAA+NON-PROBE: NOT DETECTED
HPIV1 RNA ISLT QL NAA+PROBE: NOT DETECTED
HPIV2 RNA SPEC QL NAA+PROBE: NOT DETECTED
HPIV3 RNA NPH QL NAA+PROBE: NOT DETECTED
HPIV4 P GENE NPH QL NAA+PROBE: NOT DETECTED
HYALINE CASTS UR QL AUTO: ABNORMAL /LPF
IMM GRANULOCYTES # BLD AUTO: 0.03 10*3/MM3 (ref 0–0.05)
IMM GRANULOCYTES NFR BLD AUTO: 0.4 % (ref 0–0.5)
INR PPP: 1.21 (ref 0.9–1.1)
KETONES UR QL STRIP: ABNORMAL
LDLC SERPL CALC-MCNC: 81 MG/DL (ref 0–100)
LDLC/HDLC SERPL: 1.82 {RATIO}
LEFT ATRIUM VOLUME INDEX: 52.2 ML/M2
LEUKOCYTE ESTERASE UR QL STRIP.AUTO: ABNORMAL
LV EF BIPLANE MOD: 42.9 %
LYMPHOCYTES # BLD AUTO: 1.55 10*3/MM3 (ref 0.7–3.1)
LYMPHOCYTES NFR BLD AUTO: 18.1 % (ref 19.6–45.3)
M PNEUMO IGG SER IA-ACNC: NOT DETECTED
MAGNESIUM SERPL-MCNC: 1.8 MG/DL (ref 1.7–2.3)
MCH RBC QN AUTO: 30.4 PG (ref 26.6–33)
MCHC RBC AUTO-ENTMCNC: 31.9 G/DL (ref 31.5–35.7)
MCV RBC AUTO: 95.1 FL (ref 79–97)
METHADONE UR QL SCN: NEGATIVE
MONOCYTES # BLD AUTO: 0.35 10*3/MM3 (ref 0.1–0.9)
MONOCYTES NFR BLD AUTO: 4.1 % (ref 5–12)
NEUTROPHILS NFR BLD AUTO: 6.57 10*3/MM3 (ref 1.7–7)
NEUTROPHILS NFR BLD AUTO: 76.6 % (ref 42.7–76)
NITRITE UR QL STRIP: NEGATIVE
NRBC BLD AUTO-RTO: 0 /100 WBC (ref 0–0.2)
NT-PROBNP SERPL-MCNC: 3092 PG/ML (ref 0–1800)
OPIATES UR QL: NEGATIVE
OXYCODONE UR QL SCN: NEGATIVE
PCP UR QL SCN: NEGATIVE
PH UR STRIP.AUTO: <=5 [PH] (ref 5–8)
PHOSPHATE SERPL-MCNC: 3.6 MG/DL (ref 2.5–4.5)
PLATELET # BLD AUTO: 157 10*3/MM3 (ref 140–450)
PMV BLD AUTO: 12 FL (ref 6–12)
POTASSIUM SERPL-SCNC: 4.6 MMOL/L (ref 3.5–5.2)
PROT UR QL STRIP: ABNORMAL
PROTHROMBIN TIME: 15.2 SECONDS (ref 11.7–14.2)
QT INTERVAL: 347 MS
QTC INTERVAL: 508 MS
RBC # BLD AUTO: 3.49 10*6/MM3 (ref 3.77–5.28)
RBC # UR STRIP: ABNORMAL /HPF
REF LAB TEST METHOD: ABNORMAL
RHINOVIRUS RNA SPEC NAA+PROBE: NOT DETECTED
RSV RNA NPH QL NAA+NON-PROBE: NOT DETECTED
SARS-COV-2 RNA RESP QL NAA+PROBE: NOT DETECTED
SINUS: 2.9 CM
SODIUM SERPL-SCNC: 139 MMOL/L (ref 136–145)
SP GR UR STRIP: 1.02 (ref 1–1.03)
SQUAMOUS #/AREA URNS HPF: ABNORMAL /HPF
STJ: 2.4 CM
T4 FREE SERPL-MCNC: 1.06 NG/DL (ref 0.92–1.68)
TRICYCLICS UR QL SCN: NEGATIVE
TRIGL SERPL-MCNC: 61 MG/DL (ref 0–150)
TROPONIN T % DELTA: 61
TROPONIN T NUMERIC DELTA: 46 NG/L
TROPONIN T SERPL HS-MCNC: 394 NG/L
TROPONIN T SERPL HS-MCNC: 76 NG/L
TSH SERPL DL<=0.05 MIU/L-ACNC: 4.39 UIU/ML (ref 0.27–4.2)
UROBILINOGEN UR QL STRIP: ABNORMAL
VLDLC SERPL-MCNC: 13 MG/DL (ref 5–40)
WBC # UR STRIP: ABNORMAL /HPF
WBC NRBC COR # BLD AUTO: 8.57 10*3/MM3 (ref 3.4–10.8)

## 2025-05-25 PROCEDURE — 85025 COMPLETE CBC W/AUTO DIFF WBC: CPT

## 2025-05-25 PROCEDURE — 93010 ELECTROCARDIOGRAM REPORT: CPT | Performed by: INTERNAL MEDICINE

## 2025-05-25 PROCEDURE — 93306 TTE W/DOPPLER COMPLETE: CPT | Performed by: INTERNAL MEDICINE

## 2025-05-25 PROCEDURE — 25510000001 IOPAMIDOL PER 1 ML: Performed by: STUDENT IN AN ORGANIZED HEALTH CARE EDUCATION/TRAINING PROGRAM

## 2025-05-25 PROCEDURE — 83880 ASSAY OF NATRIURETIC PEPTIDE: CPT

## 2025-05-25 PROCEDURE — 99214 OFFICE O/P EST MOD 30 MIN: CPT | Performed by: INTERNAL MEDICINE

## 2025-05-25 PROCEDURE — 93005 ELECTROCARDIOGRAM TRACING: CPT

## 2025-05-25 PROCEDURE — G0378 HOSPITAL OBSERVATION PER HR: HCPCS

## 2025-05-25 PROCEDURE — 83036 HEMOGLOBIN GLYCOSYLATED A1C: CPT

## 2025-05-25 PROCEDURE — 84439 ASSAY OF FREE THYROXINE: CPT

## 2025-05-25 PROCEDURE — 84484 ASSAY OF TROPONIN QUANT: CPT

## 2025-05-25 PROCEDURE — 81001 URINALYSIS AUTO W/SCOPE: CPT

## 2025-05-25 PROCEDURE — 84443 ASSAY THYROID STIM HORMONE: CPT

## 2025-05-25 PROCEDURE — 85610 PROTHROMBIN TIME: CPT

## 2025-05-25 PROCEDURE — 25010000002 DIGOXIN PER 500 MCG: Performed by: NURSE PRACTITIONER

## 2025-05-25 PROCEDURE — 25010000002 MAGNESIUM SULFATE 2 GM/50ML SOLUTION: Performed by: STUDENT IN AN ORGANIZED HEALTH CARE EDUCATION/TRAINING PROGRAM

## 2025-05-25 PROCEDURE — 80306 DRUG TEST PRSMV INSTRMNT: CPT

## 2025-05-25 PROCEDURE — 25010000002 CEFTRIAXONE PER 250 MG

## 2025-05-25 PROCEDURE — 84484 ASSAY OF TROPONIN QUANT: CPT | Performed by: INTERNAL MEDICINE

## 2025-05-25 PROCEDURE — 71275 CT ANGIOGRAPHY CHEST: CPT

## 2025-05-25 PROCEDURE — 85379 FIBRIN DEGRADATION QUANT: CPT

## 2025-05-25 PROCEDURE — 80048 BASIC METABOLIC PNL TOTAL CA: CPT

## 2025-05-25 PROCEDURE — 93306 TTE W/DOPPLER COMPLETE: CPT

## 2025-05-25 PROCEDURE — 84100 ASSAY OF PHOSPHORUS: CPT

## 2025-05-25 PROCEDURE — 93970 EXTREMITY STUDY: CPT | Performed by: STUDENT IN AN ORGANIZED HEALTH CARE EDUCATION/TRAINING PROGRAM

## 2025-05-25 PROCEDURE — 0202U NFCT DS 22 TRGT SARS-COV-2: CPT

## 2025-05-25 PROCEDURE — 87086 URINE CULTURE/COLONY COUNT: CPT

## 2025-05-25 PROCEDURE — 80061 LIPID PANEL: CPT

## 2025-05-25 PROCEDURE — 93970 EXTREMITY STUDY: CPT

## 2025-05-25 PROCEDURE — 83735 ASSAY OF MAGNESIUM: CPT

## 2025-05-25 RX ORDER — TIMOLOL MALEATE 5 MG/ML
1 SOLUTION/ DROPS OPHTHALMIC DAILY
Status: DISCONTINUED | OUTPATIENT
Start: 2025-05-26 | End: 2025-05-29 | Stop reason: HOSPADM

## 2025-05-25 RX ORDER — DILTIAZEM HCL/D5W 125 MG/125
5-15 PLASTIC BAG, INJECTION (ML) INTRAVENOUS
Status: DISCONTINUED | OUTPATIENT
Start: 2025-05-25 | End: 2025-05-26

## 2025-05-25 RX ORDER — LATANOPROST 50 UG/ML
1 SOLUTION/ DROPS OPHTHALMIC NIGHTLY
Status: DISCONTINUED | OUTPATIENT
Start: 2025-05-25 | End: 2025-05-26

## 2025-05-25 RX ORDER — POLYETHYLENE GLYCOL 3350 17 G/17G
17 POWDER, FOR SOLUTION ORAL DAILY PRN
Status: DISCONTINUED | OUTPATIENT
Start: 2025-05-25 | End: 2025-05-29 | Stop reason: HOSPADM

## 2025-05-25 RX ORDER — SODIUM CHLORIDE 9 MG/ML
50 INJECTION, SOLUTION INTRAVENOUS CONTINUOUS
Status: DISCONTINUED | OUTPATIENT
Start: 2025-05-25 | End: 2025-05-25

## 2025-05-25 RX ORDER — IOPAMIDOL 755 MG/ML
100 INJECTION, SOLUTION INTRAVASCULAR
Status: COMPLETED | OUTPATIENT
Start: 2025-05-25 | End: 2025-05-25

## 2025-05-25 RX ORDER — AMOXICILLIN 250 MG
2 CAPSULE ORAL 2 TIMES DAILY PRN
Status: DISCONTINUED | OUTPATIENT
Start: 2025-05-25 | End: 2025-05-29 | Stop reason: HOSPADM

## 2025-05-25 RX ORDER — ASPIRIN 81 MG/1
81 TABLET ORAL DAILY
Status: DISCONTINUED | OUTPATIENT
Start: 2025-05-25 | End: 2025-05-29 | Stop reason: HOSPADM

## 2025-05-25 RX ORDER — BISACODYL 5 MG/1
5 TABLET, DELAYED RELEASE ORAL DAILY PRN
Status: DISCONTINUED | OUTPATIENT
Start: 2025-05-25 | End: 2025-05-29 | Stop reason: HOSPADM

## 2025-05-25 RX ORDER — MAGNESIUM SULFATE HEPTAHYDRATE 40 MG/ML
2 INJECTION, SOLUTION INTRAVENOUS ONCE
Status: COMPLETED | OUTPATIENT
Start: 2025-05-25 | End: 2025-05-25

## 2025-05-25 RX ORDER — BISACODYL 10 MG
10 SUPPOSITORY, RECTAL RECTAL DAILY PRN
Status: DISCONTINUED | OUTPATIENT
Start: 2025-05-25 | End: 2025-05-29 | Stop reason: HOSPADM

## 2025-05-25 RX ORDER — HYDROCHLOROTHIAZIDE 25 MG/1
25 TABLET ORAL DAILY
Status: DISCONTINUED | OUTPATIENT
Start: 2025-05-25 | End: 2025-05-28

## 2025-05-25 RX ORDER — ENOXAPARIN SODIUM 100 MG/ML
1 INJECTION SUBCUTANEOUS EVERY 24 HOURS
Status: DISCONTINUED | OUTPATIENT
Start: 2025-05-26 | End: 2025-05-27

## 2025-05-25 RX ORDER — TIMOLOL MALEATE 5 MG/ML
1 SOLUTION/ DROPS OPHTHALMIC EVERY 12 HOURS SCHEDULED
Status: DISCONTINUED | OUTPATIENT
Start: 2025-05-25 | End: 2025-05-25

## 2025-05-25 RX ORDER — METOPROLOL SUCCINATE 25 MG/1
25 TABLET, EXTENDED RELEASE ORAL 2 TIMES DAILY
Status: DISCONTINUED | OUTPATIENT
Start: 2025-05-25 | End: 2025-05-27

## 2025-05-25 RX ORDER — ATORVASTATIN CALCIUM 40 MG/1
40 TABLET, FILM COATED ORAL DAILY
Status: DISCONTINUED | OUTPATIENT
Start: 2025-05-25 | End: 2025-05-29 | Stop reason: HOSPADM

## 2025-05-25 RX ORDER — HYDROXYZINE HYDROCHLORIDE 10 MG/1
10 TABLET, FILM COATED ORAL ONCE
Status: COMPLETED | OUTPATIENT
Start: 2025-05-25 | End: 2025-05-25

## 2025-05-25 RX ORDER — NITROGLYCERIN 0.4 MG/1
0.4 TABLET SUBLINGUAL
Status: DISCONTINUED | OUTPATIENT
Start: 2025-05-25 | End: 2025-05-29 | Stop reason: HOSPADM

## 2025-05-25 RX ORDER — DIGOXIN 0.25 MG/ML
250 INJECTION INTRAMUSCULAR; INTRAVENOUS ONCE
Status: COMPLETED | OUTPATIENT
Start: 2025-05-25 | End: 2025-05-25

## 2025-05-25 RX ORDER — LISINOPRIL 20 MG/1
20 TABLET ORAL 2 TIMES DAILY
Status: DISCONTINUED | OUTPATIENT
Start: 2025-05-25 | End: 2025-05-28

## 2025-05-25 RX ORDER — ACETYLCYSTEINE 200 MG/ML
600 SOLUTION ORAL; RESPIRATORY (INHALATION) EVERY 12 HOURS SCHEDULED
Status: DISPENSED | OUTPATIENT
Start: 2025-05-25 | End: 2025-05-27

## 2025-05-25 RX ADMIN — ASPIRIN 81 MG: 81 TABLET, COATED ORAL at 10:22

## 2025-05-25 RX ADMIN — ATORVASTATIN CALCIUM 40 MG: 40 TABLET, FILM COATED ORAL at 10:22

## 2025-05-25 RX ADMIN — CEFTRIAXONE SODIUM 1000 MG: 1 INJECTION, POWDER, FOR SOLUTION INTRAMUSCULAR; INTRAVENOUS at 14:08

## 2025-05-25 RX ADMIN — ACETYLCYSTEINE 600 MG: 200 SOLUTION ORAL; RESPIRATORY (INHALATION) at 13:58

## 2025-05-25 RX ADMIN — METOPROLOL SUCCINATE 25 MG: 25 TABLET, EXTENDED RELEASE ORAL at 10:22

## 2025-05-25 RX ADMIN — DIGOXIN 250 MCG: 0.25 INJECTION INTRAMUSCULAR; INTRAVENOUS at 12:23

## 2025-05-25 RX ADMIN — ACETYLCYSTEINE 600 MG: 200 SOLUTION ORAL; RESPIRATORY (INHALATION) at 21:01

## 2025-05-25 RX ADMIN — Medication 5 MG/HR: at 14:05

## 2025-05-25 RX ADMIN — METOPROLOL SUCCINATE 25 MG: 25 TABLET, EXTENDED RELEASE ORAL at 20:31

## 2025-05-25 RX ADMIN — HYDROXYZINE HYDROCHLORIDE 10 MG: 10 TABLET ORAL at 14:05

## 2025-05-25 RX ADMIN — MAGNESIUM SULFATE HEPTAHYDRATE 2 G: 40 INJECTION, SOLUTION INTRAVENOUS at 11:11

## 2025-05-25 RX ADMIN — IOPAMIDOL 100 ML: 755 INJECTION, SOLUTION INTRAVENOUS at 15:44

## 2025-05-25 RX ADMIN — LATANOPROST 1 DROP: 50 SOLUTION/ DROPS OPHTHALMIC at 20:32

## 2025-05-25 NOTE — CONSULTS
Referring Provider: Lino Paulino MD    Reason for Consultation:  atrial fibrillation with rapid ventricular response       Patient Care Team:  Ranulfo Mims MD as PCP - General (Internal Medicine)  eDep Aceves MD as Consulting Physician (Cardiology)      SUBJECTIVE     Chief Complaint:  chest pressure, palpitations     History of present illness:  Amanda Brown is a 89 y.o. female patient of Dr. Aceves with a history of SVT status post successful cardioversion, coronary artery disease status post CABG, hypertension and hyperlipidemia who presented to the ER at [facility] complaining of chest pressure and palpitations. Workup in the ER revealed atrial fibrillation with rapid ventricular response. She was reportedly given Lovenox and Cardizem bolus. She became hypotensive after receiving Cardizem so she was given IV fluid bolus. Her blood pressure improved and she was admitted to [facility] for further evaluation and treatment. Cardiology was consulted upon patient arrival.       Review of systems:    Constitutional: No weakness, fatigue, fever, rigors, chills   Eyes: No vision changes, eye pain   ENT/oropharynx: No difficulty swallowing, sore throat, epistaxis, changes in hearing   Cardiovascular: + chest pain, chest tightness, palpitations, lightheadedness.  No paroxysmal nocturnal dyspnea, orthopnea, diaphoresis,  syncopal episode   Respiratory: No shortness of breath, dyspnea on exertion, cough, wheezing, hemoptysis   Gastrointestinal: No abdominal pain, nausea, vomiting, diarrhea, bloody stools   Genitourinary: No hematuria, dysuria   Neurological: No headache, tremors, numbness, one-sided weakness    Musculoskeletal: No cramps, myalgias, joint pain, joint swelling   Integument: No rash, edema        Personal History:      Past Medical History:   Diagnosis Date    Benign essential HTN 11/30/2021    Closed fracture of surgical neck of humerus 02/21/2022    Closed  supracondylar fracture of left humerus 11/30/2021    Coronary artery disease involving native coronary artery of native heart without angina pectoris 03/27/2016    Fracture, humerus 2021    left    GERD (gastroesophageal reflux disease)     Mixed hyperlipidemia 09/27/2015    Osteoarthritis 11/30/2021    Paroxysmal SVT (supraventricular tachycardia) 01/07/2024    Pneumonia due to infectious organism 01/07/2024    PONV (postoperative nausea and vomiting)        Past Surgical History:   Procedure Laterality Date    ARTERIAL BYPASS SURGERY      CARDIAC CATHETERIZATION      CARDIAC SURGERY  2009    CABG 4V    CORONARY ARTERY BYPASS GRAFT         Family History   Problem Relation Age of Onset    Lymphoma Mother     Stroke Mother     Asthma Father        Social History     Tobacco Use    Smoking status: Never     Passive exposure: Never    Smokeless tobacco: Never   Vaping Use    Vaping status: Never Used   Substance Use Topics    Alcohol use: Never    Drug use: Never        Home meds:  Prior to Admission medications    Medication Sig Start Date End Date Taking? Authorizing Provider   aspirin 81 MG EC tablet Take 1 tablet by mouth Daily. LD 11/29   Yes Edis Hsu MD   atorvastatin (LIPITOR) 40 MG tablet Take 1 tablet by mouth Daily. 9/28/23  Yes Edis Hsu MD   B Complex Vitamins (VITAMIN B COMPLEX PO) Take  by mouth 2 (Two) Times a Week.   Yes Edis Hsu MD   hydroCHLOROthiazide (HYDRODIURIL) 25 MG tablet Take 1 tablet by mouth Daily. 7/14/22  Yes Edis Hsu MD   lisinopril (PRINIVIL,ZESTRIL) 20 MG tablet Take 1 tablet by mouth 2 (Two) Times a Day.   Yes Edis Hsu MD Lumigan 0.01 % ophthalmic drops Administer 1 drop to both eyes Every Night. 7/8/24  Yes Edis Hsu MD   meclizine (ANTIVERT) 25 MG tablet Take 1 tablet by mouth Daily. 3/21/25  Yes Edis Hsu MD   metoprolol succinate XL (TOPROL-XL) 25 MG 24 hr tablet Take 1 tablet by mouth 2  "(Two) Times a Day. 5/8/25  Yes Olga Lidia Russell APRN   omeprazole (priLOSEC) 20 MG capsule Take 1 capsule by mouth 4 (Four) Times a Week.   Yes Provider, MD Edis   timolol (TIMOPTIC) 0.5 % ophthalmic solution timolol maleate 0.5 % eye drops   Yes Provider, MD Edis       Allergies:     Patient has no known allergies.    Scheduled Meds:acetylcysteine, 600 mg, Oral, Q12H  aspirin, 81 mg, Oral, Daily  atorvastatin, 40 mg, Oral, Daily  cefTRIAXone, 1,000 mg, Intravenous, Q24H  [START ON 5/26/2025] enoxaparin sodium, 1 mg/kg, Subcutaneous, Q24H  [Held by provider] hydroCHLOROthiazide, 25 mg, Oral, Daily  hydrOXYzine, 10 mg, Oral, Once  [Held by provider] lisinopril, 20 mg, Oral, BID  metoprolol succinate XL, 25 mg, Oral, BID      Continuous Infusions:dilTIAZem, 5-15 mg/hr      PRN Meds:  senna-docusate sodium **AND** polyethylene glycol **AND** bisacodyl **AND** bisacodyl    Calcium Replacement - Follow Nurse / BPA Driven Protocol    Magnesium Cardiology Dose Replacement - Follow Nurse / BPA Driven Protocol    nitroglycerin    Phosphorus Replacement - Follow Nurse / BPA Driven Protocol    Potassium Replacement - Follow Nurse / BPA Driven Protocol      OBJECTIVE    Vital Signs  Vitals:    05/25/25 0840 05/25/25 1223   BP: 130/79 137/83   BP Location:  Right arm   Patient Position:  Lying   Pulse: 118 (!) 124   Resp: 20 18   Temp: 98 °F (36.7 °C)    TempSrc: Oral    SpO2: 98% 97%   Weight: 63 kg (138 lb 14.2 oz)    Height: 154.9 cm (61\")        Flowsheet Rows      Flowsheet Row First Filed Value   Admission Height 154.9 cm (61\") Documented at 05/25/2025 0840   Admission Weight 63 kg (138 lb 14.2 oz) Documented at 05/25/2025 0840              Intake/Output Summary (Last 24 hours) at 5/25/2025 1356  Last data filed at 5/25/2025 1000  Gross per 24 hour   Intake 490 ml   Output --   Net 490 ml        Telemetry:  atrial fibrillation with rapid ventricular response     Physical Exam:  The patient is alert, oriented " and in no distress.  Vital signs as noted above.  Head and neck revealed no carotid bruits or jugular venous distention.  No thyromegaly or lymphadenopathy is present  Lungs clear.  No wheezing.  Breath sounds are normal bilaterally.  On room air.   Heart:  Rhythm irregularly irregular.  Normal first and second heart sounds.    Abdomen: Soft and nontender.  No organomegaly is present.  Extremities with good peripheral pulses without any pedal edema.  Skin: Warm and dry.  Musculoskeletal system is grossly normal.  CNS grossly normal.     Reviewed and updated.    Results Review:  I have personally reviewed the results from the time of this admission to 5/25/2025 13:56 EDT and agree with these findings:  [x]  Laboratory  []  Microbiology  [x]  Radiology  [x]  EKG/Telemetry   [x]  Cardiology/Vascular   []  Pathology  [x]  Old records  []  Other:    Most notable findings include:     Lab Results (last 24 hours)       Procedure Component Value Units Date/Time    Urine Culture - Urine, Urine, Clean Catch [[id number]] Collected: 05/25/25 1122    Specimen: Urine, Clean Catch Updated: 05/25/25 1320    Urine Drug Screen - Urine, Clean Catch [[id number]]  (Normal) Collected: 05/25/25 1122    Specimen: Urine, Clean Catch Updated: 05/25/25 1153     THC, Screen, Urine Negative     Phencyclidine (PCP), Urine Negative     Cocaine Screen, Urine Negative     Methamphetamine, Ur Negative     Opiate Screen Negative     Amphetamine Screen, Urine Negative     Benzodiazepine Screen, Urine Negative     Tricyclic Antidepressants Screen Negative     Methadone Screen, Urine Negative     Barbiturates Screen, Urine Negative     Oxycodone Screen, Urine Negative     Buprenorphine, Screen, Urine Negative    Narrative:      Cutoff For Drugs Screened:    Amphetamines               500 ng/ml  Barbiturates               200 ng/ml  Benzodiazepines            150 ng/ml  Cocaine                    150 ng/ml  Methadone                  200 ng/ml  Opiates                     100 ng/ml  Phencyclidine               25 ng/ml  THC                         50 ng/ml  Methamphetamine            500 ng/ml  Tricyclic Antidepressants  300 ng/ml  Oxycodone                  100 ng/ml  Buprenorphine               10 ng/ml    The normal value for all drugs tested is negative. This report includes unconfirmed screening results, with the cutoff values listed, to be used for medical treatment purposes only.  Unconfirmed results must not be used for non-medical purposes such as employment or legal testing.  Clinical consideration should be applied to any drug of abuse test, particularly when unconfirmed results are used.    All urine drugs of abuse requests without chain of custody are for medical screening purposes only.  False positives are possible.      Urinalysis With Microscopic If Indicated (No Culture) - Urine, Clean Catch [[id number]]  (Abnormal) Collected: 05/25/25 1122    Specimen: Urine, Clean Catch Updated: 05/25/25 1146     Color, UA Yellow     Appearance, UA Clear     pH, UA <=5.0     Specific Gravity, UA 1.023     Glucose, UA Negative     Ketones, UA Trace     Bilirubin, UA Negative     Blood, UA Small (1+)     Protein, UA Trace     Leuk Esterase, UA Small (1+)     Nitrite, UA Negative     Urobilinogen, UA 1.0 E.U./dL    Urinalysis, Microscopic Only - Urine, Clean Catch [[id number]]  (Abnormal) Collected: 05/25/25 1122    Specimen: Urine, Clean Catch Updated: 05/25/25 1146     RBC, UA 6-10 /HPF      WBC, UA 3-5 /HPF      Bacteria, UA Trace /HPF      Squamous Epithelial Cells, UA 0-2 /HPF      Hyaline Casts, UA 3-6 /LPF      Methodology Automated Microscopy    High Sensitivity Troponin T 1Hr [[id number]]  (Abnormal) Collected: 05/25/25 1104    Specimen: Blood from Arm, Right Updated: 05/25/25 1138     HS Troponin T 122 ng/L      Troponin T Numeric Delta 46 ng/L      Troponin T % Delta 61    Narrative:      High Sensitive Troponin T Reference Range:  <14.0 ng/L- Negative Female  for AMI  <22.0 ng/L- Negative Male for AMI  >=14 - Abnormal Female indicating possible myocardial injury.  >=22 - Abnormal Male indicating possible myocardial injury.   Clinicians would have to utilize clinical acumen, EKG, Troponin, and serial changes to determine if it is an Acute Myocardial Infarction or myocardial injury due to an underlying chronic condition.         T4, Free [[id number]]  (Normal) Collected: 05/25/25 0947    Specimen: Blood from Arm, Right Updated: 05/25/25 1108     Free T4 1.06 ng/dL     Respiratory Panel PCR w/COVID-19(SARS-CoV-2) SUSAN/JOSUÉ/SELVIN/PAD/COR/DIANE In-House, NP Swab in UTM/VTM, 2 HR TAT - Swab, Nasopharynx [[id number]]  (Normal) Collected: 05/25/25 0947    Specimen: Swab from Nasopharynx Updated: 05/25/25 1051     ADENOVIRUS, PCR Not Detected     Coronavirus 229E Not Detected     Coronavirus HKU1 Not Detected     Coronavirus NL63 Not Detected     Coronavirus OC43 Not Detected     COVID19 Not Detected     Human Metapneumovirus Not Detected     Human Rhinovirus/Enterovirus Not Detected     Influenza A PCR Not Detected     Influenza B PCR Not Detected     Parainfluenza Virus 1 Not Detected     Parainfluenza Virus 2 Not Detected     Parainfluenza Virus 3 Not Detected     Parainfluenza Virus 4 Not Detected     RSV, PCR Not Detected     Bordetella pertussis pcr Not Detected     Bordetella parapertussis PCR Not Detected     Chlamydophila pneumoniae PCR Not Detected     Mycoplasma pneumo by PCR Not Detected    Narrative:      In the setting of a positive respiratory panel with a viral infection PLUS a negative procalcitonin without other underlying concern for bacterial infection, consider observing off antibiotics or discontinuation of antibiotics and continue supportive care. If the respiratory panel is positive for atypical bacterial infection (Bordetella pertussis, Chlamydophila pneumoniae, or Mycoplasma pneumoniae), consider antibiotic de-escalation to target atypical bacterial infection.     High Sensitivity Troponin T [[id number]]  (Abnormal) Collected: 05/25/25 0947    Specimen: Blood from Arm, Right Updated: 05/25/25 1047     HS Troponin T 76 ng/L     Narrative:      High Sensitive Troponin T Reference Range:  <14.0 ng/L- Negative Female for AMI  <22.0 ng/L- Negative Male for AMI  >=14 - Abnormal Female indicating possible myocardial injury.  >=22 - Abnormal Male indicating possible myocardial injury.   Clinicians would have to utilize clinical acumen, EKG, Troponin, and serial changes to determine if it is an Acute Myocardial Infarction or myocardial injury due to an underlying chronic condition.         BNP [[id number]]  (Abnormal) Collected: 05/25/25 0947    Specimen: Blood from Arm, Right Updated: 05/25/25 1040     proBNP 3,092.0 pg/mL     Narrative:      This assay is used as an aid in the diagnosis of individuals suspected of having heart failure. It can be used as an aid in the diagnosis of acute decompensated heart failure (ADHF) in patients presenting with signs and symptoms of ADHF to the emergency department (ED). In addition, NT-proBNP of <300 pg/mL indicates ADHF is not likely.    Age Range Result Interpretation  NT-proBNP Concentration (pg/mL:      <50             Positive            >450                   Gray                 300-450                    Negative             <300    50-75           Positive            >900                  Gray                300-900                  Negative            <300      >75             Positive            >1800                  Gray                300-1800                  Negative            <300    Lipid Panel [[id number]] Collected: 05/25/25 0947    Specimen: Blood from Arm, Right Updated: 05/25/25 1040     Total Cholesterol 139 mg/dL      Triglycerides 61 mg/dL      HDL Cholesterol 45 mg/dL      LDL Cholesterol  81 mg/dL      VLDL Cholesterol 13 mg/dL      LDL/HDL Ratio 1.82    Narrative:      Cholesterol Reference Ranges  (U.S. Department  of Health and Human Services ATP III Classifications)    Desirable          <200 mg/dL  Borderline High    200-239 mg/dL  High Risk          >240 mg/dL      Triglyceride Reference Ranges  (U.S. Department of Health and Human Services ATP III Classifications)    Normal           <150 mg/dL  Borderline High  150-199 mg/dL  High             200-499 mg/dL  Very High        >500 mg/dL    HDL Reference Ranges  (U.S. Department of Health and Human Services ATP III Classifications)    Low     <40 mg/dl (major risk factor for CHD)  High    >60 mg/dl ('negative' risk factor for CHD)        LDL Reference Ranges  (U.S. Department of Health and Human Services ATP III Classifications)    Optimal          <100 mg/dL  Near Optimal     100-129 mg/dL  Borderline High  130-159 mg/dL  High             160-189 mg/dL  Very High        >189 mg/dL    LDL is calculated using the NIH LDL-C calculation.      TSH Rfx On Abnormal To Free T4 [[id number]]  (Abnormal) Collected: 05/25/25 0947    Specimen: Blood from Arm, Right Updated: 05/25/25 1040     TSH 4.390 uIU/mL     Basic Metabolic Panel [[id number]]  (Abnormal) Collected: 05/25/25 0947    Specimen: Blood from Arm, Right Updated: 05/25/25 1040     Glucose 128 mg/dL      BUN 21 mg/dL      Creatinine 1.29 mg/dL      Sodium 139 mmol/L      Potassium 4.6 mmol/L      Chloride 104 mmol/L      CO2 24.8 mmol/L      Calcium 9.0 mg/dL      BUN/Creatinine Ratio 16.3     Anion Gap 10.2 mmol/L      eGFR 39.3 mL/min/1.73     Narrative:      GFR Categories in Chronic Kidney Disease (CKD)              GFR Category          GFR (mL/min/1.73)    Interpretation  G1                    90 or greater        Normal or high (1)  G2                    60-89                Mild decrease (1)  G3a                   45-59                Mild to moderate decrease  G3b                   30-44                Moderate to severe decrease  G4                    15-29                Severe decrease  G5                    14 or  less           Kidney failure    (1)In the absence of evidence of kidney disease, neither GFR category G1 or G2 fulfill the criteria for CKD.    eGFR calculation 2021 CKD-EPI creatinine equation, which does not include race as a factor    Magnesium [[id number]]  (Normal) Collected: 05/25/25 0947    Specimen: Blood from Arm, Right Updated: 05/25/25 1040     Magnesium 1.8 mg/dL     Phosphorus [[id number]]  (Normal) Collected: 05/25/25 0947    Specimen: Blood from Arm, Right Updated: 05/25/25 1040     Phosphorus 3.6 mg/dL     Hemoglobin A1c [[id number]]  (Abnormal) Collected: 05/25/25 0947    Specimen: Blood from Arm, Right Updated: 05/25/25 1033     Hemoglobin A1C 6.24 %     Narrative:      Hemoglobin A1C Ranges:    Increased Risk for Diabetes  5.7% to 6.4%  Diabetes                     >= 6.5%  Diabetic Goal                < 7.0%    Protime-INR [[id number]]  (Abnormal) Collected: 05/25/25 0947    Specimen: Blood from Arm, Right Updated: 05/25/25 1026     Protime 15.2 Seconds      INR 1.21    CBC & Differential [[id number]]  (Abnormal) Collected: 05/25/25 0947    Specimen: Blood from Arm, Right Updated: 05/25/25 1005    Narrative:      The following orders were created for panel order CBC & Differential.  Procedure                               Abnormality         Status                     ---------                               -----------         ------                     CBC Auto Differential[[id number]]        Abnormal            Final result                 Please view results for these tests on the individual orders.    CBC Auto Differential [[id number]]  (Abnormal) Collected: 05/25/25 0947    Specimen: Blood from Arm, Right Updated: 05/25/25 1005     WBC 8.57 10*3/mm3      RBC 3.49 10*6/mm3      Hemoglobin 10.6 g/dL      Hematocrit 33.2 %      MCV 95.1 fL      MCH 30.4 pg      MCHC 31.9 g/dL      RDW 13.2 %      RDW-SD 45.4 fl      MPV 12.0 fL      Platelets 157 10*3/mm3      Neutrophil % 76.6 %      Lymphocyte %  18.1 %      Monocyte % 4.1 %      Eosinophil % 0.6 %      Basophil % 0.2 %      Immature Grans % 0.4 %      Neutrophils, Absolute 6.57 10*3/mm3      Lymphocytes, Absolute 1.55 10*3/mm3      Monocytes, Absolute 0.35 10*3/mm3      Eosinophils, Absolute 0.05 10*3/mm3      Basophils, Absolute 0.02 10*3/mm3      Immature Grans, Absolute 0.03 10*3/mm3      nRBC 0.0 /100 WBC             Imaging Results (Last 24 Hours)       ** No results found for the last 24 hours. **            LAB RESULTS (LAST 7 DAYS)    CBC  Results from last 7 days   Lab Units 05/25/25  0947   WBC 10*3/mm3 8.57   RBC 10*6/mm3 3.49*   HEMOGLOBIN g/dL 10.6*   HEMATOCRIT % 33.2*   MCV fL 95.1   PLATELETS 10*3/mm3 157       BMP  Results from last 7 days   Lab Units 05/25/25  0947   SODIUM mmol/L 139   POTASSIUM mmol/L 4.6   CHLORIDE mmol/L 104   CO2 mmol/L 24.8   BUN mg/dL 21   CREATININE mg/dL 1.29*   GLUCOSE mg/dL 128*   MAGNESIUM mg/dL 1.8   PHOSPHORUS mg/dL 3.6       CMP   Results from last 7 days   Lab Units 05/25/25  0947   SODIUM mmol/L 139   POTASSIUM mmol/L 4.6   CHLORIDE mmol/L 104   CO2 mmol/L 24.8   BUN mg/dL 21   CREATININE mg/dL 1.29*   GLUCOSE mg/dL 128*       BNP        TROPONIN  Results from last 7 days   Lab Units 05/25/25  1104   HSTROP T ng/L 122*       CoAg  Results from last 7 days   Lab Units 05/25/25  0947   INR  1.21*       Creatinine Clearance  Estimated Creatinine Clearance: 24.2 mL/min (A) (by C-G formula based on SCr of 1.29 mg/dL (H)).    ABG          Radiology  No radiology results for the last day      EKG  I personally viewed and interpreted the patient's EKG/Telemetry data:  ECG 12 Lead Rhythm Change   Preliminary Result   HEART PPQQ=605  bpm   RR Zkbhavam=397  ms   [place] Interval=  ms   P Horizontal Axis=  deg   P Front Axis=  deg   QRSD Fdtzxqmp=369  ms   QT Aasbxuik=884  ms   GEiH=295  ms   QRS Axis=47  deg   T Wave Axis=-11  deg   - ABNORMAL ECG -   Atrial fibrillation   Right bundle branch block   Prolonged QT interval    Date and Time of Study:2025-05-25 10:12:00                  Echocardiogram:    Results for orders placed during the hospital encounter of 01/07/24    Adult Transthoracic Echo Complete W/ Cont if Necessary Per Protocol    Interpretation Summary    Left ventricular systolic function is normal. Calculated left ventricular EF = 57% Left ventricular ejection fraction appears to be 56 - 60%.    Left ventricular diastolic function is consistent with age.  GLS -16.7%    There is calcification of the aortic valve with no significant stenosis.    Estimated right ventricular systolic pressure from tricuspid regurgitation is mildly elevated (35-45 mmHg).    Mild mitral and tricuspid valve regurgitation is present.        Stress Test:        Cardiac Catheterization:  No results found for this or any previous visit.        Other:      ASSESSMENT & PLAN:    Principal Problem:    Paroxysmal atrial fibrillation with rapid ventricular response  Active Problems:    Coronary artery disease involving native coronary artery of native heart without angina pectoris    Mixed hyperlipidemia    Benign essential HTN    Paroxysmal SVT (supraventricular tachycardia)      Paroxysmal atrial fibrillation with rapid ventricular response  The patient has known paroxysmal SVT and reports a history of previous cardioversion.  New onset atrial fibrillation.  She was given a Cardizem bolus at [facility], but became hypotensive and required IV fluids bolus.   Restart home dose of Toprol XL 25 mg twice daily.  She was given digoxin 250 mcg IV x 1 dose without much change.  Her blood pressure has improved, but heart rate remains elevated.  She will be started on a Cardizem drip without bolus.  Closely monitor blood pressure.  [id number] score is 5  She will be anticoagulation with Lovenox 1 mg/kg sq daily (renally adjusted).    consulted to check patient's cost for Eliquis 2.5 mg BID  Obtain echocardiogram   Check K, Mg,  TSH    Coronary artery disease involving native coronary artery of native heart without angina pectoris  History of CABG  The patient initially presented to the ER with chest pain, but she now denies any pain.   Serial high sensitivity troponin 76, 122  proBNP 3,092  EKG without acute ischemia  Likely type II MI secondary to rapid a-fib  Trend troponin  Obtain echo  Continue aspirin, high intensity statin and beta blocker     Mixed hyperlipidemia  Continue atorvastatin    Benign essential HTN, chronic  Blood pressure is soft  Continue beta blocker for rate control  Hold lisinopril and HCTZ for now  Closely monitor blood pressure       Further assessment and recommendations per Dr. Caraballo.    Electronically signed by ADEEL Jordan, 05/25/25, 2:07 PM EDT.    The patient was seen around 1 PM today.  Chart was reviewed in entirety.  Reviewed and agree with the assessment and plan as documented by the nurse practitioner Solange Wolf.  Majority of the MDM was performed by me.  Patient has a history of SVT in the past.  Patient had a cardioversion in the past.  Patient has new onset A-fib with RVR.  Patient was started on intravenous Cardizem.  Blood pressure is doing better.  Elevated PCA1XN4-ERCb score at 5.  Patient is on subcu Lovenox.    Patient has history of CABG  Patient is not having any angina pectoris.  Mild elevation of troponin.  Echocardiogram.  Patient would likely benefit from ischemic cardiac workup in view of previous CABG and new onset arrhythmia.  Further plan will depend on patient's progress.    Medications were reviewed and updated  Patient is on aspirin atorvastatin subcu Lovenox metoprolol metoprolol and intravenous Cardizem.    Reviewed and updated 5/25/2025.  Electronically signed by Ramiro Caraballo MD, 05/25/25, 2:45 PM EDT.

## 2025-05-25 NOTE — H&P
"[facility] Medicine Services  History & Physical    Patient Name: Amanda Brown  : 6/10/1933  MRN: [id number]  Primary Care Physician:  Ranulfo Mims MD  Date of admission: 2025  Date and Time of Service: 2025 at 1110    Subjective      Chief Complaint: transfer from OSH for new onset a fib RVR    History of Present Illness: Amanda Brown is a 89 y.o. female with a CMH of HTN, HLD, hx SVT who presented to [facility] on 2025 with  new onset a fib RVR. Patient originally presented to [facility] ER due to complaints of chest pressure, lightheadedness and nausea as well as “feeling heart racing” when using the restroom around 0300 this morning. Patient states she woke up to the chest pressure. Upon arrival to OSH, patient was noted to be in a fib with rate of 120-130. Patient with no know history of a fib, not on AC; however patient does have a history of SVT. Per chart review, patient follows with Dr. Aceves and was recommended to undergo ablation multiple times however patient and family reluctant. Patient reports her daughter (Lizabeth) with whom she lives with has been keeping a close eye on her heart rate and blood pressure at home as they have been following up with cardiology due to blood pressure issues.  Patient daughter at bedside has log of heart rates over the last several days and reports \"when I would put the oximeter on her heart rate would never stay steady\".  Patient family at bedside also reports there has been times over the last several months that patient will \"all of a sudden out of nowhere feel jittery or nauseous and then it will subside relatively soon after\".    At OSH, patient was given IV diltiazem however after receiving this became hypotensive therefore the drip was discontinued. Patient was resuscitated with IVF bolus.  Per ED provider patient was rate controlled in the 80s therefore ED provider alerted patient to travel to this facility " "via private vehicle.  Patient family reports during transport patient \"got a headache and felt head pressure\" however patient states she does not have the sensation anymore.  Patient was noted to be in A-fib with RVR, rate 120s.  D-dimer was elevated at 2.18, troponin 16 repeat was 25.  Patient did receive Zofran and Tylenol at [facility] ED.  Patient was given Lovenox subcu as well.     On arrival to this facility, patient was noted to be in afib RVR with rate 120s on EKG. Labs were ordered and obtained. Results as follows: CBC with hgb 10.6, hct 33.2, PT 15.2, INR 1.21, hgb A1C 6.24%, mag and phos WNL. BMP with cer 1.29, glucose 128. TSH abnormal however free t4 WNL. Lipid panel WNL. BNP elevated at 3092. Initial troponin 76, reflex 122. UA with presence of blood, protein, leuk esterase, RBC, WBC and bacteria. UDS negative. RVP negative. Cardiology consulted. Hospitalist service to admit for further management.         Review of Systems   Constitutional:  Negative for chills, fatigue and fever.   Respiratory:  Negative for shortness of breath.    Cardiovascular:  Positive for palpitations and leg swelling. Negative for chest pain.   Gastrointestinal:  Positive for nausea. Negative for abdominal pain and vomiting.   Neurological:  Positive for weakness and light-headedness. Negative for dizziness and headaches.       Personal History     Past Medical History:   Diagnosis Date    Benign essential HTN 11/30/2021    Closed fracture of surgical neck of humerus 02/21/2022    Closed supracondylar fracture of left humerus 11/30/2021    Coronary artery disease involving native coronary artery of native heart without angina pectoris 03/27/2016    Fracture, humerus 2021    left    GERD (gastroesophageal reflux disease)     Mixed hyperlipidemia 09/27/2015    Osteoarthritis 11/30/2021    Paroxysmal SVT (supraventricular tachycardia) 01/07/2024    Pneumonia due to infectious organism 01/07/2024    PONV (postoperative nausea " and vomiting)        Past Surgical History:   Procedure Laterality Date    ARTERIAL BYPASS SURGERY      CARDIAC CATHETERIZATION      CARDIAC SURGERY  2009    CABG 4V    CORONARY ARTERY BYPASS GRAFT         Family History: family history includes Asthma in her father; Lymphoma in her mother; Stroke in her mother. Otherwise pertinent FHx was reviewed and not pertinent to current issue.    Social History:  reports that she has never smoked. She has never been exposed to tobacco smoke. She has never used smokeless tobacco. She reports that she does not drink alcohol and does not use drugs.    Home Medications:  Prior to Admission Medications       Prescriptions Last Dose Informant Patient Reported? Taking?    aspirin 81 MG EC tablet   Yes No    Take 1 tablet by mouth Daily. LD 11/29    atorvastatin (LIPITOR) 40 MG tablet   Yes No    Take 1 tablet by mouth Daily.    B Complex Vitamins (VITAMIN B COMPLEX PO)   Yes No    Take  by mouth 2 (Two) Times a Week.    hydroCHLOROthiazide (HYDRODIURIL) 25 MG tablet   Yes No    Take 1 tablet by mouth Daily.    lisinopril (PRINIVIL,ZESTRIL) 20 MG tablet   Yes No    Take 1 tablet by mouth 2 (Two) Times a Day.    Lumigan 0.01 % ophthalmic drops   Yes No    Administer 1 drop to both eyes Every Night.    meclizine (ANTIVERT) 25 MG tablet   Yes No    Take 1 tablet by mouth Daily.    metoprolol succinate XL (TOPROL-XL) 25 MG 24 hr tablet   No No    Take 1 tablet by mouth 2 (Two) Times a Day.    omeprazole (priLOSEC) 20 MG capsule   Yes No    Take 1 capsule by mouth 4 (Four) Times a Week.    timolol (TIMOPTIC) 0.5 % ophthalmic solution   Yes No    timolol maleate 0.5 % eye drops              Allergies:  No Known Allergies    Objective      Vitals:   Temp:  [98 °F (36.7 °C)] 98 °F (36.7 °C)  Heart Rate:  [118-124] 124  Resp:  [18-20] 18  BP: (130-137)/(79-83) 137/83  Body mass index is 26.24 kg/m².  Physical Exam  General: 89 yo female, Alert and oriented, well nourished, no acute  distress.  HENT: Normocephalic, normal hearing, moist oral mucosa, no scleral icterus.  Neck: Supple, nontender, no carotid bruits, no JVD, no LAD.  Lungs: Clear to auscultation, nonlabored respiration.  Heart: irregularly irregular, no murmur, gallop or edema.  Abdomen: Soft, nontender, nondistended, + bowel sounds.  Musculoskeletal: Normal range of motion and strength, no tenderness or swelling.  Skin: Skin is warm, dry and pink, no rashes or lesions.  Psychiatric: Cooperative, appropriate mood and affect.      Diagnostic Data:  Lab Results (last 24 hours)       Procedure Component Value Units Date/Time    Urine Culture - Urine, Urine, Clean Catch [[id number]] Collected: 05/25/25 1122    Specimen: Urine, Clean Catch Updated: 05/25/25 1320    Urine Drug Screen - Urine, Clean Catch [[id number]]  (Normal) Collected: 05/25/25 1122    Specimen: Urine, Clean Catch Updated: 05/25/25 1153     THC, Screen, Urine Negative     Phencyclidine (PCP), Urine Negative     Cocaine Screen, Urine Negative     Methamphetamine, Ur Negative     Opiate Screen Negative     Amphetamine Screen, Urine Negative     Benzodiazepine Screen, Urine Negative     Tricyclic Antidepressants Screen Negative     Methadone Screen, Urine Negative     Barbiturates Screen, Urine Negative     Oxycodone Screen, Urine Negative     Buprenorphine, Screen, Urine Negative    Narrative:      Cutoff For Drugs Screened:    Amphetamines               500 ng/ml  Barbiturates               200 ng/ml  Benzodiazepines            150 ng/ml  Cocaine                    150 ng/ml  Methadone                  200 ng/ml  Opiates                    100 ng/ml  Phencyclidine               25 ng/ml  THC                         50 ng/ml  Methamphetamine            500 ng/ml  Tricyclic Antidepressants  300 ng/ml  Oxycodone                  100 ng/ml  Buprenorphine               10 ng/ml    The normal value for all drugs tested is negative. This report includes unconfirmed screening  results, with the cutoff values listed, to be used for medical treatment purposes only.  Unconfirmed results must not be used for non-medical purposes such as employment or legal testing.  Clinical consideration should be applied to any drug of abuse test, particularly when unconfirmed results are used.    All urine drugs of abuse requests without chain of custody are for medical screening purposes only.  False positives are possible.      Urinalysis With Microscopic If Indicated (No Culture) - Urine, Clean Catch [[id number]]  (Abnormal) Collected: 05/25/25 1122    Specimen: Urine, Clean Catch Updated: 05/25/25 1146     Color, UA Yellow     Appearance, UA Clear     pH, UA <=5.0     Specific Gravity, UA 1.023     Glucose, UA Negative     Ketones, UA Trace     Bilirubin, UA Negative     Blood, UA Small (1+)     Protein, UA Trace     Leuk Esterase, UA Small (1+)     Nitrite, UA Negative     Urobilinogen, UA 1.0 E.U./dL    Urinalysis, Microscopic Only - Urine, Clean Catch [[id number]]  (Abnormal) Collected: 05/25/25 1122    Specimen: Urine, Clean Catch Updated: 05/25/25 1146     RBC, UA 6-10 /HPF      WBC, UA 3-5 /HPF      Bacteria, UA Trace /HPF      Squamous Epithelial Cells, UA 0-2 /HPF      Hyaline Casts, UA 3-6 /LPF      Methodology Automated Microscopy    High Sensitivity Troponin T 1Hr [[id number]]  (Abnormal) Collected: 05/25/25 1104    Specimen: Blood from Arm, Right Updated: 05/25/25 1138     HS Troponin T 122 ng/L      Troponin T Numeric Delta 46 ng/L      Troponin T % Delta 61    Narrative:      High Sensitive Troponin T Reference Range:  <14.0 ng/L- Negative Female for AMI  <22.0 ng/L- Negative Male for AMI  >=14 - Abnormal Female indicating possible myocardial injury.  >=22 - Abnormal Male indicating possible myocardial injury.   Clinicians would have to utilize clinical acumen, EKG, Troponin, and serial changes to determine if it is an Acute Myocardial Infarction or myocardial injury due to an underlying  chronic condition.         T4, Free [[id number]]  (Normal) Collected: 05/25/25 0947    Specimen: Blood from Arm, Right Updated: 05/25/25 1108     Free T4 1.06 ng/dL     Respiratory Panel PCR w/COVID-19(SARS-CoV-2) SUSAN/JOSUÉ/SELVIN/PAD/COR/DIANE In-House, NP Swab in UTM/VTM, 2 HR TAT - Swab, Nasopharynx [[id number]]  (Normal) Collected: 05/25/25 0947    Specimen: Swab from Nasopharynx Updated: 05/25/25 1051     ADENOVIRUS, PCR Not Detected     Coronavirus 229E Not Detected     Coronavirus HKU1 Not Detected     Coronavirus NL63 Not Detected     Coronavirus OC43 Not Detected     COVID19 Not Detected     Human Metapneumovirus Not Detected     Human Rhinovirus/Enterovirus Not Detected     Influenza A PCR Not Detected     Influenza B PCR Not Detected     Parainfluenza Virus 1 Not Detected     Parainfluenza Virus 2 Not Detected     Parainfluenza Virus 3 Not Detected     Parainfluenza Virus 4 Not Detected     RSV, PCR Not Detected     Bordetella pertussis pcr Not Detected     Bordetella parapertussis PCR Not Detected     Chlamydophila pneumoniae PCR Not Detected     Mycoplasma pneumo by PCR Not Detected    Narrative:      In the setting of a positive respiratory panel with a viral infection PLUS a negative procalcitonin without other underlying concern for bacterial infection, consider observing off antibiotics or discontinuation of antibiotics and continue supportive care. If the respiratory panel is positive for atypical bacterial infection (Bordetella pertussis, Chlamydophila pneumoniae, or Mycoplasma pneumoniae), consider antibiotic de-escalation to target atypical bacterial infection.    High Sensitivity Troponin T [[id number]]  (Abnormal) Collected: 05/25/25 0947    Specimen: Blood from Arm, Right Updated: 05/25/25 1047     HS Troponin T 76 ng/L     Narrative:      High Sensitive Troponin T Reference Range:  <14.0 ng/L- Negative Female for AMI  <22.0 ng/L- Negative Male for AMI  >=14 - Abnormal Female indicating possible  myocardial injury.  >=22 - Abnormal Male indicating possible myocardial injury.   Clinicians would have to utilize clinical acumen, EKG, Troponin, and serial changes to determine if it is an Acute Myocardial Infarction or myocardial injury due to an underlying chronic condition.         BNP [[id number]]  (Abnormal) Collected: 05/25/25 0947    Specimen: Blood from Arm, Right Updated: 05/25/25 1040     proBNP 3,092.0 pg/mL     Narrative:      This assay is used as an aid in the diagnosis of individuals suspected of having heart failure. It can be used as an aid in the diagnosis of acute decompensated heart failure (ADHF) in patients presenting with signs and symptoms of ADHF to the emergency department (ED). In addition, NT-proBNP of <300 pg/mL indicates ADHF is not likely.    Age Range Result Interpretation  NT-proBNP Concentration (pg/mL:      <50             Positive            >450                   Gray                 300-450                    Negative             <300    50-75           Positive            >900                  Gray                300-900                  Negative            <300      >75             Positive            >1800                  Gray                300-1800                  Negative            <300    Lipid Panel [[id number]] Collected: 05/25/25 0947    Specimen: Blood from Arm, Right Updated: 05/25/25 1040     Total Cholesterol 139 mg/dL      Triglycerides 61 mg/dL      HDL Cholesterol 45 mg/dL      LDL Cholesterol  81 mg/dL      VLDL Cholesterol 13 mg/dL      LDL/HDL Ratio 1.82    Narrative:      Cholesterol Reference Ranges  (U.S. Department of Health and Human Services ATP III Classifications)    Desirable          <200 mg/dL  Borderline High    200-239 mg/dL  High Risk          >240 mg/dL      Triglyceride Reference Ranges  (U.S. Department of Health and Human Services ATP III Classifications)    Normal           <150 mg/dL  Borderline High  150-199 mg/dL  High              200-499 mg/dL  Very High        >500 mg/dL    HDL Reference Ranges  (U.S. Department of Health and Human Services ATP III Classifications)    Low     <40 mg/dl (major risk factor for CHD)  High    >60 mg/dl ('negative' risk factor for CHD)        LDL Reference Ranges  (U.S. Department of Health and Human Services ATP III Classifications)    Optimal          <100 mg/dL  Near Optimal     100-129 mg/dL  Borderline High  130-159 mg/dL  High             160-189 mg/dL  Very High        >189 mg/dL    LDL is calculated using the NIH LDL-C calculation.      TSH Rfx On Abnormal To Free T4 [[id number]]  (Abnormal) Collected: 05/25/25 0947    Specimen: Blood from Arm, Right Updated: 05/25/25 1040     TSH 4.390 uIU/mL     Basic Metabolic Panel [[id number]]  (Abnormal) Collected: 05/25/25 0947    Specimen: Blood from Arm, Right Updated: 05/25/25 1040     Glucose 128 mg/dL      BUN 21 mg/dL      Creatinine 1.29 mg/dL      Sodium 139 mmol/L      Potassium 4.6 mmol/L      Chloride 104 mmol/L      CO2 24.8 mmol/L      Calcium 9.0 mg/dL      BUN/Creatinine Ratio 16.3     Anion Gap 10.2 mmol/L      eGFR 39.3 mL/min/1.73     Narrative:      GFR Categories in Chronic Kidney Disease (CKD)              GFR Category          GFR (mL/min/1.73)    Interpretation  G1                    90 or greater        Normal or high (1)  G2                    60-89                Mild decrease (1)  G3a                   45-59                Mild to moderate decrease  G3b                   30-44                Moderate to severe decrease  G4                    15-29                Severe decrease  G5                    14 or less           Kidney failure    (1)In the absence of evidence of kidney disease, neither GFR category G1 or G2 fulfill the criteria for CKD.    eGFR calculation 2021 CKD-EPI creatinine equation, which does not include race as a factor    Magnesium [[id number]]  (Normal) Collected: 05/25/25 0947    Specimen: Blood from Arm, Right Updated:  05/25/25 1040     Magnesium 1.8 mg/dL     Phosphorus [[id number]]  (Normal) Collected: 05/25/25 0947    Specimen: Blood from Arm, Right Updated: 05/25/25 1040     Phosphorus 3.6 mg/dL     Hemoglobin A1c [[id number]]  (Abnormal) Collected: 05/25/25 0947    Specimen: Blood from Arm, Right Updated: 05/25/25 1033     Hemoglobin A1C 6.24 %     Narrative:      Hemoglobin A1C Ranges:    Increased Risk for Diabetes  5.7% to 6.4%  Diabetes                     >= 6.5%  Diabetic Goal                < 7.0%    Protime-INR [[id number]]  (Abnormal) Collected: 05/25/25 0947    Specimen: Blood from Arm, Right Updated: 05/25/25 1026     Protime 15.2 Seconds      INR 1.21    CBC & Differential [[id number]]  (Abnormal) Collected: 05/25/25 0947    Specimen: Blood from Arm, Right Updated: 05/25/25 1005    Narrative:      The following orders were created for panel order CBC & Differential.  Procedure                               Abnormality         Status                     ---------                               -----------         ------                     CBC Auto Differential[[id number]]        Abnormal            Final result                 Please view results for these tests on the individual orders.    CBC Auto Differential [[id number]]  (Abnormal) Collected: 05/25/25 0947    Specimen: Blood from Arm, Right Updated: 05/25/25 1005     WBC 8.57 10*3/mm3      RBC 3.49 10*6/mm3      Hemoglobin 10.6 g/dL      Hematocrit 33.2 %      MCV 95.1 fL      MCH 30.4 pg      MCHC 31.9 g/dL      RDW 13.2 %      RDW-SD 45.4 fl      MPV 12.0 fL      Platelets 157 10*3/mm3      Neutrophil % 76.6 %      Lymphocyte % 18.1 %      Monocyte % 4.1 %      Eosinophil % 0.6 %      Basophil % 0.2 %      Immature Grans % 0.4 %      Neutrophils, Absolute 6.57 10*3/mm3      Lymphocytes, Absolute 1.55 10*3/mm3      Monocytes, Absolute 0.35 10*3/mm3      Eosinophils, Absolute 0.05 10*3/mm3      Basophils, Absolute 0.02 10*3/mm3      Immature Grans, Absolute 0.03  "10*3/mm3      nRBC 0.0 /100 WBC              Imaging Results (Last 24 Hours)       ** No results found for the last 24 hours. **              Assessment & Plan        This is a 89 y.o. female with:    Active and Resolved Problems  Active Hospital Problems    Diagnosis  POA    **Paroxysmal atrial fibrillation with rapid ventricular response [I48.0]  Yes    Paroxysmal SVT (supraventricular tachycardia) [I47.10]  Yes    Benign essential HTN [I10]  Yes    Coronary artery disease involving native coronary artery of native heart without angina pectoris [I25.10]  Yes    Mixed hyperlipidemia [E78.2]  Yes      Resolved Hospital Problems   No resolved problems to display.       New onset Atrial Fibrillation with RVR  Hx SVT  Hx HTN  Hx HLD, s/p CABG  EKG on arrival a fib with rate 129  Rate control with: as per cardiology- no gtt at this time. Trial Toprol XL and digoxin  Given SQ lovenox at OSH 1mg/kg, further AC as per cardiology  Cardiology consulted  CXR at OSH- \"No active disease\"  Electrolytes WNL  TSH elevated, free t4 WNL  Troponins 76, 122- trend  proBNP 3092  Echo pending  Telemetry/continuous pulse ox  D dimer elevated at OSH- BLE duplex pending; repeat d dimer pending  Nephrology consulted for clearance for CT PE  Lipid panel WNL  Resume home medications once reconciled and appropriate    Abnormal UA  UA + for blood, protein, leuk esterase, RBC, WBC and bacteria  Start Ceftriaxone  Culture and sensitivity pending    [place]  Cr 1.29 (baseline appears 0.9)  Nephrology consulted for CT PE clearance as well as assistance with diuresis given elevated BNP and BLE edema  Avoid nephrotoxic medications  AM labs ordered      VTE Prophylaxis:  Pharmacologic & mechanical VTE prophylaxis orders are present.        The patient desires to be as follows:    CODE STATUS:    Code Status (Patient has no pulse and is not breathing): CPR (Attempt to Resuscitate)  Medical Interventions (Patient has pulse or is breathing): Full " Support        Lizabeth Brown, who can be contacted at [phone], is the designated person to make medical decisions on the patient's behalf if She is incapable of doing so. This was clarified with patient and/or next of kin on 5/25/2025 during the course of this H&P.    Admission Status:  I believe this patient meets observation status.    Expected Length of Stay: < 2 midnights     PDMP and Medication Dispenses via Sidebar reviewed and consistent with patient reported medications.    I discussed the patient's findings and my recommendations with patient, family, and nursing staff.      Signature:     This document has been electronically signed by ADEEL Munoz on May 25, 2025 13:25 EDT   [facility]

## 2025-05-25 NOTE — CONSULTS
Nephrology Consult Note                                                [facility]      Patient Identification:  Name: Amanda Brown  Age: 89 y.o.  Sex: female  :  6/10/1933  MRN: [id number]               Requesting Physician: Donato Galvan MD  Reason for Consultation: management recommendations      History of Present Illness:    89 y rold female pt h/o  CAD PAF HTN SVT s/p CABG HLP here w CP Afib RVR low BP s/p bolus   Also was found to have elevated DD julisa and high creat   Problem List:    Paroxysmal atrial fibrillation with rapid ventricular response    Coronary artery disease involving native coronary artery of native heart without angina pectoris    Mixed hyperlipidemia    Benign essential HTN    Paroxysmal SVT (supraventricular tachycardia)     Past Medical History:  Past Medical History:   Diagnosis Date    Benign essential HTN 2021    Closed fracture of surgical neck of humerus 2022    Closed supracondylar fracture of left humerus 2021    Coronary artery disease involving native coronary artery of native heart without angina pectoris 2016    Fracture, humerus     left    GERD (gastroesophageal reflux disease)     Mixed hyperlipidemia 2015    Osteoarthritis 2021    Paroxysmal SVT (supraventricular tachycardia) 2024    Pneumonia due to infectious organism 2024    PONV (postoperative nausea and vomiting)      Past Surgical History:  Past Surgical History:   Procedure Laterality Date    ARTERIAL BYPASS SURGERY      CARDIAC CATHETERIZATION      CARDIAC SURGERY      CABG 4V    CORONARY ARTERY BYPASS GRAFT        Home Meds:  Medications Prior to Admission   Medication Sig Dispense Refill Last Dose/Taking    aspirin 81 MG EC tablet Take 1 tablet by mouth Daily. LD 2025    atorvastatin (LIPITOR) 40 MG tablet Take 1 tablet by mouth Daily.   2025    B Complex Vitamins (VITAMIN B COMPLEX PO) Take  by mouth  2 (Two) Times a Week.   Past Week    hydroCHLOROthiazide (HYDRODIURIL) 25 MG tablet Take 1 tablet by mouth Daily.   5/24/2025    lisinopril (PRINIVIL,ZESTRIL) 20 MG tablet Take 1 tablet by mouth 2 (Two) Times a Day.   5/24/2025    Lumigan 0.01 % ophthalmic drops Administer 1 drop to both eyes Every Night.   5/24/2025    meclizine (ANTIVERT) 25 MG tablet Take 1 tablet by mouth Daily.   5/24/2025    metoprolol succinate XL (TOPROL-XL) 25 MG 24 hr tablet Take 1 tablet by mouth 2 (Two) Times a Day. 180 tablet 3 5/24/2025    omeprazole (priLOSEC) 20 MG capsule Take 1 capsule by mouth 4 (Four) Times a Week.   Past Week    timolol (TIMOPTIC) 0.5 % ophthalmic solution timolol maleate 0.5 % eye drops   5/24/2025     Current Meds:     Current Facility-Administered Medications:     acetylcysteine (MUCOMYST) 20 % solution 600 mg, 600 mg, Oral, Q12H, Milly Chavez MD, 600 mg at 05/25/25 2101    aspirin EC tablet 81 mg, 81 mg, Oral, Daily, Solange Wolf, APRN, 81 mg at 05/25/25 1022    atorvastatin (LIPITOR) tablet 40 mg, 40 mg, Oral, Daily, WolfSolange dominique, APRN, 40 mg at 05/25/25 1022    sennosides-docusate (PERICOLACE) 8.6-50 MG per tablet 2 tablet, 2 tablet, Oral, BID PRN **AND** polyethylene glycol (MIRALAX) packet 17 g, 17 g, Oral, Daily PRN **AND** bisacodyl (DULCOLAX) EC tablet 5 mg, 5 mg, Oral, Daily PRN **AND** bisacodyl (DULCOLAX) suppository 10 mg, 10 mg, Rectal, Daily PRN, Sarina Hooker K, APRN    Calcium Replacement - Follow Nurse / BPA Driven Protocol, , Not Applicable, PRN, Sarina Hooker, APRN    cefTRIAXone (ROCEPHIN) 1,000 mg in sodium chloride 0.9 % 100 mL MBP, 1,000 mg, Intravenous, Q24H, Sarina Hooker APRN, Last Rate: 200 mL/hr at 05/25/25 1408, 1,000 mg at 05/25/25 1408    dilTIAZem (CARDIZEM) 125 mg in 125 mL D5W infusion, 5-15 mg/hr, Intravenous, Titrated, Solange Wolf, ADEEL, Last Rate: 5 mL/hr at 05/25/25 1757, 5 mg/hr at 05/25/25 1757    [START ON 5/26/2025] enoxaparin sodium (LOVENOX) syringe 60 mg, 1  "mg/kg, Subcutaneous, Q24H, Solange Wolf APRN    [Held by provider] hydroCHLOROthiazide tablet 25 mg, 25 mg, Oral, Daily, Sarina Hooker APRN    latanoprost (XALATAN) 0.005 % ophthalmic solution 1 drop, 1 drop, Both Eyes, Nightly, Sarina Hooker APRN, 1 drop at 05/25/25 2032    [Held by provider] lisinopril (PRINIVIL,ZESTRIL) tablet 20 mg, 20 mg, Oral, BID, Sarina Hooker APRN    Magnesium Cardiology Dose Replacement - Follow Nurse / BPA Driven Protocol, , Not Applicable, PRN, Sarina Hooker APRN    metoprolol succinate XL (TOPROL-XL) 24 hr tablet 25 mg, 25 mg, Oral, BID, Solange Wolf APRN, 25 mg at 05/25/25 2031    nitroglycerin (NITROSTAT) SL tablet 0.4 mg, 0.4 mg, Sublingual, Q5 Min PRN, Sarina Hooker APRN    Phosphorus Replacement - Follow Nurse / BPA Driven Protocol, , Not Applicable, PRN, Sarina Hooker APRN    Potassium Replacement - Follow Nurse / BPA Driven Protocol, , Not Applicable, PROLIVE, Sarina Hooker APRN    [START ON 5/26/2025] timolol (TIMOPTIC) 0.5 % ophthalmic solution 1 drop, 1 drop, Both Eyes, Daily, Sarina Hooker APRN    Allergies:  No Known Allergies  Social History:   Social History     Tobacco Use    Smoking status: Never     Passive exposure: Never    Smokeless tobacco: Never   Substance Use Topics    Alcohol use: Never      Family History:  Family History   Problem Relation Age of Onset    Lymphoma Mother     Stroke Mother     Asthma Father         Review of Systems  Reviewed 12 systems were reviewed, all negative except for those mentioned in HPI    Objective:  Vitals:   /46 (BP Location: Right arm, Patient Position: Lying)   Pulse 96   Temp 97.8 °F (36.6 °C) (Oral)   Resp 17   Ht 154.9 cm (61\")   Wt 63 kg (138 lb 14.2 oz)   SpO2 95%   BMI 26.24 kg/m²   I/O:     Intake/Output Summary (Last 24 hours) at 5/25/2025 223  Last data filed at 5/25/2025 1625  Gross per 24 hour   Intake 730 ml   Output --   Net 730 ml       Exam:  General Appearance:  Alert  Head:  " Normocephalic, without obvious abnormality, atraumatic  Eyes:  PERRL, conjunctiva/corneas clear     Neck:  Supple,  no adenopathy;      Lungs:  Decreased BS occasion ronchi  Heart:  Regular rate and rhythm, S1 and S2 normal  Abdomen:  Soft, non-tender, bowel sounds active   Extremities: trace edema  Pulses: 2+ and symmetric all extremities  Skin:  No rashes or lesions  Data Review:  All labs (24hrs):   Recent Results (from the past 24 hours)   BNP    Collection Time: 05/25/25  9:47 AM    Specimen: Arm, Right; Blood   Result Value Ref Range    proBNP 3,092.0 (H) 0.0 - 1,800.0 pg/mL   Hemoglobin A1c    Collection Time: 05/25/25  9:47 AM    Specimen: Arm, Right; Blood   Result Value Ref Range    Hemoglobin A1C 6.24 (H) 4.80 - 5.60 %   Lipid Panel    Collection Time: 05/25/25  9:47 AM    Specimen: Arm, Right; Blood   Result Value Ref Range    Total Cholesterol 139 0 - 200 mg/dL    Triglycerides 61 0 - 150 mg/dL    HDL Cholesterol 45 40 - 60 mg/dL    LDL Cholesterol  81 0 - 100 mg/dL    VLDL Cholesterol 13 5 - 40 mg/dL    LDL/HDL Ratio 1.82    Protime-INR    Collection Time: 05/25/25  9:47 AM    Specimen: Arm, Right; Blood   Result Value Ref Range    Protime 15.2 (H) 11.7 - 14.2 Seconds    INR 1.21 (H) 0.90 - 1.10   TSH Rfx On Abnormal To Free T4    Collection Time: 05/25/25  9:47 AM    Specimen: Arm, Right; Blood   Result Value Ref Range    TSH 4.390 (H) 0.270 - 4.200 uIU/mL   High Sensitivity Troponin T    Collection Time: 05/25/25  9:47 AM    Specimen: Arm, Right; Blood   Result Value Ref Range    HS Troponin T 76 (C) <14 ng/L   Basic Metabolic Panel    Collection Time: 05/25/25  9:47 AM    Specimen: Arm, Right; Blood   Result Value Ref Range    Glucose 128 (H) 65 - 99 mg/dL    BUN 21 8 - 23 mg/dL    Creatinine 1.29 (H) 0.57 - 1.00 mg/dL    Sodium 139 136 - 145 mmol/L    Potassium 4.6 3.5 - 5.2 mmol/L    Chloride 104 98 - 107 mmol/L    CO2 24.8 22.0 - 29.0 mmol/L    Calcium 9.0 8.2 - 9.6 mg/dL    BUN/Creatinine Ratio  16.3 7.0 - 25.0    Anion Gap 10.2 5.0 - 15.0 mmol/L    eGFR 39.3 (L) >60.0 mL/min/1.73   Magnesium    Collection Time: 05/25/25  9:47 AM    Specimen: Arm, Right; Blood   Result Value Ref Range    Magnesium 1.8 1.7 - 2.3 mg/dL   Phosphorus    Collection Time: 05/25/25  9:47 AM    Specimen: Arm, Right; Blood   Result Value Ref Range    Phosphorus 3.6 2.5 - 4.5 mg/dL   CBC Auto Differential    Collection Time: 05/25/25  9:47 AM    Specimen: Arm, Right; Blood   Result Value Ref Range    WBC 8.57 3.40 - 10.80 10*3/mm3    RBC 3.49 (L) 3.77 - 5.28 10*6/mm3    Hemoglobin 10.6 (L) 12.0 - 15.9 g/dL    Hematocrit 33.2 (L) 34.0 - 46.6 %    MCV 95.1 79.0 - 97.0 fL    MCH 30.4 26.6 - 33.0 pg    MCHC 31.9 31.5 - 35.7 g/dL    RDW 13.2 12.3 - 15.4 %    RDW-SD 45.4 37.0 - 54.0 fl    MPV 12.0 6.0 - 12.0 fL    Platelets 157 140 - 450 10*3/mm3    Neutrophil % 76.6 (H) 42.7 - 76.0 %    Lymphocyte % 18.1 (L) 19.6 - 45.3 %    Monocyte % 4.1 (L) 5.0 - 12.0 %    Eosinophil % 0.6 0.3 - 6.2 %    Basophil % 0.2 0.0 - 1.5 %    Immature Grans % 0.4 0.0 - 0.5 %    Neutrophils, Absolute 6.57 1.70 - 7.00 10*3/mm3    Lymphocytes, Absolute 1.55 0.70 - 3.10 10*3/mm3    Monocytes, Absolute 0.35 0.10 - 0.90 10*3/mm3    Eosinophils, Absolute 0.05 0.00 - 0.40 10*3/mm3    Basophils, Absolute 0.02 0.00 - 0.20 10*3/mm3    Immature Grans, Absolute 0.03 0.00 - 0.05 10*3/mm3    nRBC 0.0 0.0 - 0.2 /100 WBC   Respiratory Panel PCR w/COVID-19(SARS-CoV-2) SUSAN/JOSUÉ/SELVIN/PAD/COR/DIANE In-House, NP Swab in UTM/VTM, 2 HR TAT - Swab, Nasopharynx    Collection Time: 05/25/25  9:47 AM    Specimen: Nasopharynx; Swab   Result Value Ref Range    ADENOVIRUS, PCR Not Detected Not Detected    Coronavirus 229E Not Detected Not Detected    Coronavirus HKU1 Not Detected Not Detected    Coronavirus NL63 Not Detected Not Detected    Coronavirus OC43 Not Detected Not Detected    COVID19 Not Detected Not Detected - Ref. Range    Human Metapneumovirus Not Detected Not Detected    Human  Rhinovirus/Enterovirus Not Detected Not Detected    Influenza A PCR Not Detected Not Detected    Influenza B PCR Not Detected Not Detected    Parainfluenza Virus 1 Not Detected Not Detected    Parainfluenza Virus 2 Not Detected Not Detected    Parainfluenza Virus 3 Not Detected Not Detected    Parainfluenza Virus 4 Not Detected Not Detected    RSV, PCR Not Detected Not Detected    Bordetella pertussis pcr Not Detected Not Detected    Bordetella parapertussis PCR Not Detected Not Detected    Chlamydophila pneumoniae PCR Not Detected Not Detected    Mycoplasma pneumo by PCR Not Detected Not Detected   T4, Free    Collection Time: 05/25/25  9:47 AM    Specimen: Arm, Right; Blood   Result Value Ref Range    Free T4 1.06 0.92 - 1.68 ng/dL   D-dimer, Quantitative    Collection Time: 05/25/25  9:47 AM    Specimen: Arm, Right; Blood   Result Value Ref Range    D-Dimer, Quantitative 14.68 (H) 0.00 - 0.91 MCGFEU/mL   ECG 12 Lead Rhythm Change    Collection Time: 05/25/25 10:12 AM   Result Value Ref Range    QT Interval 347 ms    QTC Interval 508 ms   Adult Transthoracic Echo Complete w/ Color, Spectral and Contrast if necessary per protocol    Collection Time: 05/25/25 10:48 AM   Result Value Ref Range    EF(MOD-bp) 42.9 %    LVIDd 3.8 cm    LVIDs 3.0 cm    IVSd 0.99 cm    LVPWd 1.04 cm    FS 22.3 %    IVS/LVPW 0.95 cm    ESV(cubed) 25.9 ml    LV Sys Vol (BSA corrected) 25.5 cm2    EDV(cubed) 55.1 ml    LV Bee Vol (BSA corrected) 46.4 cm2    LV mass(C)d 119.8 grams    LVOT area 2.9 cm2    LVOT diam 1.92 cm    EDV(MOD-sp2) 46.9 ml    EDV(MOD-sp4) 74.8 ml    ESV(MOD-sp2) 28.1 ml    ESV(MOD-sp4) 41.1 ml    SV(MOD-sp2) 18.8 ml    SV(MOD-sp4) 33.7 ml    SVi(MOD-SP2) 11.7 ml/m2    SVi(MOD-SP4) 20.9 ml/m2    SVi (LVOT) 23.5 ml/m2    EF(MOD-sp2) 40.1 %    EF(MOD-sp4) 45.1 %    MV E max jack 95.9 cm/sec    MV A max jack 33.0 cm/sec    MV dec time 0.11 sec    MV E/A 2.9     LA ESV Index (BP) 52.2 ml/m2    Lat Peak E' Jack 7.8 cm/sec     TR max david 306.7 cm/sec    SV(LVOT) 37.9 ml    RV Base 4.1 cm    RV Mid 3.7 cm    RV Length 7.5 cm    TAPSE (>1.6) 1.36 cm    LA dimension (2D)  4.9 cm    LV V1 max 65.0 cm/sec    LV V1 max PG 1.69 mmHg    LV V1 mean PG 0.92 mmHg    LV V1 VTI 13.0 cm    Ao pk david 132.6 cm/sec    Ao max PG 7.0 mmHg    Ao mean PG 4.1 mmHg    Ao V2 VTI 25.6 cm    PARVIN(I,D) 1.48 cm2    Dimensionless Index 0.51 (DI)    AI P1/2t 241.5 msec    MV max PG 3.8 mmHg    MV mean PG 1.65 mmHg    MV V2 VTI 20.6 cm    MV P1/2t 54.9 msec    MVA(P1/2t) 4.0 cm2    MVA(VTI) 1.84 cm2    MV dec slope 578.0 cm/sec2    MR max david 523.0 cm/sec    MR max .4 mmHg    MR mean david 339.6 cm/sec    MR mean PG 49.6 mmHg    MR .9 cm    TR max PG 37.6 mmHg    RVSP(TR) 52.6 mmHg    RAP systole 15.0 mmHg    RV V1 max PG 1.56 mmHg    RV V1 max 62.4 cm/sec    RV V1 VTI 15.4 cm    PA V2 max 75.6 cm/sec    PA acc time 0.17 sec    PI end-d david 146.9 cm/sec    ACS 1.41 cm    Sinus 2.9 cm    STJ 2.40 cm   High Sensitivity Troponin T 1Hr    Collection Time: 05/25/25 11:04 AM    Specimen: Arm, Right; Blood   Result Value Ref Range    HS Troponin T 122 (C) <14 ng/L    Troponin T Numeric Delta 46 (C) Abnormal if >/=3 ng/L    Troponin T % Delta 61 (C) Abnormal if >/= 20%   Urine Drug Screen - Urine, Clean Catch    Collection Time: 05/25/25 11:22 AM    Specimen: Urine, Clean Catch   Result Value Ref Range    THC, Screen, Urine Negative Negative    Phencyclidine (PCP), Urine Negative Negative    Cocaine Screen, Urine Negative Negative    Methamphetamine, Ur Negative Negative    Opiate Screen Negative Negative    Amphetamine Screen, Urine Negative Negative    Benzodiazepine Screen, Urine Negative Negative    Tricyclic Antidepressants Screen Negative Negative    Methadone Screen, Urine Negative Negative    Barbiturates Screen, Urine Negative Negative    Oxycodone Screen, Urine Negative Negative    Buprenorphine, Screen, Urine Negative Negative   Urinalysis With Microscopic  If Indicated (No Culture) - Urine, Clean Catch    Collection Time: 05/25/25 11:22 AM    Specimen: Urine, Clean Catch   Result Value Ref Range    Color, UA Yellow Yellow, Straw    Appearance, UA Clear Clear    pH, UA <=5.0 5.0 - 8.0    Specific Gravity, UA 1.023 1.005 - 1.030    Glucose, UA Negative Negative    Ketones, UA Trace (A) Negative    Bilirubin, UA Negative Negative    Blood, UA Small (1+) (A) Negative    Protein, UA Trace (A) Negative    Leuk Esterase, UA Small (1+) (A) Negative    Nitrite, UA Negative Negative    Urobilinogen, UA 1.0 E.U./dL 0.2 - 1.0 E.U./dL   Urinalysis, Microscopic Only - Urine, Clean Catch    Collection Time: 05/25/25 11:22 AM    Specimen: Urine, Clean Catch   Result Value Ref Range    RBC, UA 6-10 (A) None Seen, 0-2 /HPF    WBC, UA 3-5 (A) None Seen, 0-2 /HPF    Bacteria, UA Trace (A) None Seen /HPF    Squamous Epithelial Cells, UA 0-2 None Seen, 0-2 /HPF    Hyaline Casts, UA 3-6 None Seen /LPF    Methodology Automated Microscopy    Duplex Venous Lower Extremity - Bilateral CAR    Collection Time: 05/25/25  2:48 PM   Result Value Ref Range    Right Common Femoral Spont Y     Right Common Femoral Competent Y     Right Common Femoral Phasic N     Right Common Femoral Compress C     Right Common Femoral Augment Y     Right Saphenofemoral Junction Compress C     Right Proximal Femoral Compress C     Right Mid Femoral Spont Y     Right Mid Femoral Competent Y     Right Mid Femoral Phasic Y     Right Mid Femoral Compress C     Right Mid Femoral Augment Y     Right Distal Femoral Compress C     Right Popliteal Spont Y     Right Popliteal Competent Y     Right Popliteal Phasic Y     Right Popliteal Compress C     Right Popliteal Augment Y     Right Posterior Tibial Compress C     Right Peroneal Compress C     Right Gastronemius Compress C     Right Greater Saph AK Compress C     Right Greater Saph BK Compress C     Right Lesser Saph Compress C     Left Common Femoral Spont Y     Left Common  Femoral Competent Y     Left Common Femoral Phasic N     Left Common Femoral Compress C     Left Common Femoral Augment Y     Left Saphenofemoral Junction Compress C     Left Proximal Femoral Compress C     Left Mid Femoral Spont Y     Left Mid Femoral Competent Y     Left Mid Femoral Phasic Y     Left Mid Femoral Compress C     Left Mid Femoral Augment Y     Left Distal Femoral Compress C     Left Popliteal Spont Y     Left Popliteal Competent Y     Left Popliteal Phasic Y     Left Popliteal Compress C     Left Popliteal Augment Y     Left Posterior Tibial Compress C     Left Peroneal Compress C     Left Gastronemius Compress C     Left Greater Saph BK Compress C     Left Lesser Saph Compress C    High Sensitivity Troponin T    Collection Time: 05/25/25  2:54 PM    Specimen: Blood   Result Value Ref Range    HS Troponin T 394 (C) <14 ng/L       Current Facility-Administered Medications:     acetylcysteine (MUCOMYST) 20 % solution 600 mg, 600 mg, Oral, Q12H, Milly Chavez MD, 600 mg at 05/25/25 2101    aspirin EC tablet 81 mg, 81 mg, Oral, Daily, Solange Wolf, APRN, 81 mg at 05/25/25 1022    atorvastatin (LIPITOR) tablet 40 mg, 40 mg, Oral, Daily, Solange Wolf, APRN, 40 mg at 05/25/25 1022    sennosides-docusate (PERICOLACE) 8.6-50 MG per tablet 2 tablet, 2 tablet, Oral, BID PRN **AND** polyethylene glycol (MIRALAX) packet 17 g, 17 g, Oral, Daily PRN **AND** bisacodyl (DULCOLAX) EC tablet 5 mg, 5 mg, Oral, Daily PRN **AND** bisacodyl (DULCOLAX) suppository 10 mg, 10 mg, Rectal, Daily PRN, Sarina Hooker APRN    Calcium Replacement - Follow Nurse / BPA Driven Protocol, , Not Applicable, PRN, Sarina Hooker APRN    cefTRIAXone (ROCEPHIN) 1,000 mg in sodium chloride 0.9 % 100 mL MBP, 1,000 mg, Intravenous, Q24H, Sarina Hooker APRN, Last Rate: 200 mL/hr at 05/25/25 1408, 1,000 mg at 05/25/25 1408    dilTIAZem (CARDIZEM) 125 mg in 125 mL D5W infusion, 5-15 mg/hr, Intravenous, Titrated, Wolf, Solange, APRN, Last  Rate: 5 mL/hr at 05/25/25 1757, 5 mg/hr at 05/25/25 1757    [START ON 5/26/2025] enoxaparin sodium (LOVENOX) syringe 60 mg, 1 mg/kg, Subcutaneous, Q24H, Solange Wolf APRN    [Held by provider] hydroCHLOROthiazide tablet 25 mg, 25 mg, Oral, Daily, Sarina Hooker APRN    latanoprost (XALATAN) 0.005 % ophthalmic solution 1 drop, 1 drop, Both Eyes, Nightly, Sarina Hooker APRN, 1 drop at 05/25/25 2032    [Held by provider] lisinopril (PRINIVIL,ZESTRIL) tablet 20 mg, 20 mg, Oral, BID, Sarina Hooker APRN    Magnesium Cardiology Dose Replacement - Follow Nurse / BPA Driven Protocol, , Not Applicable, PRN, Sarina Hooker APRN    metoprolol succinate XL (TOPROL-XL) 24 hr tablet 25 mg, 25 mg, Oral, BID, Solange Wolf APRN, 25 mg at 05/25/25 2031    nitroglycerin (NITROSTAT) SL tablet 0.4 mg, 0.4 mg, Sublingual, Q5 Min PRN, Sarina Hooker APRN    Phosphorus Replacement - Follow Nurse / BPA Driven Protocol, , Not Applicable, PRN, Sarina Hooker APRN    Potassium Replacement - Follow Nurse / BPA Driven Protocol, , Not Applicable, PRN, Sarina Hooker APRN    [START ON 5/26/2025] timolol (TIMOPTIC) 0.5 % ophthalmic solution 1 drop, 1 drop, Both Eyes, Daily, Sarina Hooker APRN    Assessment:  AUGUSTO/CKD  Afib  HTN  SVT  CAD s/p CABG  HLP  Edema    Recommendations:  Pt is at mild to medirate risk of CODY  Add mucomyst  May proceed w  CT OE protocol  Sedrick oliveira after  CT + effect is over        Milly Chavez MD  5/25/2025  22:34 EDT

## 2025-05-25 NOTE — PLAN OF CARE
Goal Outcome Evaluation:              Outcome Evaluation: Pt came in from [facility] with afib with RVR. Pt last Troponin 394. Dimer was elevated but CT PE was negative. Pt stated on antibiotics due to WBC count in urine. Magnesium was replaced redraw for in the morning. Pt started on a Cardizem drip currently going at 5mg/hr. Pt NPO at midnight for a stress test in the AM. No complaints of CP on my shift. Pt stable at this time.

## 2025-05-26 PROBLEM — I48.91 A-FIB: Status: ACTIVE | Noted: 2025-05-26

## 2025-05-26 PROBLEM — R79.89 ELEVATED TROPONIN: Status: ACTIVE | Noted: 2025-05-25

## 2025-05-26 LAB
ALBUMIN SERPL-MCNC: 3.8 G/DL (ref 3.5–5.2)
ALBUMIN/GLOB SERPL: 1.6 G/DL
ALP SERPL-CCNC: 89 U/L (ref 39–117)
ALT SERPL W P-5'-P-CCNC: 40 U/L (ref 1–33)
ANION GAP SERPL CALCULATED.3IONS-SCNC: 10.9 MMOL/L (ref 5–15)
AST SERPL-CCNC: 86 U/L (ref 1–32)
BACTERIA SPEC AEROBE CULT: NORMAL
BASOPHILS # BLD AUTO: 0.03 10*3/MM3 (ref 0–0.2)
BASOPHILS NFR BLD AUTO: 0.3 % (ref 0–1.5)
BILIRUB SERPL-MCNC: 0.5 MG/DL (ref 0–1.2)
BUN SERPL-MCNC: 16 MG/DL (ref 8–23)
BUN/CREAT SERPL: 13.7 (ref 7–25)
CALCIUM SPEC-SCNC: 8.9 MG/DL (ref 8.2–9.6)
CHLORIDE SERPL-SCNC: 104 MMOL/L (ref 98–107)
CO2 SERPL-SCNC: 26.1 MMOL/L (ref 22–29)
CREAT SERPL-MCNC: 1.17 MG/DL (ref 0.57–1)
DEPRECATED RDW RBC AUTO: 45.1 FL (ref 37–54)
EGFRCR SERPLBLD CKD-EPI 2021: 44.1 ML/MIN/1.73
EOSINOPHIL # BLD AUTO: 0.21 10*3/MM3 (ref 0–0.4)
EOSINOPHIL NFR BLD AUTO: 2.3 % (ref 0.3–6.2)
ERYTHROCYTE [DISTWIDTH] IN BLOOD BY AUTOMATED COUNT: 13.2 % (ref 12.3–15.4)
GLOBULIN UR ELPH-MCNC: 2.4 GM/DL
GLUCOSE SERPL-MCNC: 110 MG/DL (ref 65–99)
HCT VFR BLD AUTO: 31.5 % (ref 34–46.6)
HGB BLD-MCNC: 10.1 G/DL (ref 12–15.9)
IMM GRANULOCYTES # BLD AUTO: 0.03 10*3/MM3 (ref 0–0.05)
IMM GRANULOCYTES NFR BLD AUTO: 0.3 % (ref 0–0.5)
LYMPHOCYTES # BLD AUTO: 1.89 10*3/MM3 (ref 0.7–3.1)
LYMPHOCYTES NFR BLD AUTO: 20.8 % (ref 19.6–45.3)
MAGNESIUM SERPL-MCNC: 2.2 MG/DL (ref 1.7–2.3)
MCH RBC QN AUTO: 30.3 PG (ref 26.6–33)
MCHC RBC AUTO-ENTMCNC: 32.1 G/DL (ref 31.5–35.7)
MCV RBC AUTO: 94.6 FL (ref 79–97)
MONOCYTES # BLD AUTO: 0.67 10*3/MM3 (ref 0.1–0.9)
MONOCYTES NFR BLD AUTO: 7.4 % (ref 5–12)
NEUTROPHILS NFR BLD AUTO: 6.25 10*3/MM3 (ref 1.7–7)
NEUTROPHILS NFR BLD AUTO: 68.9 % (ref 42.7–76)
NRBC BLD AUTO-RTO: 0 /100 WBC (ref 0–0.2)
PHOSPHATE SERPL-MCNC: 2.6 MG/DL (ref 2.5–4.5)
PLATELET # BLD AUTO: 153 10*3/MM3 (ref 140–450)
PMV BLD AUTO: 11.7 FL (ref 6–12)
POTASSIUM SERPL-SCNC: 4.2 MMOL/L (ref 3.5–5.2)
PROT SERPL-MCNC: 6.2 G/DL (ref 6–8.5)
RBC # BLD AUTO: 3.33 10*6/MM3 (ref 3.77–5.28)
SODIUM SERPL-SCNC: 141 MMOL/L (ref 136–145)
TROPONIN T SERPL HS-MCNC: 607 NG/L
WBC NRBC COR # BLD AUTO: 9.08 10*3/MM3 (ref 3.4–10.8)

## 2025-05-26 PROCEDURE — 85025 COMPLETE CBC W/AUTO DIFF WBC: CPT

## 2025-05-26 PROCEDURE — C1769 GUIDE WIRE: HCPCS | Performed by: INTERNAL MEDICINE

## 2025-05-26 PROCEDURE — 84100 ASSAY OF PHOSPHORUS: CPT

## 2025-05-26 PROCEDURE — B2181ZZ FLUOROSCOPY OF LEFT INTERNAL MAMMARY BYPASS GRAFT USING LOW OSMOLAR CONTRAST: ICD-10-PCS | Performed by: INTERNAL MEDICINE

## 2025-05-26 PROCEDURE — 99152 MOD SED SAME PHYS/QHP 5/>YRS: CPT | Performed by: INTERNAL MEDICINE

## 2025-05-26 PROCEDURE — 4A023N7 MEASUREMENT OF CARDIAC SAMPLING AND PRESSURE, LEFT HEART, PERCUTANEOUS APPROACH: ICD-10-PCS | Performed by: INTERNAL MEDICINE

## 2025-05-26 PROCEDURE — 25810000003 SODIUM CHLORIDE 0.9 % SOLUTION: Performed by: INTERNAL MEDICINE

## 2025-05-26 PROCEDURE — 99233 SBSQ HOSP IP/OBS HIGH 50: CPT | Performed by: INTERNAL MEDICINE

## 2025-05-26 PROCEDURE — 80053 COMPREHEN METABOLIC PANEL: CPT | Performed by: INTERNAL MEDICINE

## 2025-05-26 PROCEDURE — 25010000002 CEFTRIAXONE PER 250 MG

## 2025-05-26 PROCEDURE — 25010000002 LIDOCAINE 1 % SOLUTION: Performed by: INTERNAL MEDICINE

## 2025-05-26 PROCEDURE — 93459 L HRT ART/GRFT ANGIO: CPT | Performed by: INTERNAL MEDICINE

## 2025-05-26 PROCEDURE — 83735 ASSAY OF MAGNESIUM: CPT

## 2025-05-26 PROCEDURE — 93010 ELECTROCARDIOGRAM REPORT: CPT | Performed by: INTERNAL MEDICINE

## 2025-05-26 PROCEDURE — B2131ZZ FLUOROSCOPY OF MULTIPLE CORONARY ARTERY BYPASS GRAFTS USING LOW OSMOLAR CONTRAST: ICD-10-PCS | Performed by: INTERNAL MEDICINE

## 2025-05-26 PROCEDURE — 25510000001 IOPAMIDOL PER 1 ML: Performed by: INTERNAL MEDICINE

## 2025-05-26 PROCEDURE — 93005 ELECTROCARDIOGRAM TRACING: CPT | Performed by: STUDENT IN AN ORGANIZED HEALTH CARE EDUCATION/TRAINING PROGRAM

## 2025-05-26 PROCEDURE — C1894 INTRO/SHEATH, NON-LASER: HCPCS | Performed by: INTERNAL MEDICINE

## 2025-05-26 PROCEDURE — 84484 ASSAY OF TROPONIN QUANT: CPT | Performed by: NURSE PRACTITIONER

## 2025-05-26 PROCEDURE — 25010000002 MIDAZOLAM PER 1 MG: Performed by: INTERNAL MEDICINE

## 2025-05-26 PROCEDURE — 25010000002 FENTANYL CITRATE (PF) 100 MCG/2ML SOLUTION: Performed by: INTERNAL MEDICINE

## 2025-05-26 PROCEDURE — 25010000002 AMIODARONE IN DEXTROSE 5% 360-4.14 MG/200ML-% SOLUTION: Performed by: INTERNAL MEDICINE

## 2025-05-26 PROCEDURE — 25010000002 AMIODARONE IN DEXTROSE 5% 150-4.21 MG/100ML-% SOLUTION: Performed by: INTERNAL MEDICINE

## 2025-05-26 PROCEDURE — 99222 1ST HOSP IP/OBS MODERATE 55: CPT | Performed by: INTERNAL MEDICINE

## 2025-05-26 PROCEDURE — 93005 ELECTROCARDIOGRAM TRACING: CPT | Performed by: INTERNAL MEDICINE

## 2025-05-26 PROCEDURE — B2111ZZ FLUOROSCOPY OF MULTIPLE CORONARY ARTERIES USING LOW OSMOLAR CONTRAST: ICD-10-PCS | Performed by: INTERNAL MEDICINE

## 2025-05-26 RX ORDER — IOPAMIDOL 755 MG/ML
INJECTION, SOLUTION INTRAVASCULAR
Status: DISCONTINUED | OUTPATIENT
Start: 2025-05-26 | End: 2025-05-26 | Stop reason: HOSPADM

## 2025-05-26 RX ORDER — AMIODARONE HYDROCHLORIDE 200 MG/1
200 TABLET ORAL DAILY
Status: DISCONTINUED | OUTPATIENT
Start: 2025-06-17 | End: 2025-05-29 | Stop reason: HOSPADM

## 2025-05-26 RX ORDER — ACETAMINOPHEN 325 MG/1
650 TABLET ORAL EVERY 4 HOURS PRN
Status: DISCONTINUED | OUTPATIENT
Start: 2025-05-26 | End: 2025-05-29 | Stop reason: HOSPADM

## 2025-05-26 RX ORDER — MIDAZOLAM HYDROCHLORIDE 1 MG/ML
INJECTION, SOLUTION INTRAMUSCULAR; INTRAVENOUS
Status: DISCONTINUED | OUTPATIENT
Start: 2025-05-26 | End: 2025-05-26 | Stop reason: HOSPADM

## 2025-05-26 RX ORDER — FENTANYL CITRATE 50 UG/ML
INJECTION, SOLUTION INTRAMUSCULAR; INTRAVENOUS
Status: DISCONTINUED | OUTPATIENT
Start: 2025-05-26 | End: 2025-05-26 | Stop reason: HOSPADM

## 2025-05-26 RX ORDER — AMIODARONE HYDROCHLORIDE 200 MG/1
200 TABLET ORAL EVERY 12 HOURS
Status: DISCONTINUED | OUTPATIENT
Start: 2025-06-03 | End: 2025-05-29 | Stop reason: HOSPADM

## 2025-05-26 RX ORDER — SODIUM CHLORIDE 9 MG/ML
75 INJECTION, SOLUTION INTRAVENOUS CONTINUOUS
Status: DISPENSED | OUTPATIENT
Start: 2025-05-26 | End: 2025-05-26

## 2025-05-26 RX ORDER — AMIODARONE HYDROCHLORIDE 200 MG/1
200 TABLET ORAL EVERY 8 HOURS
Status: DISCONTINUED | OUTPATIENT
Start: 2025-05-27 | End: 2025-05-29 | Stop reason: HOSPADM

## 2025-05-26 RX ORDER — ONDANSETRON 4 MG/1
4 TABLET, ORALLY DISINTEGRATING ORAL EVERY 6 HOURS PRN
Status: DISCONTINUED | OUTPATIENT
Start: 2025-05-26 | End: 2025-05-29 | Stop reason: HOSPADM

## 2025-05-26 RX ORDER — NITROGLYCERIN 0.4 MG/1
0.4 TABLET SUBLINGUAL
Status: DISCONTINUED | OUTPATIENT
Start: 2025-05-26 | End: 2025-05-29 | Stop reason: HOSPADM

## 2025-05-26 RX ORDER — DIPHENHYDRAMINE HCL 25 MG
25 CAPSULE ORAL EVERY 6 HOURS PRN
Status: DISCONTINUED | OUTPATIENT
Start: 2025-05-26 | End: 2025-05-29 | Stop reason: HOSPADM

## 2025-05-26 RX ORDER — LIDOCAINE HYDROCHLORIDE 10 MG/ML
INJECTION, SOLUTION INFILTRATION; PERINEURAL
Status: DISCONTINUED | OUTPATIENT
Start: 2025-05-26 | End: 2025-05-26 | Stop reason: HOSPADM

## 2025-05-26 RX ORDER — AMIODARONE HYDROCHLORIDE 200 MG/1
200 TABLET ORAL ONCE
Status: COMPLETED | OUTPATIENT
Start: 2025-05-27 | End: 2025-05-27

## 2025-05-26 RX ORDER — ONDANSETRON 2 MG/ML
4 INJECTION INTRAMUSCULAR; INTRAVENOUS EVERY 6 HOURS PRN
Status: DISCONTINUED | OUTPATIENT
Start: 2025-05-26 | End: 2025-05-29 | Stop reason: HOSPADM

## 2025-05-26 RX ADMIN — AMIODARONE HYDROCHLORIDE 0.5 MG/MIN: 1.8 INJECTION, SOLUTION INTRAVENOUS at 21:02

## 2025-05-26 RX ADMIN — CEFTRIAXONE SODIUM 1000 MG: 1 INJECTION, POWDER, FOR SOLUTION INTRAMUSCULAR; INTRAVENOUS at 15:10

## 2025-05-26 RX ADMIN — ATORVASTATIN CALCIUM 40 MG: 40 TABLET, FILM COATED ORAL at 09:22

## 2025-05-26 RX ADMIN — AMIODARONE HYDROCHLORIDE 1 MG/MIN: 1.8 INJECTION, SOLUTION INTRAVENOUS at 15:10

## 2025-05-26 RX ADMIN — Medication 5 MG/HR: at 06:56

## 2025-05-26 RX ADMIN — AMIODARONE HYDROCHLORIDE 1 MG/MIN: 1.8 INJECTION, SOLUTION INTRAVENOUS at 20:21

## 2025-05-26 RX ADMIN — METOPROLOL SUCCINATE 25 MG: 25 TABLET, EXTENDED RELEASE ORAL at 20:21

## 2025-05-26 RX ADMIN — ASPIRIN 81 MG: 81 TABLET, COATED ORAL at 09:22

## 2025-05-26 RX ADMIN — SODIUM CHLORIDE 75 ML/HR: 9 INJECTION, SOLUTION INTRAVENOUS at 11:00

## 2025-05-26 NOTE — CONSULTS
Referring Provider: Princess Kim MD    Reason for Consultation: Interventional cardiology consultation for non-ST elevation MI      Patient Care Team:  Ranulfo Mims MD as PCP - General (Internal Medicine)  Deep Aceves MD as Consulting Physician (Cardiology)      SUBJECTIVE     Chief Complaint: Chest pain, palpitations, shortness of breath    History of present illness:  Amanda Brown is a 89 y.o. female patient of Dr. Aceves, with hypertension, hyperlipidemia, SVT status post cardioversion, coronary artery disease status post CABG in 2009 who presented to the hospital with complaints of chest pain and palpitations.  She was noted to be in atrial fibrillation with rapid ventricular response.  She was started on anticoagulation and Cardizem.  Patient remains tachycardic, complained of chest pain.  High-sensitivity troponin has increased from [phone]- [phone].  Interventional cardiology has been consulted to determine candidacy and timing for cardiac catheterization.      Review of systems:    Constitutional: No weakness, fatigue, fever, rigors, chills   Eyes: No vision changes, eye pain   ENT/oropharynx: No difficulty swallowing, sore throat, epistaxis, changes in hearing   Cardiovascular: + chest pain, chest tightness, +palpitations, paroxysmal nocturnal dyspnea, orthopnea, diaphoresis, dizziness / syncopal episode   Respiratory: No shortness of breath, dyspnea on exertion, cough, wheezing, hemoptysis   Gastrointestinal: No abdominal pain, nausea, vomiting, diarrhea, bloody stools   Genitourinary: No hematuria, dysuria   Neurological: No headache, tremors, numbness, one-sided weakness    Musculoskeletal: No cramps, myalgias, joint pain, joint swelling   Integument: No rash, edema        Personal History:      Past Medical History:   Diagnosis Date    Benign essential HTN 11/30/2021    Closed fracture of surgical neck of humerus 02/21/2022    Closed supracondylar fracture of left humerus  11/30/2021    Coronary artery disease involving native coronary artery of native heart without angina pectoris 03/27/2016    Fracture, humerus 2021    left    GERD (gastroesophageal reflux disease)     Mixed hyperlipidemia 09/27/2015    Osteoarthritis 11/30/2021    Paroxysmal SVT (supraventricular tachycardia) 01/07/2024    Pneumonia due to infectious organism 01/07/2024    PONV (postoperative nausea and vomiting)        Past Surgical History:   Procedure Laterality Date    ARTERIAL BYPASS SURGERY      CARDIAC CATHETERIZATION      CARDIAC SURGERY  2009    CABG 4V    CORONARY ARTERY BYPASS GRAFT         Family History   Problem Relation Age of Onset    Lymphoma Mother     Stroke Mother     Asthma Father        Social History     Tobacco Use    Smoking status: Never     Passive exposure: Never    Smokeless tobacco: Never   Vaping Use    Vaping status: Never Used   Substance Use Topics    Alcohol use: Never    Drug use: Never        Home meds:  Prior to Admission medications    Medication Sig Start Date End Date Taking? Authorizing Provider   aspirin 81 MG EC tablet Take 1 tablet by mouth Daily. LD 11/29   Yes Edis Hsu MD   atorvastatin (LIPITOR) 40 MG tablet Take 1 tablet by mouth Daily. 9/28/23  Yes Edis Hsu MD   B Complex Vitamins (VITAMIN B COMPLEX PO) Take  by mouth 2 (Two) Times a Week.   Yes Edis Hsu MD   hydroCHLOROthiazide (HYDRODIURIL) 25 MG tablet Take 1 tablet by mouth Daily. 7/14/22  Yes Edis Hsu MD   lisinopril (PRINIVIL,ZESTRIL) 20 MG tablet Take 1 tablet by mouth 2 (Two) Times a Day.   Yes Edis Hsu MD Lumigan 0.01 % ophthalmic drops Administer 1 drop to both eyes Every Night. 7/8/24  Yes Edis Hsu MD   meclizine (ANTIVERT) 25 MG tablet Take 1 tablet by mouth Daily. 3/21/25  Yes Edis Hsu MD   metoprolol succinate XL (TOPROL-XL) 25 MG 24 hr tablet Take 1 tablet by mouth 2 (Two) Times a Day. 5/8/25  Yes Yvonne  "ADEEL Duggan   omeprazole (priLOSEC) 20 MG capsule Take 1 capsule by mouth 4 (Four) Times a Week.   Yes Provider, MD Edis   timolol (TIMOPTIC) 0.5 % ophthalmic solution timolol maleate 0.5 % eye drops   Yes Provider, Edis, MD       Allergies:     Patient has no known allergies.    Scheduled Meds:acetylcysteine, 600 mg, Oral, Q12H  aspirin, 81 mg, Oral, Daily  atorvastatin, 40 mg, Oral, Daily  cefTRIAXone, 1,000 mg, Intravenous, Q24H  [Held by provider] enoxaparin sodium, 1 mg/kg, Subcutaneous, Q24H  [Held by provider] hydroCHLOROthiazide, 25 mg, Oral, Daily  [Held by provider] lisinopril, 20 mg, Oral, BID  metoprolol succinate XL, 25 mg, Oral, BID  timolol, 1 drop, Both Eyes, Daily      Continuous Infusions:dilTIAZem, 5-15 mg/hr, Last Rate: 5 mg/hr (05/26/25 0656)  sodium chloride, 75 mL/hr, Last Rate: 75 mL/hr (05/26/25 1100)      PRN Meds:  senna-docusate sodium **AND** polyethylene glycol **AND** bisacodyl **AND** bisacodyl    Calcium Replacement - Follow Nurse / BPA Driven Protocol    Magnesium Cardiology Dose Replacement - Follow Nurse / BPA Driven Protocol    nitroglycerin    Phosphorus Replacement - Follow Nurse / BPA Driven Protocol    Potassium Replacement - Follow Nurse / BPA Driven Protocol      OBJECTIVE    Vital Signs  Vitals:    05/26/25 0930 05/26/25 1000 05/26/25 1030 05/26/25 1100   BP: 133/90 129/61 135/74 130/70   BP Location:       Patient Position:       Pulse: 100 94 112 116   Resp:       Temp:       TempSrc:       SpO2: 92% 93% 94% 94%   Weight:       Height:           Flowsheet Rows      Flowsheet Row First Filed Value   Admission Height 154.9 cm (61\") Documented at 05/25/2025 0840   Admission Weight 63 kg (138 lb 14.2 oz) Documented at 05/25/2025 0840              Intake/Output Summary (Last 24 hours) at 5/26/2025 1226  Last data filed at 5/25/2025 1625  Gross per 24 hour   Intake 240 ml   Output --   Net 240 ml        Telemetry: Atrial fibrillation with rapid ventricular " rate    Physical Exam:  The patient is alert, oriented and in no distress.  Vital signs as noted above.  Head and neck revealed no carotid bruits or jugular venous distention.  No thyromegaly or lymphadenopathy is present  Lungs clear.  No wheezing.  Breath sounds are normal bilaterally.  Heart: Normal first and second heart sounds. No murmur.  No precordial rub is present.  No gallop is present.  Abdomen: Soft and nontender.  No organomegaly is present.  Extremities with good peripheral pulses without any pedal edema.  Skin: Warm and dry.  Musculoskeletal system is grossly normal.  CNS grossly normal.       Results Review:  I have personally reviewed the results from the time of this admission to 5/26/2025 12:26 EDT and agree with these findings:  []  Laboratory  []  Microbiology  []  Radiology  []  EKG/Telemetry   []  Cardiology/Vascular   []  Pathology  []  Old records  []  Other:    Most notable findings include:     Lab Results (last 24 hours)       Procedure Component Value Units Date/Time    Urine Culture - Urine, Urine, Clean Catch [[id number]] Collected: 05/25/25 1122    Specimen: Urine, Clean Catch Updated: 05/26/25 1207     Urine Culture 50,000 CFU/mL Mixed Angelia Isolated    Narrative:      Specimen contains mixed organisms of questionable pathogenicity suggestive of contamination. If symptoms persist, suggest recollection.  Colonization of the urinary tract without infection is common. Treatment is discouraged unless the patient is symptomatic, pregnant, or undergoing an invasive urologic procedure.    Phosphorus [[id number]]  (Normal) Collected: 05/26/25 0543    Specimen: Blood Updated: 05/26/25 0631     Phosphorus 2.6 mg/dL     High Sensitivity Troponin T [[id number]]  (Abnormal) Collected: 05/26/25 0543    Specimen: Blood Updated: 05/26/25 0631     HS Troponin T 607 ng/L     Narrative:      High Sensitive Troponin T Reference Range:  <14.0 ng/L- Negative Female for AMI  <22.0 ng/L- Negative Male for  AMI  >=14 - Abnormal Female indicating possible myocardial injury.  >=22 - Abnormal Male indicating possible myocardial injury.   Clinicians would have to utilize clinical acumen, EKG, Troponin, and serial changes to determine if it is an Acute Myocardial Infarction or myocardial injury due to an underlying chronic condition.         Magnesium [[id number]]  (Normal) Collected: 05/26/25 0543    Specimen: Blood Updated: 05/26/25 0624     Magnesium 2.2 mg/dL     Comprehensive Metabolic Panel [[id number]]  (Abnormal) Collected: 05/26/25 0543    Specimen: Blood Updated: 05/26/25 0624     Glucose 110 mg/dL      BUN 16 mg/dL      Creatinine 1.17 mg/dL      Sodium 141 mmol/L      Potassium 4.2 mmol/L      Chloride 104 mmol/L      CO2 26.1 mmol/L      Calcium 8.9 mg/dL      Total Protein 6.2 g/dL      Albumin 3.8 g/dL      ALT (SGPT) 40 U/L      AST (SGOT) 86 U/L      Alkaline Phosphatase 89 U/L      Total Bilirubin 0.5 mg/dL      Globulin 2.4 gm/dL      A/G Ratio 1.6 g/dL      BUN/Creatinine Ratio 13.7     Anion Gap 10.9 mmol/L      eGFR 44.1 mL/min/1.73     Narrative:      GFR Categories in Chronic Kidney Disease (CKD)              GFR Category          GFR (mL/min/1.73)    Interpretation  G1                    90 or greater        Normal or high (1)  G2                    60-89                Mild decrease (1)  G3a                   45-59                Mild to moderate decrease  G3b                   30-44                Moderate to severe decrease  G4                    15-29                Severe decrease  G5                    14 or less           Kidney failure    (1)In the absence of evidence of kidney disease, neither GFR category G1 or G2 fulfill the criteria for CKD.    eGFR calculation 2021 CKD-EPI creatinine equation, which does not include race as a factor    CBC & Differential [[id number]]  (Abnormal) Collected: 05/26/25 0543    Specimen: Blood Updated: 05/26/25 0554    Narrative:      The following orders were  created for panel order CBC & Differential.  Procedure                               Abnormality         Status                     ---------                               -----------         ------                     CBC Auto Differential[[id number]]        Abnormal            Final result                 Please view results for these tests on the individual orders.    CBC Auto Differential [[id number]]  (Abnormal) Collected: 05/26/25 0543    Specimen: Blood Updated: 05/26/25 0554     WBC 9.08 10*3/mm3      RBC 3.33 10*6/mm3      Hemoglobin 10.1 g/dL      Hematocrit 31.5 %      MCV 94.6 fL      MCH 30.3 pg      MCHC 32.1 g/dL      RDW 13.2 %      RDW-SD 45.1 fl      MPV 11.7 fL      Platelets 153 10*3/mm3      Neutrophil % 68.9 %      Lymphocyte % 20.8 %      Monocyte % 7.4 %      Eosinophil % 2.3 %      Basophil % 0.3 %      Immature Grans % 0.3 %      Neutrophils, Absolute 6.25 10*3/mm3      Lymphocytes, Absolute 1.89 10*3/mm3      Monocytes, Absolute 0.67 10*3/mm3      Eosinophils, Absolute 0.21 10*3/mm3      Basophils, Absolute 0.03 10*3/mm3      Immature Grans, Absolute 0.03 10*3/mm3      nRBC 0.0 /100 WBC     D-dimer, Quantitative [[id number]]  (Abnormal) Collected: 05/25/25 0947    Specimen: Blood from Arm, Right Updated: 05/25/25 1540     D-Dimer, Quantitative 14.68 MCGFEU/mL     Narrative:      According to the assay 's published package insert, a normal (<0.50 MCGFEU/mL) D-dimer result in conjunction with a non-high clinical probability assessment, excludes deep vein thrombosis (DVT) and pulmonary embolism (PE) with high sensitivity.    D-dimer values increase with age and this can make VTE exclusion of an older population difficult. To address this, the [employer], based on best available evidence and recent guidelines, recommends that clinicians use age-adjusted D-dimer thresholds in patients greater than 50 years of age with: a) a low probability of PE who do not meet all  "Pulmonary Embolism Rule Out Criteria, or b) in those with intermediate probability of PE.   The formula for an age-adjusted D-dimer cut-off is \"age/100\".  For example, a 60 year old patient would have an age-adjusted cut-off of 0.60 MCGFEU/mL and an 80 year old 0.80 MCGFEU/mL.    High Sensitivity Troponin T [[id number]]  (Abnormal) Collected: 05/25/25 1454    Specimen: Blood Updated: 05/25/25 1534     HS Troponin T 394 ng/L     Narrative:      High Sensitive Troponin T Reference Range:  <14.0 ng/L- Negative Female for AMI  <22.0 ng/L- Negative Male for AMI  >=14 - Abnormal Female indicating possible myocardial injury.  >=22 - Abnormal Male indicating possible myocardial injury.   Clinicians would have to utilize clinical acumen, EKG, Troponin, and serial changes to determine if it is an Acute Myocardial Infarction or myocardial injury due to an underlying chronic condition.                 Imaging Results (Last 24 Hours)       Procedure Component Value Units Date/Time    CT Angiogram Chest Pulmonary Embolism [[id number]] Collected: 05/25/25 1602     Updated: 05/25/25 1607    Narrative:      CT ANGIOGRAM CHEST PULMONARY EMBOLISM    Date of Exam: 5/25/2025 3:40 PM EDT    Indication: elevated ddimer at OSH.    Comparison: Chest radiograph 1/7/2024.    Technique: Axial CT images were obtained of the chest after the uneventful intravenous administration of iodinated contrast utilizing pulmonary embolism protocol.  In addition, a 3-D volume rendered image was created for interpretation.  Sagittal and   coronal reconstructions were performed.  Automated exposure control and iterative reconstruction methods were used.      Findings:    Thyroid and thoracic inlet: No significant abnormality.    Lymph nodes: No pathologic appearing lymph nodes by imaging criteria.    Cardiovascular: Cardiomegaly. No pericardial effusion. Aorta and main pulmonary artery diameters are within normal range.. Coronary artery calcifications. Status " post CABG. Aortic atherosclerosis.. No evidence of pulmonary embolism.    Esophagus: Small hiatal hernia.    Lung parenchyma: Scattered atelectasis including in the lower lobes and lingula. Mild interlobular septal thickening and bilateral hazy opacification.    Airways: Patent trachea and mainstem bronchi.    Pleura: Small bilateral pleural effusions. No pneumothorax.    Chest wall and osseous structures: Degenerative changes of the imaged spine. No acute osseous abnormality.    Included abdomen: Cholelithiasis. Right adrenal hypoattenuating lesion measuring 14 mm, likely an adenoma.      Impression:      Impression:  No evidence of pulmonary embolism.    Cardiomegaly and mild pulmonary edema with small bilateral pleural effusions.      Electronically Signed: Arnaldo Napier MD    5/25/2025 4:05 PM EDT    Workstation ID: [id number]            LAB RESULTS (LAST 7 DAYS)    CBC  Results from last 7 days   Lab Units 05/26/25  0543 05/25/25  0947   WBC 10*3/mm3 9.08 8.57   RBC 10*6/mm3 3.33* 3.49*   HEMOGLOBIN g/dL 10.1* 10.6*   HEMATOCRIT % 31.5* 33.2*   MCV fL 94.6 95.1   PLATELETS 10*3/mm3 153 157       BMP  Results from last 7 days   Lab Units 05/26/25  0543 05/25/25  0947   SODIUM mmol/L 141 139   POTASSIUM mmol/L 4.2 4.6   CHLORIDE mmol/L 104 104   CO2 mmol/L 26.1 24.8   BUN mg/dL 16 21   CREATININE mg/dL 1.17* 1.29*   GLUCOSE mg/dL 110* 128*   MAGNESIUM mg/dL 2.2 1.8   PHOSPHORUS mg/dL 2.6 3.6       CMP   Results from last 7 days   Lab Units 05/26/25  0543 05/25/25  0947   SODIUM mmol/L 141 139   POTASSIUM mmol/L 4.2 4.6   CHLORIDE mmol/L 104 104   CO2 mmol/L 26.1 24.8   BUN mg/dL 16 21   CREATININE mg/dL 1.17* 1.29*   GLUCOSE mg/dL 110* 128*   ALBUMIN g/dL 3.8  --    BILIRUBIN mg/dL 0.5  --    ALK PHOS U/L 89  --    AST (SGOT) U/L 86*  --    ALT (SGPT) U/L 40*  --        BNP        TROPONIN  Results from last 7 days   Lab Units 05/26/25  0543   HSTROP T ng/L 607*       CoAg  Results from last 7 days   Lab Units  05/25/25  0947   INR  1.21*       Creatinine Clearance  Estimated Creatinine Clearance: 26.6 mL/min (A) (by C-G formula based on SCr of 1.17 mg/dL (H)).    ABG          Radiology  CT Angiogram Chest Pulmonary Embolism  Result Date: 5/25/2025  Impression: No evidence of pulmonary embolism. Cardiomegaly and mild pulmonary edema with small bilateral pleural effusions. Electronically Signed: Arnaldo Napier MD  5/25/2025 4:05 PM EDT  Workstation ID: [id number]        EKG  I personally viewed and interpreted the patient's EKG/Telemetry data:  ECG 12 Lead Rhythm Change   Preliminary Result   HEART KNGI=401  bpm   RR Sicciefi=037  ms   MD Interval=  ms   P Horizontal Axis=  deg   P Front Axis=  deg   QRSD Qinxoqkx=714  ms   QT Dyqqylpv=684  ms   SQfS=875  ms   QRS Axis=46  deg   T Wave Axis=-47  deg   - ABNORMAL ECG -   Atrial flutter with predominant 3:1 AV block   Right bundle branch block   Date and Time of Study:2025-05-26 04:22:53      ECG 12 Lead Rhythm Change   Final Result   HEART TOIA=439  bpm   RR Xvidvmhb=217  ms   MD Interval=  ms   P Horizontal Axis=  deg   P Front Axis=  deg   QRSD Anwvixln=373  ms   QT Psjjlztn=707  ms   GInJ=674  ms   QRS Axis=47  deg   T Wave Axis=-11  deg   - ABNORMAL ECG -   Atrial fibrillation   Right bundle branch block   Prolonged QT interval   When compared with ECG of 07-Jan-2024 09:16:35,   Significant change in rhythm: previously sinus   Electronically Signed By: Ramiro Caraballo (SELVIN) 2025-05-25 21:49:31   Date and Time of Study:2025-05-25 10:12:00      Telemetry Scan   Final Result      Telemetry Scan   Final Result      Telemetry Scan   Final Result      Telemetry Scan   Final Result      Telemetry Scan   Final Result      Telemetry Scan   Final Result            Echocardiogram:    Results for orders placed during the hospital encounter of 05/25/25    Adult Transthoracic Echo Complete w/ Color, Spectral and Contrast if necessary per protocol    Interpretation Summary    Left  ventricular ejection fraction appears to be 56 - 60%.    Left ventricular diastolic function is consistent with (grade III w/high LAP) reversible restrictive pattern.    The left atrial cavity is moderately dilated.    Moderate to severe mitral valve regurgitation is present.    Moderate tricuspid valve regurgitation is present.    Estimated right ventricular systolic pressure from tricuspid regurgitation is moderately elevated (45-55 mmHg).        Stress Test:        Cardiac Catheterization:  No results found for this or any previous visit.        Other:      ASSESSMENT & PLAN:    Principal Problem:    Paroxysmal atrial fibrillation with rapid ventricular response  Active Problems:    Coronary artery disease involving native coronary artery of native heart without angina pectoris    Mixed hyperlipidemia    Benign essential HTN    Paroxysmal SVT (supraventricular tachycardia)    A-fib    A-fib RVR  [id number] score is 5  Start heparin drip with plans to switch to oral after invasive procedures have been completed  Toprol-XL for rate control  Wean off Cardizem drip  Recommend amiodarone bolus and drip     Non-ST elevation MI  History of coronary artery disease with CABG in 2009, unknown anatomy  High-sensitivity troponin up to 600  Described risk and benefits of cardiac catheterization/bypass angiography  Risk and benefits of PCI discussed with the patient  Continue aspirin, high intensity statin and beta-blocker.    Addendum  Cardiac catheterization shows obstructive native three-vessel disease with patent 3 out of 3 bypass grafts.  Type II MI due to demand ischemia in the setting of A-fib RVR and HFpEF exacerbation.  Recommend diuresis, amiodarone, metoprolol  EVERARDO to evaluate mitral and tricuspid valve +/-cardioversion.    HFpEF  Mitral regurgitation  proBNP 3000  HFpEF secondary to atrial fibrillation, coronary artery disease, mitral regurgitation  Echocardiogram shows preserved LV function, grade 3 diastolic  dysfunction and severe mitral regurgitation with pulmonary hypertension  Diuretics based on cardiac catheterization and volume assessment  Monitor I's and O's and replace electrolytes as needed  Consider adding Jardiance  MR is also worsening atrial fibrillation  Recommend elective EVERARDO to further evaluate mitral regurgitation when she recovers from this episode.    Hypertension  Blood pressures currently normal  Continue beta-blocker    Diabetes  A1c 6.24  Insulin sliding scale during inpatient stay    Hyperlipidemia  Goal LDL less than 55  Continue high intensity statin    AUGUSTO  Renal function is improving  Closely monitor renal function while on diuretics    Transaminitis  AST/ALT 86/40  May be secondary to congestion      Win Garzon MD  05/26/25  12:26 EDT

## 2025-05-26 NOTE — PROGRESS NOTES
[facility] MEDICINE SERVICE  DAILY PROGRESS NOTE    NAME: Amanda Brown  : 6/10/1933  MRN: [id number]      LOS: 0 days     PROVIDER OF SERVICE: Princess Kim MD    Chief Complaint: Paroxysmal atrial fibrillation with rapid ventricular response    Subjective:     Interval History:  History taken from: patient    No new complaint    Review of Systems:   Review of Systems   All other systems reviewed and are negative.      Objective:     Vital Signs  Temp:  [97.4 °F (36.3 °C)-98.2 °F (36.8 °C)] 98.2 °F (36.8 °C)  Heart Rate:  [] 116  Resp:  [16-18] 16  BP: (106-163)/() 130/70   Body mass index is 26.24 kg/m².    Physical Exam  Physical Exam  Constitutional:       Appearance: Normal appearance.   HENT:      Head: Normocephalic and atraumatic.      Nose: Nose normal.      Mouth/Throat:      Mouth: Mucous membranes are moist.   Eyes:      Extraocular Movements: Extraocular movements intact.      Pupils: Pupils are equal, round, and reactive to light.   Cardiovascular:      Rate and Rhythm: Normal rate and regular rhythm.   Pulmonary:      Effort: Pulmonary effort is normal.      Breath sounds: Normal breath sounds.   Abdominal:      General: Abdomen is flat. Bowel sounds are normal.      Palpations: Abdomen is soft.   Musculoskeletal:         General: Normal range of motion.      Cervical back: Normal range of motion and neck supple.   Skin:     General: Skin is warm and dry.   Neurological:      General: No focal deficit present.      Mental Status: She is alert and oriented to person, place, and time.   Psychiatric:         Mood and Affect: Mood normal.         Behavior: Behavior normal.         Thought Content: Thought content normal.         Judgment: Judgment normal.         Current Medications:  Scheduled Meds:acetylcysteine, 600 mg, Oral, Q12H  aspirin, 81 mg, Oral, Daily  atorvastatin, 40 mg, Oral, Daily  cefTRIAXone, 1,000 mg, Intravenous, Q24H  [Held by provider] enoxaparin  sodium, 1 mg/kg, Subcutaneous, Q24H  [Held by provider] hydroCHLOROthiazide, 25 mg, Oral, Daily  [Held by provider] lisinopril, 20 mg, Oral, BID  metoprolol succinate XL, 25 mg, Oral, BID  timolol, 1 drop, Both Eyes, Daily      Continuous Infusions:dilTIAZem, 5-15 mg/hr, Last Rate: 5 mg/hr (05/26/25 0656)  sodium chloride, 75 mL/hr, Last Rate: 75 mL/hr (05/26/25 1100)      PRN Meds:.  senna-docusate sodium **AND** polyethylene glycol **AND** bisacodyl **AND** bisacodyl    Calcium Replacement - Follow Nurse / BPA Driven Protocol    Magnesium Cardiology Dose Replacement - Follow Nurse / BPA Driven Protocol    nitroglycerin    Phosphorus Replacement - Follow Nurse / BPA Driven Protocol    Potassium Replacement - Follow Nurse / BPA Driven Protocol       Diagnostic Data    Results from last 7 days   Lab Units 05/26/25  0543   WBC 10*3/mm3 9.08   HEMOGLOBIN g/dL 10.1*   HEMATOCRIT % 31.5*   PLATELETS 10*3/mm3 153   GLUCOSE mg/dL 110*   CREATININE mg/dL 1.17*   BUN mg/dL 16   SODIUM mmol/L 141   POTASSIUM mmol/L 4.2   AST (SGOT) U/L 86*   ALT (SGPT) U/L 40*   ALK PHOS U/L 89   BILIRUBIN mg/dL 0.5   ANION GAP mmol/L 10.9       CT Angiogram Chest Pulmonary Embolism  Result Date: 5/25/2025  Impression: No evidence of pulmonary embolism. Cardiomegaly and mild pulmonary edema with small bilateral pleural effusions. Electronically Signed: Arnaldo Napier MD  5/25/2025 4:05 PM EDT  Workstation ID: [id number]        I reviewed the patient's new clinical results.    Assessment/Plan:     Active and Resolved Problems  Active Hospital Problems    Diagnosis  POA    **Paroxysmal atrial fibrillation with rapid ventricular response [I48.0]  Yes    A-fib [I48.91]  Yes    Paroxysmal SVT (supraventricular tachycardia) [I47.10]  Yes    Benign essential HTN [I10]  Yes    Coronary artery disease involving native coronary artery of native heart without angina pectoris [I25.10]  Yes    Mixed hyperlipidemia [E78.2]  Yes      Resolved Hospital  Problems   No resolved problems to display.       New onset Atrial Fibrillation with RVR  Hx SVT  Hx HTN  Hx HLD, s/p CABG  -Currently on a Cardizem drip for A-fib with RVR.  Patient has a history of paroxysmal SVT status post prior cardioversion.  Continue Cardizem drip.  On Lovenox for anticoagulation due to [id number] score of 5.  Follow-up on TSH and echo.     Abnormal UA  -urine culture grew mixed vini suggestive of contamination.  Has been on ceftriaxone.     [place]  -Renal function is improving.  Continue to monitor.    VTE Prophylaxis:  Pharmacologic & mechanical VTE prophylaxis orders are present.    Disposition Planning:     Barriers to Discharge: Pending clinical improvement  Anticipated Date of Discharge: 6/1/2025  Place of Discharge: Home      Code Status and Medical Interventions: CPR (Attempt to Resuscitate); Full Support   Ordered at: 05/25/25 1325     Code Status (Patient has no pulse and is not breathing):    CPR (Attempt to Resuscitate)     Medical Interventions (Patient has pulse or is breathing):    Full Support       Signature: Electronically signed by Princess Kim MD, 05/26/25, 12:18 EDT.  [facility] Hospitalist Team

## 2025-05-26 NOTE — PROGRESS NOTES
Referring Provider: Princess Kim MD    Reason for follow-up:  Unstable angina  Atrial fibrillation with RVR  Non-STEMI     Patient Care Team:  Ranulfo Mims MD as PCP - General (Internal Medicine)  Deep Aceves MD as Consulting Physician (Cardiology)    Subjective .      ROS    Today, the patient has been without any chest discomfort ,shortness of breath, palpitations, dizziness or syncope.  Denies having any headache ,abdominal pain ,nausea, vomiting , diarrhea constipation, loss of weight or loss of appetite.  Denies having any excessive bruising ,hematuria or blood in the stool.    Review of all systems negative except as indicated.    Reviewed ROS.  History  Past Medical History:   Diagnosis Date    Benign essential HTN 11/30/2021    Closed fracture of surgical neck of humerus 02/21/2022    Closed supracondylar fracture of left humerus 11/30/2021    Coronary artery disease involving native coronary artery of native heart without angina pectoris 03/27/2016    Fracture, humerus 2021    left    GERD (gastroesophageal reflux disease)     Mixed hyperlipidemia 09/27/2015    Osteoarthritis 11/30/2021    Paroxysmal SVT (supraventricular tachycardia) 01/07/2024    Pneumonia due to infectious organism 01/07/2024    PONV (postoperative nausea and vomiting)        Past Surgical History:   Procedure Laterality Date    ARTERIAL BYPASS SURGERY      CARDIAC CATHETERIZATION      CARDIAC SURGERY  2009    CABG 4V    CORONARY ARTERY BYPASS GRAFT         Family History   Problem Relation Age of Onset    Lymphoma Mother     Stroke Mother     Asthma Father        Social History     Tobacco Use    Smoking status: Never     Passive exposure: Never    Smokeless tobacco: Never   Vaping Use    Vaping status: Never Used   Substance Use Topics    Alcohol use: Never    Drug use: Never        Medications Prior to Admission   Medication Sig Dispense Refill Last Dose/Taking    aspirin 81 MG EC tablet Take 1 tablet by mouth  Daily. LD 11/29 5/24/2025    atorvastatin (LIPITOR) 40 MG tablet Take 1 tablet by mouth Daily.   5/24/2025    B Complex Vitamins (VITAMIN B COMPLEX PO) Take  by mouth 2 (Two) Times a Week.   Past Week    hydroCHLOROthiazide (HYDRODIURIL) 25 MG tablet Take 1 tablet by mouth Daily.   5/24/2025    lisinopril (PRINIVIL,ZESTRIL) 20 MG tablet Take 1 tablet by mouth 2 (Two) Times a Day.   5/24/2025    Lumigan 0.01 % ophthalmic drops Administer 1 drop to both eyes Every Night.   5/24/2025    meclizine (ANTIVERT) 25 MG tablet Take 1 tablet by mouth Daily.   5/24/2025    metoprolol succinate XL (TOPROL-XL) 25 MG 24 hr tablet Take 1 tablet by mouth 2 (Two) Times a Day. 180 tablet 3 5/24/2025    omeprazole (priLOSEC) 20 MG capsule Take 1 capsule by mouth 4 (Four) Times a Week.   Past Week    timolol (TIMOPTIC) 0.5 % ophthalmic solution timolol maleate 0.5 % eye drops   5/24/2025       Allergies  Patient has no known allergies.    Scheduled Meds:acetylcysteine, 600 mg, Oral, Q12H  aspirin, 81 mg, Oral, Daily  atorvastatin, 40 mg, Oral, Daily  cefTRIAXone, 1,000 mg, Intravenous, Q24H  enoxaparin sodium, 1 mg/kg, Subcutaneous, Q24H  [Held by provider] hydroCHLOROthiazide, 25 mg, Oral, Daily  [Held by provider] lisinopril, 20 mg, Oral, BID  metoprolol succinate XL, 25 mg, Oral, BID  timolol, 1 drop, Both Eyes, Daily      Continuous Infusions:dilTIAZem, 5-15 mg/hr, Last Rate: 5 mg/hr (05/25/25 5731)      PRN Meds:.  senna-docusate sodium **AND** polyethylene glycol **AND** bisacodyl **AND** bisacodyl    Calcium Replacement - Follow Nurse / BPA Driven Protocol    Magnesium Cardiology Dose Replacement - Follow Nurse / BPA Driven Protocol    nitroglycerin    Phosphorus Replacement - Follow Nurse / BPA Driven Protocol    Potassium Replacement - Follow Nurse / BPA Driven Protocol    Objective     VITAL SIGNS  Vitals:    05/26/25 0330 05/26/25 0400 05/26/25 0430 05/26/25 0500   BP: 120/67 114/43 127/62 130/64   Pulse: 117 103 112 120  "  Resp:       Temp:       TempSrc:       SpO2: 94% 94% 93% 92%   Weight:       Height:           Flowsheet Rows      Flowsheet Row First Filed Value   Admission Height 154.9 cm (61\") Documented at 05/25/2025 0840   Admission Weight 63 kg (138 lb 14.2 oz) Documented at 05/25/2025 0840              Intake/Output Summary (Last 24 hours) at 5/26/2025 0553  Last data filed at 5/25/2025 1625  Gross per 24 hour   Intake 730 ml   Output --   Net 730 ml        TELEMETRY: Atrial fibrillation.    Physical Exam:  The patient is alert, oriented and in no distress.  Vital signs as noted above.  Head and neck revealed no carotid bruits or jugular venous distention.  No thyromegaly or lymphadenopathy is present  Lungs clear.  No wheezing.  Breath sounds are normal bilaterally.  Heart normal first and second heart sounds.  No murmur. No precordial rub is present.  No gallop is present.  Abdomen soft and nontender.  No organomegaly is present.  Extremities with good peripheral pulses without any pedal edema.  Skin warm and dry.  Musculoskeletal system is grossly normal  CNS grossly normal    Reviewed and updated.    Results Review:   I reviewed the patient's new clinical results.  Lab Results (last 24 hours)       Procedure Component Value Units Date/Time    CBC & Differential [[id number]] Collected: 05/26/25 0543    Specimen: Blood Updated: 05/26/25 0551    Narrative:      The following orders were created for panel order CBC & Differential.  Procedure                               Abnormality         Status                     ---------                               -----------         ------                     CBC Auto Differential[[id number]]                            In process                   Please view results for these tests on the individual orders.    CBC Auto Differential [[id number]] Collected: 05/26/25 0543    Specimen: Blood Updated: 05/26/25 0551    Magnesium [[id number]] Collected: 05/26/25 0543    Specimen: Blood " "Updated: 05/26/25 0551    Phosphorus [[id number]] Collected: 05/26/25 0543    Specimen: Blood Updated: 05/26/25 0551    High Sensitivity Troponin T [[id number]] Collected: 05/26/25 0543    Specimen: Blood Updated: 05/26/25 0551    Comprehensive Metabolic Panel [[id number]] Collected: 05/26/25 0543    Specimen: Blood Updated: 05/26/25 0551    D-dimer, Quantitative [[id number]]  (Abnormal) Collected: 05/25/25 0947    Specimen: Blood from Arm, Right Updated: 05/25/25 1540     D-Dimer, Quantitative 14.68 MCGFEU/mL     Narrative:      According to the assay 's published package insert, a normal (<0.50 MCGFEU/mL) D-dimer result in conjunction with a non-high clinical probability assessment, excludes deep vein thrombosis (DVT) and pulmonary embolism (PE) with high sensitivity.    D-dimer values increase with age and this can make VTE exclusion of an older population difficult. To address this, the [employer], based on best available evidence and recent guidelines, recommends that clinicians use age-adjusted D-dimer thresholds in patients greater than 50 years of age with: a) a low probability of PE who do not meet all Pulmonary Embolism Rule Out Criteria, or b) in those with intermediate probability of PE.   The formula for an age-adjusted D-dimer cut-off is \"age/100\".  For example, a 60 year old patient would have an age-adjusted cut-off of 0.60 MCGFEU/mL and an 80 year old 0.80 MCGFEU/mL.    High Sensitivity Troponin T [[id number]]  (Abnormal) Collected: 05/25/25 1454    Specimen: Blood Updated: 05/25/25 1534     HS Troponin T 394 ng/L     Narrative:      High Sensitive Troponin T Reference Range:  <14.0 ng/L- Negative Female for AMI  <22.0 ng/L- Negative Male for AMI  >=14 - Abnormal Female indicating possible myocardial injury.  >=22 - Abnormal Male indicating possible myocardial injury.   Clinicians would have to utilize clinical acumen, EKG, Troponin, and serial changes to determine if it " is an Acute Myocardial Infarction or myocardial injury due to an underlying chronic condition.         Urine Culture - Urine, Urine, Clean Catch [[id number]] Collected: 05/25/25 1122    Specimen: Urine, Clean Catch Updated: 05/25/25 1320    Urine Drug Screen - Urine, Clean Catch [[id number]]  (Normal) Collected: 05/25/25 1122    Specimen: Urine, Clean Catch Updated: 05/25/25 1153     THC, Screen, Urine Negative     Phencyclidine (PCP), Urine Negative     Cocaine Screen, Urine Negative     Methamphetamine, Ur Negative     Opiate Screen Negative     Amphetamine Screen, Urine Negative     Benzodiazepine Screen, Urine Negative     Tricyclic Antidepressants Screen Negative     Methadone Screen, Urine Negative     Barbiturates Screen, Urine Negative     Oxycodone Screen, Urine Negative     Buprenorphine, Screen, Urine Negative    Narrative:      Cutoff For Drugs Screened:    Amphetamines               500 ng/ml  Barbiturates               200 ng/ml  Benzodiazepines            150 ng/ml  Cocaine                    150 ng/ml  Methadone                  200 ng/ml  Opiates                    100 ng/ml  Phencyclidine               25 ng/ml  THC                         50 ng/ml  Methamphetamine            500 ng/ml  Tricyclic Antidepressants  300 ng/ml  Oxycodone                  100 ng/ml  Buprenorphine               10 ng/ml    The normal value for all drugs tested is negative. This report includes unconfirmed screening results, with the cutoff values listed, to be used for medical treatment purposes only.  Unconfirmed results must not be used for non-medical purposes such as employment or legal testing.  Clinical consideration should be applied to any drug of abuse test, particularly when unconfirmed results are used.    All urine drugs of abuse requests without chain of custody are for medical screening purposes only.  False positives are possible.      Urinalysis With Microscopic If Indicated (No Culture) - Urine, Clean  Catch [[id number]]  (Abnormal) Collected: 05/25/25 1122    Specimen: Urine, Clean Catch Updated: 05/25/25 1146     Color, UA Yellow     Appearance, UA Clear     pH, UA <=5.0     Specific Gravity, UA 1.023     Glucose, UA Negative     Ketones, UA Trace     Bilirubin, UA Negative     Blood, UA Small (1+)     Protein, UA Trace     Leuk Esterase, UA Small (1+)     Nitrite, UA Negative     Urobilinogen, UA 1.0 E.U./dL    Urinalysis, Microscopic Only - Urine, Clean Catch [[id number]]  (Abnormal) Collected: 05/25/25 1122    Specimen: Urine, Clean Catch Updated: 05/25/25 1146     RBC, UA 6-10 /HPF      WBC, UA 3-5 /HPF      Bacteria, UA Trace /HPF      Squamous Epithelial Cells, UA 0-2 /HPF      Hyaline Casts, UA 3-6 /LPF      Methodology Automated Microscopy    High Sensitivity Troponin T 1Hr [[id number]]  (Abnormal) Collected: 05/25/25 1104    Specimen: Blood from Arm, Right Updated: 05/25/25 1138     HS Troponin T 122 ng/L      Troponin T Numeric Delta 46 ng/L      Troponin T % Delta 61    Narrative:      High Sensitive Troponin T Reference Range:  <14.0 ng/L- Negative Female for AMI  <22.0 ng/L- Negative Male for AMI  >=14 - Abnormal Female indicating possible myocardial injury.  >=22 - Abnormal Male indicating possible myocardial injury.   Clinicians would have to utilize clinical acumen, EKG, Troponin, and serial changes to determine if it is an Acute Myocardial Infarction or myocardial injury due to an underlying chronic condition.         T4, Free [[id number]]  (Normal) Collected: 05/25/25 0947    Specimen: Blood from Arm, Right Updated: 05/25/25 1108     Free T4 1.06 ng/dL     Respiratory Panel PCR w/COVID-19(SARS-CoV-2) SUSAN/JOSUÉ/SELVIN/PAD/COR/DIANE In-House, NP Swab in UTM/VTM, 2 HR TAT - Swab, Nasopharynx [[id number]]  (Normal) Collected: 05/25/25 0947    Specimen: Swab from Nasopharynx Updated: 05/25/25 1051     ADENOVIRUS, PCR Not Detected     Coronavirus 229E Not Detected     Coronavirus HKU1 Not Detected      Coronavirus NL63 Not Detected     Coronavirus OC43 Not Detected     COVID19 Not Detected     Human Metapneumovirus Not Detected     Human Rhinovirus/Enterovirus Not Detected     Influenza A PCR Not Detected     Influenza B PCR Not Detected     Parainfluenza Virus 1 Not Detected     Parainfluenza Virus 2 Not Detected     Parainfluenza Virus 3 Not Detected     Parainfluenza Virus 4 Not Detected     RSV, PCR Not Detected     Bordetella pertussis pcr Not Detected     Bordetella parapertussis PCR Not Detected     Chlamydophila pneumoniae PCR Not Detected     Mycoplasma pneumo by PCR Not Detected    Narrative:      In the setting of a positive respiratory panel with a viral infection PLUS a negative procalcitonin without other underlying concern for bacterial infection, consider observing off antibiotics or discontinuation of antibiotics and continue supportive care. If the respiratory panel is positive for atypical bacterial infection (Bordetella pertussis, Chlamydophila pneumoniae, or Mycoplasma pneumoniae), consider antibiotic de-escalation to target atypical bacterial infection.    High Sensitivity Troponin T [[id number]]  (Abnormal) Collected: 05/25/25 0947    Specimen: Blood from Arm, Right Updated: 05/25/25 1047     HS Troponin T 76 ng/L     Narrative:      High Sensitive Troponin T Reference Range:  <14.0 ng/L- Negative Female for AMI  <22.0 ng/L- Negative Male for AMI  >=14 - Abnormal Female indicating possible myocardial injury.  >=22 - Abnormal Male indicating possible myocardial injury.   Clinicians would have to utilize clinical acumen, EKG, Troponin, and serial changes to determine if it is an Acute Myocardial Infarction or myocardial injury due to an underlying chronic condition.         BNP [[id number]]  (Abnormal) Collected: 05/25/25 0947    Specimen: Blood from Arm, Right Updated: 05/25/25 1040     proBNP 3,092.0 pg/mL     Narrative:      This assay is used as an aid in the diagnosis of individuals  suspected of having heart failure. It can be used as an aid in the diagnosis of acute decompensated heart failure (ADHF) in patients presenting with signs and symptoms of ADHF to the emergency department (ED). In addition, NT-proBNP of <300 pg/mL indicates ADHF is not likely.    Age Range Result Interpretation  NT-proBNP Concentration (pg/mL:      <50             Positive            >450                   Gray                 300-450                    Negative             <300    50-75           Positive            >900                  Gray                300-900                  Negative            <300      >75             Positive            >1800                  Gray                300-1800                  Negative            <300    Lipid Panel [[id number]] Collected: 05/25/25 0947    Specimen: Blood from Arm, Right Updated: 05/25/25 1040     Total Cholesterol 139 mg/dL      Triglycerides 61 mg/dL      HDL Cholesterol 45 mg/dL      LDL Cholesterol  81 mg/dL      VLDL Cholesterol 13 mg/dL      LDL/HDL Ratio 1.82    Narrative:      Cholesterol Reference Ranges  (U.S. Department of Health and Human Services ATP III Classifications)    Desirable          <200 mg/dL  Borderline High    200-239 mg/dL  High Risk          >240 mg/dL      Triglyceride Reference Ranges  (U.S. Department of Health and Human Services ATP III Classifications)    Normal           <150 mg/dL  Borderline High  150-199 mg/dL  High             200-499 mg/dL  Very High        >500 mg/dL    HDL Reference Ranges  (U.S. Department of Health and Human Services ATP III Classifications)    Low     <40 mg/dl (major risk factor for CHD)  High    >60 mg/dl ('negative' risk factor for CHD)        LDL Reference Ranges  (U.S. Department of Health and Human Services ATP III Classifications)    Optimal          <100 mg/dL  Near Optimal     100-129 mg/dL  Borderline High  130-159 mg/dL  High             160-189 mg/dL  Very High        >189 mg/dL    LDL is  calculated using the NIH LDL-C calculation.      TSH Rfx On Abnormal To Free T4 [[id number]]  (Abnormal) Collected: 05/25/25 0947    Specimen: Blood from Arm, Right Updated: 05/25/25 1040     TSH 4.390 uIU/mL     Basic Metabolic Panel [[id number]]  (Abnormal) Collected: 05/25/25 0947    Specimen: Blood from Arm, Right Updated: 05/25/25 1040     Glucose 128 mg/dL      BUN 21 mg/dL      Creatinine 1.29 mg/dL      Sodium 139 mmol/L      Potassium 4.6 mmol/L      Chloride 104 mmol/L      CO2 24.8 mmol/L      Calcium 9.0 mg/dL      BUN/Creatinine Ratio 16.3     Anion Gap 10.2 mmol/L      eGFR 39.3 mL/min/1.73     Narrative:      GFR Categories in Chronic Kidney Disease (CKD)              GFR Category          GFR (mL/min/1.73)    Interpretation  G1                    90 or greater        Normal or high (1)  G2                    60-89                Mild decrease (1)  G3a                   45-59                Mild to moderate decrease  G3b                   30-44                Moderate to severe decrease  G4                    15-29                Severe decrease  G5                    14 or less           Kidney failure    (1)In the absence of evidence of kidney disease, neither GFR category G1 or G2 fulfill the criteria for CKD.    eGFR calculation 2021 CKD-EPI creatinine equation, which does not include race as a factor    Magnesium [[id number]]  (Normal) Collected: 05/25/25 0947    Specimen: Blood from Arm, Right Updated: 05/25/25 1040     Magnesium 1.8 mg/dL     Phosphorus [[id number]]  (Normal) Collected: 05/25/25 0947    Specimen: Blood from Arm, Right Updated: 05/25/25 1040     Phosphorus 3.6 mg/dL     Hemoglobin A1c [[id number]]  (Abnormal) Collected: 05/25/25 0947    Specimen: Blood from Arm, Right Updated: 05/25/25 1033     Hemoglobin A1C 6.24 %     Narrative:      Hemoglobin A1C Ranges:    Increased Risk for Diabetes  5.7% to 6.4%  Diabetes                     >= 6.5%  Diabetic Goal                < 7.0%     Protime-INR [[id number]]  (Abnormal) Collected: 05/25/25 0947    Specimen: Blood from Arm, Right Updated: 05/25/25 1026     Protime 15.2 Seconds      INR 1.21    CBC & Differential [[id number]]  (Abnormal) Collected: 05/25/25 0947    Specimen: Blood from Arm, Right Updated: 05/25/25 1005    Narrative:      The following orders were created for panel order CBC & Differential.  Procedure                               Abnormality         Status                     ---------                               -----------         ------                     CBC Auto Differential[[id number]]        Abnormal            Final result                 Please view results for these tests on the individual orders.    CBC Auto Differential [[id number]]  (Abnormal) Collected: 05/25/25 0947    Specimen: Blood from Arm, Right Updated: 05/25/25 1005     WBC 8.57 10*3/mm3      RBC 3.49 10*6/mm3      Hemoglobin 10.6 g/dL      Hematocrit 33.2 %      MCV 95.1 fL      MCH 30.4 pg      MCHC 31.9 g/dL      RDW 13.2 %      RDW-SD 45.4 fl      MPV 12.0 fL      Platelets 157 10*3/mm3      Neutrophil % 76.6 %      Lymphocyte % 18.1 %      Monocyte % 4.1 %      Eosinophil % 0.6 %      Basophil % 0.2 %      Immature Grans % 0.4 %      Neutrophils, Absolute 6.57 10*3/mm3      Lymphocytes, Absolute 1.55 10*3/mm3      Monocytes, Absolute 0.35 10*3/mm3      Eosinophils, Absolute 0.05 10*3/mm3      Basophils, Absolute 0.02 10*3/mm3      Immature Grans, Absolute 0.03 10*3/mm3      nRBC 0.0 /100 WBC             Imaging Results (Last 24 Hours)       Procedure Component Value Units Date/Time    CT Angiogram Chest Pulmonary Embolism [[id number]] Collected: 05/25/25 1602     Updated: 05/25/25 1607    Narrative:      CT ANGIOGRAM CHEST PULMONARY EMBOLISM    Date of Exam: 5/25/2025 3:40 PM EDT    Indication: elevated ddimer at OSH.    Comparison: Chest radiograph 1/7/2024.    Technique: Axial CT images were obtained of the chest after the uneventful intravenous  administration of iodinated contrast utilizing pulmonary embolism protocol.  In addition, a 3-D volume rendered image was created for interpretation.  Sagittal and   coronal reconstructions were performed.  Automated exposure control and iterative reconstruction methods were used.      Findings:    Thyroid and thoracic inlet: No significant abnormality.    Lymph nodes: No pathologic appearing lymph nodes by imaging criteria.    Cardiovascular: Cardiomegaly. No pericardial effusion. Aorta and main pulmonary artery diameters are within normal range.. Coronary artery calcifications. Status post CABG. Aortic atherosclerosis.. No evidence of pulmonary embolism.    Esophagus: Small hiatal hernia.    Lung parenchyma: Scattered atelectasis including in the lower lobes and lingula. Mild interlobular septal thickening and bilateral hazy opacification.    Airways: Patent trachea and mainstem bronchi.    Pleura: Small bilateral pleural effusions. No pneumothorax.    Chest wall and osseous structures: Degenerative changes of the imaged spine. No acute osseous abnormality.    Included abdomen: Cholelithiasis. Right adrenal hypoattenuating lesion measuring 14 mm, likely an adenoma.      Impression:      Impression:  No evidence of pulmonary embolism.    Cardiomegaly and mild pulmonary edema with small bilateral pleural effusions.      Electronically Signed: Arnaldo Napier MD    5/25/2025 4:05 PM EDT    Workstation ID: [id number]        LAB RESULTS (LAST 7 DAYS)    CBC  Results from last 7 days   Lab Units 05/25/25  0947   WBC 10*3/mm3 8.57   RBC 10*6/mm3 3.49*   HEMOGLOBIN g/dL 10.6*   HEMATOCRIT % 33.2*   MCV fL 95.1   PLATELETS 10*3/mm3 157       BMP  Results from last 7 days   Lab Units 05/25/25  0947   SODIUM mmol/L 139   POTASSIUM mmol/L 4.6   CHLORIDE mmol/L 104   CO2 mmol/L 24.8   BUN mg/dL 21   CREATININE mg/dL 1.29*   GLUCOSE mg/dL 128*   MAGNESIUM mg/dL 1.8   PHOSPHORUS mg/dL 3.6       CMP   Results from last 7 days    Lab Units 05/25/25  0947   SODIUM mmol/L 139   POTASSIUM mmol/L 4.6   CHLORIDE mmol/L 104   CO2 mmol/L 24.8   BUN mg/dL 21   CREATININE mg/dL 1.29*   GLUCOSE mg/dL 128*         BNP        TROPONIN  Results from last 7 days   Lab Units 05/25/25  1454   HSTROP T ng/L 394*       CoAg  Results from last 7 days   Lab Units 05/25/25  0947   INR  1.21*       Creatinine Clearance  Estimated Creatinine Clearance: 24.2 mL/min (A) (by C-G formula based on SCr of 1.29 mg/dL (H)).    ABG        Radiology  CT Angiogram Chest Pulmonary Embolism  Result Date: 5/25/2025  Impression: No evidence of pulmonary embolism. Cardiomegaly and mild pulmonary edema with small bilateral pleural effusions. Electronically Signed: Arnaldo Napier MD  5/25/2025 4:05 PM EDT  Workstation ID: [id number]          EKG                    I personally viewed and interpreted the patient's EKG/Telemetry data:    ECHOCARDIOGRAM:    Results for orders placed during the hospital encounter of 05/25/25    Adult Transthoracic Echo Complete w/ Color, Spectral and Contrast if necessary per protocol    Interpretation Summary    Left ventricular ejection fraction appears to be 56 - 60%.    Left ventricular diastolic function is consistent with (grade III w/high LAP) reversible restrictive pattern.    The left atrial cavity is moderately dilated.    Moderate to severe mitral valve regurgitation is present.    Moderate tricuspid valve regurgitation is present.    Estimated right ventricular systolic pressure from tricuspid regurgitation is moderately elevated (45-55 mmHg).          STRESS TEST        Cardiolite (Tc-99m sestamibi) stress test    CARDIAC CATHETERIZATION  No results found for this or any previous visit.                OTHER:         Assessment & Plan     Principal Problem:    Paroxysmal atrial fibrillation with rapid ventricular response  Active Problems:    Coronary artery disease involving native coronary artery of native heart without angina  pectoris    Mixed hyperlipidemia    Benign essential HTN    Paroxysmal SVT (supraventricular tachycardia)      ASSESSMENT & PLAN:     Principal Problem:    Paroxysmal atrial fibrillation with rapid ventricular response  Active Problems:    Coronary artery disease involving native coronary artery of native heart without angina pectoris    Mixed hyperlipidemia    Benign essential HTN    Paroxysmal SVT (supraventricular tachycardia)        Paroxysmal atrial fibrillation with rapid ventricular response  The patient has known paroxysmal SVT and reports a history of previous cardioversion.  New onset atrial fibrillation.  She was given a Cardizem bolus at [facility], but became hypotensive and required IV fluids bolus.   Restart home dose of Toprol XL 25 mg twice daily.  She was given digoxin 250 mcg IV x 1 dose without much change.  Her blood pressure has improved, but heart rate remains elevated.  She will be started on a Cardizem drip without bolus.  Closely monitor blood pressure.  [id number] score is 5  She will be anticoagulation with Lovenox 1 mg/kg sq daily (renally adjusted).    consulted to check patient's cost for Eliquis 2.5 mg BID  Obtain echocardiogram   Check K, Mg, TSH     Coronary artery disease involving native coronary artery of native heart without angina pectoris  History of CABG  The patient initially presented to the ER with chest pain, but she now denies any pain.   Serial high sensitivity troponin 76, 122  proBNP 3,092  EKG without acute ischemia  Likely type II MI secondary to rapid a-fib  Trend troponin  Obtain echo  Continue aspirin, high intensity statin and beta blocker      Mixed hyperlipidemia  Continue atorvastatin     Benign essential HTN, chronic  Blood pressure is soft  Continue beta blocker for rate control  Hold lisinopril and HCTZ for now  Closely monitor blood pressure         Unstable angina  Non-STEMI  Elevated troponin level  76  122  394 607    Status post  CABG    Atrial fibrillation with RVR.  Patient is on intravenous Cardizem.  Rate is better controlled.    Past history of SVT.  Patient had cardioversion in the past.    Anticoagulation  Patient is on subcu Lovenox.  Hold dissipation of cardiac catheterization.    [id number] score of 5.    Echocardiogram 5/25/2025    Left ventricular ejection fraction appears to be 56 - 60%.    Left ventricular diastolic function is consistent with (grade III w/high LAP) reversible restrictive pattern.    The left atrial cavity is moderately dilated.    Moderate to severe mitral valve regurgitation is present.    Moderate tricuspid valve regurgitation is present.    Estimated right ventricular systolic pressure from tricuspid regurgitation is moderately elevated (45-55 mmHg).    Mild renal dysfunction  21/1.29: 5/25/2025.  16/1.17: 5/26/2025.  Intravenous fluids and Mucomyst.    I had a lengthy discussion with patient and patient's family regarding options at this time.  Cardiac catheterization and coronary arteriography was suggested.  Risks and benefits pros and cons of the procedure including infection bleeding blood clot heart attack stroke allergic reaction to the dye renal dysfunction etc. were discussed.  Patient will be given intravenous fluids  After lengthy thought family and patient has decided to pursue cardiac catheterization.  Discussed with Dr. Garzon for coordination of care.    Medications were reviewed and updated.  Further plan will depend on patient's progress.    Reviewed and updated 5/26/2025.    ]]]]]]]]]]]]]]]]]]]]]]]]]]]]    Ramiro Caraballo MD  05/26/25  05:53 EDT

## 2025-05-26 NOTE — PLAN OF CARE
Goal Outcome Evaluation:         Cardizem gtt infusing.  Heart rate controlled < 120.  She denies any pain or respiratory distress.  NPO since midnight for stress test this morning.

## 2025-05-26 NOTE — PLAN OF CARE
Outcome Evaluation: A&Ox4. Remains on room air. No c/o pain or discomfort. A-FIB. NS infusing at 75 mL/hr. Amio gtt infusing. Right groin site CDI. Standby assist. Able to make needs known. Call light within reach. VSS. Plan of care ongoing at this time.      Problem: Adult Inpatient Plan of Care  Goal: Plan of Care Review  Outcome: Progressing  Flowsheets (Taken 5/26/2025 1607)  Outcome Evaluation: A&Ox4. Remains on room air. No c/o pain or discomfort. A-FIB. NS infusing at 75 mL/hr. Amio gtt infusing. Right groin site CDI. Standby assist. Able to make needs known. Call light within reach. VSS. Plan of care ongoing at this time.  Goal: Patient-Specific Goal (Individualized)  Outcome: Progressing  Goal: Absence of Hospital-Acquired Illness or Injury  Outcome: Progressing  Intervention: Identify and Manage Fall Risk  Recent Flowsheet Documentation  Taken 5/26/2025 1500 by Silvestre, Nicolle, LPN  Safety Promotion/Fall Prevention:   safety round/check completed   room organization consistent   nonskid shoes/slippers when out of bed   fall prevention program maintained   clutter free environment maintained   assistive device/personal items within reach  Intervention: Prevent Skin Injury  Recent Flowsheet Documentation  Taken 5/26/2025 1500 by Nicolle Rivers LPN  Body Position: position changed independently  Skin Protection: transparent dressing maintained  Intervention: Prevent and Manage VTE (Venous Thromboembolism) Risk  Recent Flowsheet Documentation  Taken 5/26/2025 1500 by Nicolle Rivers LPN  VTE Prevention/Management: patient refused intervention  Intervention: Prevent Infection  Recent Flowsheet Documentation  Taken 5/26/2025 1500 by Nicolle Rivers LPN  Infection Prevention:   single patient room provided   rest/sleep promoted   hand hygiene promoted   equipment surfaces disinfected  Goal: Optimal Comfort and Wellbeing  Outcome: Progressing  Intervention: Provide Person-Centered Care  Recent  Flowsheet Documentation  Taken 5/26/2025 1500 by Nicolle Rivers LPN  Trust Relationship/Rapport:   care explained   choices provided   questions answered  Goal: Readiness for Transition of Care  Outcome: Progressing

## 2025-05-27 LAB
ANION GAP SERPL CALCULATED.3IONS-SCNC: 8 MMOL/L (ref 5–15)
BASOPHILS # BLD AUTO: 0.03 10*3/MM3 (ref 0–0.2)
BASOPHILS NFR BLD AUTO: 0.4 % (ref 0–1.5)
BUN SERPL-MCNC: 13 MG/DL (ref 8–23)
BUN/CREAT SERPL: 13.8 (ref 7–25)
CALCIUM SPEC-SCNC: 8.3 MG/DL (ref 8.2–9.6)
CHLORIDE SERPL-SCNC: 104 MMOL/L (ref 98–107)
CO2 SERPL-SCNC: 26 MMOL/L (ref 22–29)
CREAT SERPL-MCNC: 0.94 MG/DL (ref 0.57–1)
DEPRECATED RDW RBC AUTO: 45.1 FL (ref 37–54)
EGFRCR SERPLBLD CKD-EPI 2021: 57.4 ML/MIN/1.73
EOSINOPHIL # BLD AUTO: 0.2 10*3/MM3 (ref 0–0.4)
EOSINOPHIL NFR BLD AUTO: 2.4 % (ref 0.3–6.2)
ERYTHROCYTE [DISTWIDTH] IN BLOOD BY AUTOMATED COUNT: 13.2 % (ref 12.3–15.4)
GLUCOSE SERPL-MCNC: 123 MG/DL (ref 65–99)
HCT VFR BLD AUTO: 29.1 % (ref 34–46.6)
HGB BLD-MCNC: 9.3 G/DL (ref 12–15.9)
IMM GRANULOCYTES # BLD AUTO: 0.02 10*3/MM3 (ref 0–0.05)
IMM GRANULOCYTES NFR BLD AUTO: 0.2 % (ref 0–0.5)
LYMPHOCYTES # BLD AUTO: 1.55 10*3/MM3 (ref 0.7–3.1)
LYMPHOCYTES NFR BLD AUTO: 18.7 % (ref 19.6–45.3)
MAGNESIUM SERPL-MCNC: 2.1 MG/DL (ref 1.7–2.3)
MCH RBC QN AUTO: 30.4 PG (ref 26.6–33)
MCHC RBC AUTO-ENTMCNC: 32 G/DL (ref 31.5–35.7)
MCV RBC AUTO: 95.1 FL (ref 79–97)
MONOCYTES # BLD AUTO: 0.67 10*3/MM3 (ref 0.1–0.9)
MONOCYTES NFR BLD AUTO: 8.1 % (ref 5–12)
NEUTROPHILS NFR BLD AUTO: 5.84 10*3/MM3 (ref 1.7–7)
NEUTROPHILS NFR BLD AUTO: 70.2 % (ref 42.7–76)
NRBC BLD AUTO-RTO: 0 /100 WBC (ref 0–0.2)
PHOSPHATE SERPL-MCNC: 2.7 MG/DL (ref 2.5–4.5)
PLATELET # BLD AUTO: 135 10*3/MM3 (ref 140–450)
PMV BLD AUTO: 11.3 FL (ref 6–12)
POTASSIUM SERPL-SCNC: 3.8 MMOL/L (ref 3.5–5.2)
POTASSIUM SERPL-SCNC: 4.3 MMOL/L (ref 3.5–5.2)
QT INTERVAL: 378 MS
QT INTERVAL: 396 MS
QT INTERVAL: 398 MS
QTC INTERVAL: 486 MS
QTC INTERVAL: 495 MS
QTC INTERVAL: 502 MS
RBC # BLD AUTO: 3.06 10*6/MM3 (ref 3.77–5.28)
SODIUM SERPL-SCNC: 138 MMOL/L (ref 136–145)
WBC NRBC COR # BLD AUTO: 8.31 10*3/MM3 (ref 3.4–10.8)

## 2025-05-27 PROCEDURE — 25010000002 CEFTRIAXONE PER 250 MG

## 2025-05-27 PROCEDURE — 25010000002 AMIODARONE IN DEXTROSE 5% 360-4.14 MG/200ML-% SOLUTION: Performed by: INTERNAL MEDICINE

## 2025-05-27 PROCEDURE — 97162 PT EVAL MOD COMPLEX 30 MIN: CPT

## 2025-05-27 PROCEDURE — 93010 ELECTROCARDIOGRAM REPORT: CPT | Performed by: INTERNAL MEDICINE

## 2025-05-27 PROCEDURE — 84132 ASSAY OF SERUM POTASSIUM: CPT | Performed by: INTERNAL MEDICINE

## 2025-05-27 PROCEDURE — 83735 ASSAY OF MAGNESIUM: CPT | Performed by: INTERNAL MEDICINE

## 2025-05-27 PROCEDURE — 84100 ASSAY OF PHOSPHORUS: CPT | Performed by: INTERNAL MEDICINE

## 2025-05-27 PROCEDURE — 99232 SBSQ HOSP IP/OBS MODERATE 35: CPT | Performed by: INTERNAL MEDICINE

## 2025-05-27 PROCEDURE — 93005 ELECTROCARDIOGRAM TRACING: CPT | Performed by: INTERNAL MEDICINE

## 2025-05-27 PROCEDURE — 85025 COMPLETE CBC W/AUTO DIFF WBC: CPT

## 2025-05-27 PROCEDURE — 80048 BASIC METABOLIC PNL TOTAL CA: CPT

## 2025-05-27 RX ORDER — POTASSIUM CHLORIDE 1500 MG/1
20 TABLET, EXTENDED RELEASE ORAL ONCE
Status: COMPLETED | OUTPATIENT
Start: 2025-05-27 | End: 2025-05-27

## 2025-05-27 RX ORDER — METOPROLOL SUCCINATE 50 MG/1
50 TABLET, EXTENDED RELEASE ORAL 2 TIMES DAILY
Status: DISCONTINUED | OUTPATIENT
Start: 2025-05-27 | End: 2025-05-28

## 2025-05-27 RX ADMIN — CEFTRIAXONE SODIUM 1000 MG: 1 INJECTION, POWDER, FOR SOLUTION INTRAMUSCULAR; INTRAVENOUS at 14:37

## 2025-05-27 RX ADMIN — SENNOSIDES AND DOCUSATE SODIUM 2 TABLET: 50; 8.6 TABLET ORAL at 22:19

## 2025-05-27 RX ADMIN — AMIODARONE HYDROCHLORIDE 200 MG: 200 TABLET ORAL at 14:37

## 2025-05-27 RX ADMIN — METOPROLOL SUCCINATE 50 MG: 50 TABLET, EXTENDED RELEASE ORAL at 21:43

## 2025-05-27 RX ADMIN — METOPROLOL SUCCINATE 25 MG: 25 TABLET, EXTENDED RELEASE ORAL at 08:46

## 2025-05-27 RX ADMIN — ATORVASTATIN CALCIUM 40 MG: 40 TABLET, FILM COATED ORAL at 08:46

## 2025-05-27 RX ADMIN — APIXABAN 2.5 MG: 2.5 TABLET, FILM COATED ORAL at 16:19

## 2025-05-27 RX ADMIN — AMIODARONE HYDROCHLORIDE 0.5 MG/MIN: 1.8 INJECTION, SOLUTION INTRAVENOUS at 07:18

## 2025-05-27 RX ADMIN — TIMOLOL MALEATE 1 DROP: 5 SOLUTION OPHTHALMIC at 08:48

## 2025-05-27 RX ADMIN — ASPIRIN 81 MG: 81 TABLET, COATED ORAL at 08:46

## 2025-05-27 RX ADMIN — POTASSIUM CHLORIDE 20 MEQ: 1500 TABLET, EXTENDED RELEASE ORAL at 06:28

## 2025-05-27 NOTE — THERAPY EVALUATION
Patient Name: Amanda Brown  : 6/10/1933    MRN: [id number]                              Today's Date: 2025       Admit Date: 2025    Visit Dx:     ICD-10-CM ICD-9-CM   1. Coronary artery disease involving native coronary artery of native heart without angina pectoris  I25.10 414.01   2. Elevated troponin  R79.89 790.6   3. Paroxysmal atrial fibrillation with rapid ventricular response  I48.0 427.31     Patient Active Problem List   Diagnosis    Coronary artery disease involving native coronary artery of native heart without angina pectoris    Elbow joint pain    Impacted cerumen    Osteoarthritis    Mixed hyperlipidemia    Benign essential HTN    Paroxysmal SVT (supraventricular tachycardia)    Paroxysmal atrial fibrillation with rapid ventricular response    A-fib    Elevated troponin     Past Medical History:   Diagnosis Date    Benign essential HTN 2021    Closed fracture of surgical neck of humerus 2022    Closed supracondylar fracture of left humerus 2021    Coronary artery disease involving native coronary artery of native heart without angina pectoris 2016    Fracture, humerus     left    GERD (gastroesophageal reflux disease)     Mixed hyperlipidemia 2015    Osteoarthritis 2021    Paroxysmal SVT (supraventricular tachycardia) 2024    Pneumonia due to infectious organism 2024    PONV (postoperative nausea and vomiting)      Past Surgical History:   Procedure Laterality Date    ARTERIAL BYPASS SURGERY      CARDIAC CATHETERIZATION      CARDIAC SURGERY      CABG 4V    CORONARY ARTERY BYPASS GRAFT        General Information       Row Name 25 1509          Physical Therapy Time and Intention    Document Type evaluation  -CM     Mode of Treatment physical therapy  -CM       Row Name 25 1509          General Information    Patient Profile Reviewed yes  -CM     Prior Level of Function independent:;all household  mobility;gait;community mobility  ambulates short to long community distances w/o assistive device; no home O2; has 's license but does not drive  -CM     Existing Precautions/Restrictions fall;cardiac  -CM     Barriers to Rehab none identified  -CM       Row Name 05/27/25 1509          Living Environment    Current Living Arrangements home  -CM     People in Home child(neftali), adult;other (see comments)  dtr, provides 24 hr care as needed  -CM       Row Name 05/27/25 1509          Home Main Entrance    Number of Stairs, Main Entrance none;other (see comments)  no stairs to enter through garage  -CM       Row Name 05/27/25 1509          Stairs Within Home, Primary    Number of Stairs, Within Home, Primary none  -CM       Row Name 05/27/25 1509          Cognition    Orientation Status (Cognition) oriented x 4;other (see comments)  very pleasant  -CM               User Key  (r) = Recorded By, (t) = Taken By, (c) = Cosigned By      Initials Name Provider Type    Leticia Villegas, PT Physical Therapist                   Mobility       Row Name 05/27/25 1510          Bed Mobility    Bed Mobility bed mobility (all) activities  -CM     All Activities, [place] (Bed Mobility) not tested  -CM     Comment, (Bed Mobility) up in chair when PT arrived.  -CM       Row Name 05/27/25 1510          Bed-Chair Transfer    Bed-Chair [place] (Transfers) maximum assist (25% patient effort)  -CM       Row Name 05/27/25 1510          Gait/Stairs (Locomotion)    [place] Level (Gait) independent  -CM     Assistive Device (Gait) other (see comments)  gait belt, non skid socks  -CM     Patient was able to Ambulate yes  -CM     Distance in Feet (Gait) 180  rapid lenny; no loss of balance; no significant gait deviations  -CM               User Key  (r) = Recorded By, (t) = Taken By, (c) = Cosigned By      Initials Name Provider Type    Leticia Villegas, PT Physical Therapist                   Obj/Interventions        Row Name 05/27/25 1517          Range of Motion Comprehensive    General Range of Motion upper extremity range of motion deficits identified;bilateral lower extremity ROM WNL  -CM     Comment, General Range of Motion R shoulder limited 75%; no other UE limitations  -CM       Row Name 05/27/25 1517          Strength Comprehensive (MMT)    General Manual Muscle Testing (MMT) Assessment no strength deficits identified  -CM     Comment, General Manual Muscle Testing (MMT) Assessment strength 5/5 for BLE's and 4/5 for LUE, 3+/5 RUE except for shoulder  -CM       Row Name 05/27/25 1517          Balance    Balance Assessment sitting static balance;sitting dynamic balance;standing static balance;standing dynamic balance  -CM     Static Sitting Balance independent  -CM     Dynamic Sitting Balance independent  -CM     Position, Sitting Balance sitting in chair  -CM     Static Standing Balance independent  -CM     Dynamic Standing Balance independent  -CM     Position/Device Used, Standing Balance unsupported  -CM       Row Name 05/27/25 1517          Sensory Assessment (Somatosensory)    Sensory Assessment (Somatosensory) sensation intact  -CM               User Key  (r) = Recorded By, (t) = Taken By, (c) = Cosigned By      Initials Name Provider Type    Leticia Villegas, PT Physical Therapist                   Goals/Plan    No documentation.                  Clinical Impression       Row Name 05/27/25 1518          Pain    Pretreatment Pain Rating 0/10 - no pain  -CM     Posttreatment Pain Rating 0/10 - no pain  -CM     Pain Management Interventions exercise or physical activity utilized  -CM     Response to Pain Interventions activity level improved;functional ability improved;mobility function improved  -CM       Row Name 05/27/25 1518          Plan of Care Review    Plan of Care Reviewed With child;patient  all 3 adult children present  -CM     Progress improving  -CM     Outcome Evaluation 89 yo female adm 5/25/25  for chest pain and a fib w/ rvr. Pt dx w/ nstemi (troponin 607 at max). Had cardiac cath yesterday, w/ 3 vessel severe cad found. Recommendation is for medical management. Pt remains on amio drip at this time. PMH: cad, hld, htn, a fib. At baseline, pt lives w/ her adult dtr, who is able to provide 24 hr care as needed. Dtr is retired. Pt is independent for ambulation at home and in community w/o assistive device. She does not use home O2. Lives in single level home w/ no stairs to enter from garage. Today, pt is alert, oriented, and very pleasant. She has moderate to severe limitation in her R shoulder ROM, but otherwise has full ROM. Has 5/5 strength in BLEs. Comes to stand w/ supervision, and then is able to amb 180 ft w/ supervision. No need for skilled PT. Recommend home w/ family at d/c. Will sign off.  -       Row Name 05/27/25 1529          Therapy Assessment/Plan (PT)    Criteria for Skilled Interventions Met (PT) no;no problems identified which require skilled intervention  -     Therapy Frequency (PT) evaluation only  -CM       Row Name 05/27/25 1529          Vital Signs    Pretreatment Heart Rate (beats/min) 116  -CM     Intratreatment Heart Rate (beats/min) 130  -CM     Posttreatment Heart Rate (beats/min) 110  -CM     O2 Delivery Pre Treatment room air  -CM     O2 Delivery Intra Treatment room air  -CM     O2 Delivery Post Treatment room air  -CM     Recovery Time VSS, except for HR elevated w/ activity  -CM       Row Name 05/27/25 1529          Positioning and Restraints    Pre-Treatment Position sitting in chair/recliner  -CM     Post Treatment Position chair  -CM     In Chair notified nsg;sitting;call light within reach;encouraged to call for assist;exit alarm on;with family/caregiver  -               User Key  (r) = Recorded By, (t) = Taken By, (c) = Cosigned By      Initials Name Provider Type    Leticia Villegas, PT Physical Therapist                   Outcome Measures       Row Name  05/27/25 1530 05/27/25 0800       How much help from another person do you currently need...    Turning from your back to your side while in flat bed without using bedrails? 4  -CM 4  -MS (r) CB (t) MS (c)    Moving from lying on back to sitting on the side of a flat bed without bedrails? 4  -CM 4  -MS (r) CB (t) MS (c)    Moving to and from a bed to a chair (including a wheelchair)? 4  -CM 4  -MS (r) CB (t) MS (c)    Standing up from a chair using your arms (e.g., wheelchair, bedside chair)? 4  -CM 4  -MS (r) CB (t) MS (c)    Climbing 3-5 steps with a railing? 4  -CM 3  -MS (r) CB (t) MS (c)    To walk in hospital room? 4  -CM 4  -MS (r) CB (t) MS (c)    AM-PAC 6 Clicks Score (PT) 24  -CM 23  -CB              User Key  (r) = Recorded By, (t) = Taken By, (c) = Cosigned By      Initials Name Provider Type    CM Leticia Garcia, PT Physical Therapist    Jacquelyn Posadas LPN Licensed Nurse    Sallie Henry, RN Registered Nurse                                 Physical Therapy Education       Title: PT OT SLP Therapies (Done)       Topic: Physical Therapy (Done)       Point: Mobility training (Done)       Learning Progress Summary            Patient Acceptance, E,TB, VU,DU by CM at 5/27/2025 1531   Family Acceptance, E,TB, VU,DU by CM at 5/27/2025 1531                      Point: Home exercise program (Done)       Learning Progress Summary            Patient Acceptance, E,TB, VU,DU by CM at 5/27/2025 1531   Family Acceptance, E,TB, VU,DU by CM at 5/27/2025 1531                      Point: Body mechanics (Done)       Learning Progress Summary            Patient Acceptance, E,TB, VU,DU by CM at 5/27/2025 1531   Family Acceptance, E,TB, VU,DU by CM at 5/27/2025 1531                      Point: Precautions (Done)       Learning Progress Summary            Patient Acceptance, E,TB, VU,DU by CM at 5/27/2025 1531   Family Acceptance, E,TB, VU,DU by CM at 5/27/2025 1531                                      User Key        Initials Effective Dates Name Provider Type Discipline     06/16/21 -  Leticia Garcia, PT Physical Therapist PT                  PT Recommendation and Plan     Progress: improving  Outcome Evaluation: 89 yo female adm 5/25/25 for chest pain and a fib w/ rvr. Pt dx w/ nstemi (troponin 607 at max). Had cardiac cath yesterday, w/ 3 vessel severe cad found. Recommendation is for medical management. Pt remains on amio drip at this time. PMH: cad, hld, htn, a fib. At baseline, pt lives w/ her adult dtr, who is able to provide 24 hr care as needed. Dtr is retired. Pt is independent for ambulation at home and in community w/o assistive device. She does not use home O2. Lives in single level home w/ no stairs to enter from garage. Today, pt is alert, oriented, and very pleasant. She has moderate to severe limitation in her R shoulder ROM, but otherwise has full ROM. Has 5/5 strength in BLEs. Comes to stand w/ supervision, and then is able to amb 180 ft w/ supervision. No need for skilled PT. Recommend home w/ family at d/c. Will sign off.     Time Calculation:   PT Evaluation Complexity  History, PT Evaluation Complexity: 3 or more personal factors and/or comorbidities  Examination of Body Systems (PT Eval Complexity): total of 4 or more elements  Clinical Presentation (PT Evaluation Complexity): evolving  Clinical Decision Making (PT Evaluation Complexity): moderate complexity  Overall Complexity (PT Evaluation Complexity): moderate complexity     PT Charges       Row Name 05/27/25 1531             Time Calculation    Start Time 1348  -CM      Stop Time 1410  -CM      Time Calculation (min) 22 min  -CM      PT Received On 05/27/25  -CM         Time Calculation- PT    Total Timed Code Minutes- PT 0 minute(s)  -CM                User Key  (r) = Recorded By, (t) = Taken By, (c) = Cosigned By      Initials Name Provider Type    Leticia Villegas, PT Physical Therapist                  Therapy Charges for Today        Code Description Service Date Service Provider Modifiers Qty    76561247270 HC PT EVAL MOD COMPLEXITY 4 5/27/2025 Leticia Garcia, PT GP 1            PT G-Codes  AM-PAC 6 Clicks Score (PT): 24  PT Discharge Summary  Anticipated Discharge Disposition (PT): home with assist    Leticia Garcia, PT  5/27/2025

## 2025-05-27 NOTE — PROGRESS NOTES
[facility]        LOS:  LOS: 1 day   Patient Name: Amanda Brown  Age/Sex: 89 y.o. female  : 6/10/1933  MRN: [id number]    Day of Service: 25   Length of Stay: 1  Encounter Provider: ADEEL Thakur  Place of Service: [facility]ARD[facility]  Patient Care Team:  Ranulfo Mims MD as PCP - General (Internal Medicine)  Deep Aceves MD as Consulting Physician (Cardiology)    Subjective:     Chief Complaint: f/u Afib, elevated troponin    Subjective: remains on amiodarone. Afib, rates variable    Current Medications:   Scheduled Meds:amiodarone, 150 mg, Intravenous, Once   Followed by  amiodarone, 200 mg, Oral, Once   Followed by  amiodarone, 200 mg, Oral, Q8H   Followed by  [START ON 6/3/2025] amiodarone, 200 mg, Oral, Q12H   Followed by  [START ON 2025] amiodarone, 200 mg, Oral, Daily  apixaban, 2.5 mg, Oral, Q12H  aspirin, 81 mg, Oral, Daily  atorvastatin, 40 mg, Oral, Daily  cefTRIAXone, 1,000 mg, Intravenous, Q24H  [Held by provider] hydroCHLOROthiazide, 25 mg, Oral, Daily  [Held by provider] lisinopril, 20 mg, Oral, BID  metoprolol succinate XL, 25 mg, Oral, BID  timolol, 1 drop, Both Eyes, Daily      Continuous Infusions:amiodarone, 0.5 mg/min, Last Rate: 0.5 mg/min (25 0718)        Allergies:  No Known Allergies    Review of Systems   Constitutional: Negative for chills and fever.   HENT:  Negative for ear discharge and nosebleeds.    Eyes:  Negative for discharge and redness.   Cardiovascular:  Negative for chest pain, orthopnea, palpitations, paroxysmal nocturnal dyspnea and syncope.   Respiratory:  Negative for cough, shortness of breath and wheezing.    Endocrine: Negative for heat intolerance.   Skin:  Negative for rash.   Musculoskeletal:  Negative for arthritis and myalgias.   Gastrointestinal:  Negative for abdominal pain, melena, nausea and vomiting.   Genitourinary:  Negative for dysuria and hematuria.   Neurological:  Negative for  dizziness, light-headedness, numbness and tremors.   Psychiatric/Behavioral:  Negative for depression. The patient is not nervous/anxious.          Objective:     Temp:  [97.5 °F (36.4 °C)-98.8 °F (37.1 °C)] 98.8 °F (37.1 °C)  Heart Rate:  [] (P) 114  Resp:  [13-21] 19  BP: (109-143)/(52-83) (P) 138/78     Intake/Output Summary (Last 24 hours) at 5/27/2025 1448  Last data filed at 5/27/2025 0354  Gross per 24 hour   Intake --   Output 300 ml   Net -300 ml     Body mass index is 26.49 kg/m².      05/25/25  0840 05/26/25  1456 05/27/25  0354   Weight: 63 kg (138 lb 14.2 oz) 63 kg (138 lb 14.2 oz) 63.6 kg (140 lb 3.4 oz)         General Appearance:    Alert, cooperative, in no acute distress                                Head: Atraumatic, normocephalic, PERRLA               Neck:   supple,  no JVD   Lungs:     Clear to auscultation, respirations regular, even and               unlabored    Heart:    irregular rhythm and normal rate, normal S1 and S2   Abdomen:     Normal bowel sounds, no masses, no organomegaly, soft  nontender, nondistended, no guarding, no rebound  tenderness   Extremities:   Moves all extremities well, no edema, no cyanosis, no  redness   Pulses:   Pulses palpable and equal bilaterally   Skin:   No bleeding, bruising or rash   Neurologic:   Awake, alert, oriented x3         Lab Review:   Results from last 7 days   Lab Units 05/27/25  1156 05/27/25  0502 05/26/25  0543   SODIUM mmol/L  --  138 141   POTASSIUM mmol/L 4.3 3.8 4.2   CHLORIDE mmol/L  --  104 104   CO2 mmol/L  --  26.0 26.1   BUN mg/dL  --  13 16   CREATININE mg/dL  --  0.94 1.17*   GLUCOSE mg/dL  --  123* 110*   CALCIUM mg/dL  --  8.3 8.9   AST (SGOT) U/L  --   --  86*   ALT (SGPT) U/L  --   --  40*     Results from last 7 days   Lab Units 05/26/25  0543 05/25/25  1454 05/25/25  1104 05/25/25  0947   HSTROP T ng/L 607* 394* 122* 76*     Results from last 7 days   Lab Units 05/27/25  0502 05/26/25  0543   WBC 10*3/mm3 8.31 9.08    HEMOGLOBIN g/dL 9.3* 10.1*   HEMATOCRIT % 29.1* 31.5*   PLATELETS 10*3/mm3 135* 153     Results from last 7 days   Lab Units 05/25/25  0947   INR  1.21*     Results from last 7 days   Lab Units 05/27/25  0502 05/26/25  0543   MAGNESIUM mg/dL 2.1 2.2     Results from last 7 days   Lab Units 05/25/25  0947   CHOLESTEROL mg/dL 139   TRIGLYCERIDES mg/dL 61   HDL CHOL mg/dL 45     Results from last 7 days   Lab Units 05/25/25  0947   PROBNP pg/mL 3,092.0*     Results from last 7 days   Lab Units 05/25/25  0947   TSH uIU/mL 4.390*       Recent Radiology:  Imaging Results (Most Recent)       Procedure Component Value Units Date/Time    XR Outside Chest [[id number]] Resulted: 05/27/25 1003     Updated: 05/27/25 1003    Narrative:      This procedure was auto-finalized with no dictation required.    CT Angiogram Chest Pulmonary Embolism [[id number]] Collected: 05/25/25 1602     Updated: 05/25/25 1607    Narrative:      CT ANGIOGRAM CHEST PULMONARY EMBOLISM    Date of Exam: 5/25/2025 3:40 PM EDT    Indication: elevated ddimer at OSH.    Comparison: Chest radiograph 1/7/2024.    Technique: Axial CT images were obtained of the chest after the uneventful intravenous administration of iodinated contrast utilizing pulmonary embolism protocol.  In addition, a 3-D volume rendered image was created for interpretation.  Sagittal and   coronal reconstructions were performed.  Automated exposure control and iterative reconstruction methods were used.      Findings:    Thyroid and thoracic inlet: No significant abnormality.    Lymph nodes: No pathologic appearing lymph nodes by imaging criteria.    Cardiovascular: Cardiomegaly. No pericardial effusion. Aorta and main pulmonary artery diameters are within normal range.. Coronary artery calcifications. Status post CABG. Aortic atherosclerosis.. No evidence of pulmonary embolism.    Esophagus: Small hiatal hernia.    Lung parenchyma: Scattered atelectasis including in the lower lobes and  lingula. Mild interlobular septal thickening and bilateral hazy opacification.    Airways: Patent trachea and mainstem bronchi.    Pleura: Small bilateral pleural effusions. No pneumothorax.    Chest wall and osseous structures: Degenerative changes of the imaged spine. No acute osseous abnormality.    Included abdomen: Cholelithiasis. Right adrenal hypoattenuating lesion measuring 14 mm, likely an adenoma.      Impression:      Impression:  No evidence of pulmonary embolism.    Cardiomegaly and mild pulmonary edema with small bilateral pleural effusions.      Electronically Signed: Arnaldo Napier MD    5/25/2025 4:05 PM EDT    Workstation ID: [id number]            ECHOCARDIOGRAM:    Results for orders placed during the hospital encounter of 05/25/25    Adult Transthoracic Echo Complete w/ Color, Spectral and Contrast if necessary per protocol    Interpretation Summary    Left ventricular ejection fraction appears to be 56 - 60%.    Left ventricular diastolic function is consistent with (grade III w/high LAP) reversible restrictive pattern.    The left atrial cavity is moderately dilated.    Moderate to severe mitral valve regurgitation is present.    Moderate tricuspid valve regurgitation is present.    Estimated right ventricular systolic pressure from tricuspid regurgitation is moderately elevated (45-55 mmHg).        I reviewed the patient's new clinical results.    EKG:      Assessment:       Paroxysmal atrial fibrillation with rapid ventricular response    Coronary artery disease involving native coronary artery of native heart without angina pectoris    Mixed hyperlipidemia    Benign essential HTN    Paroxysmal SVT (supraventricular tachycardia)    A-fib    Elevated troponin    A-fib RVR  [id number] score is 5  On Eliquis  Toprol-XL for rate control  On amiodarone     Non-ST elevation MI  History of coronary artery disease with CABG in 2009, unknown anatomy  High-sensitivity troponin up to 600  S/p [facility]  Severe three-vessel native obstructive coronary disease  Patent LIMA to LAD, vein graft to LCx and vein graft to RCA  Elevated LVEDP  Continue aspirin, high intensity statin and beta-blocker.     HFpEF  Mitral regurgitation  proBNP 3000  HFpEF secondary to atrial fibrillation, coronary artery disease, mitral regurgitation  Echocardiogram shows preserved LV function, grade 3 diastolic dysfunction and severe mitral regurgitation with pulmonary hypertension  Diuretics based on cardiac catheterization and volume assessment  Monitor I's and O's and replace electrolytes as needed  Consider adding Jardiance  MR is also worsening atrial fibrillation  Recommend elective EVERARDO to further evaluate mitral regurgitation when she recovers from this episode.     Hypertension  Blood pressures currently normal  Continue beta-blocker     Diabetes  A1c 6.24  Insulin sliding scale during inpatient stay     Hyperlipidemia  Goal LDL less than 55  Continue high intensity statin     AUGUSTO  Renal function is improving  Closely monitor renal function while on diuretics     Transaminitis  AST/ALT 86/40  May be secondary to congestion       Plan:   Patient remains in Afib, rates variable-- oral amio started, increase beta blockers  Continue diuresis  Outpt EVERARDO to assess mitral valve outpt +/- cardioversion  [facility] showed patent grafts- troponin 394-600s- no chest pain    Patient is seen and examined and findings are verified.  All data is reviewed by me personally.  Assessment and plan formulated by APC was done after discussion with attending.  I spent more than 50% of time in taking care of the patient.    Patient is seen and examined and findings are verified.  All data is reviewed by me personally.  Assessment and plan formulated by APC was done after discussion with attending.  I spent more than 50% of time in taking care of the patient.    Patient is admitted with chest pain and palpitation.  Patient was noted to be in congestive heart  failure.    Patient is sitting up in the chair.    Normal S1 and S2.  Heart rate is irregular.  Leg edema present.  No orthopnea no crackles.    Patient was admitted with chest pain and troponins were positive.  Patient underwent cardiac catheterization which showed severe left coronary artery disease.  SVG graft to marginal and LIMA to LAD/diagonal was patent.  RCA was not grafted.  It was free of significant disease.    I would recommend continue gentle diuresis.  I would start Eliquis.    Electronically signed by Deep Aceves MD, 05/27/25, 3:37 PM EDT.    ADEEL Thakur  05/27/25  14:48 EDT

## 2025-05-27 NOTE — CASE MANAGEMENT/SOCIAL WORK
Social Work Assessment  [facility]     Patient Name: Amanda Brown  MRN: [id number]  Today's Date: 5/27/2025    Admit Date: 5/25/2025     Discharge Plan       Row Name 05/27/25 1506       Plan    Plan Comments LSW requested [facility] check price for Eliquis 2.5mg per consult. Per [facility], cost is $47/month copay. Will need to discuss with patient/family to confirm cost is okay.                  FORTUNATO Page, VIDAW, [facility]  Lead   Phone: [phone]  Cell: [phone]  Fax: [phone]  [email or web address]

## 2025-05-27 NOTE — PLAN OF CARE
Problem: Adult Inpatient Plan of Care  Goal: Plan of Care Review  Outcome: Progressing  Flowsheets (Taken 5/27/2025 0540)  Progress: no change  Plan of Care Reviewed With: patient  Goal: Patient-Specific Goal (Individualized)  Outcome: Progressing  Goal: Absence of Hospital-Acquired Illness or Injury  Outcome: Progressing  Intervention: Identify and Manage Fall Risk  Recent Flowsheet Documentation  Taken 5/27/2025 0400 by Daiana Rodriguez RN  Safety Promotion/Fall Prevention: safety round/check completed  Taken 5/27/2025 0200 by Daiana Rodriguez RN  Safety Promotion/Fall Prevention: safety round/check completed  Taken 5/27/2025 0000 by Daiana Rodriguez RN  Safety Promotion/Fall Prevention: safety round/check completed  Intervention: Prevent Skin Injury  Recent Flowsheet Documentation  Taken 5/27/2025 0400 by Daiana Rodriguez RN  Body Position: position changed independently  Taken 5/27/2025 0200 by Daiana Rdoriguez RN  Body Position: position changed independently  Taken 5/27/2025 0000 by Daiana Rodriguez RN  Body Position: position changed independently  Skin Protection: incontinence pads utilized  Intervention: Prevent and Manage VTE (Venous Thromboembolism) Risk  Recent Flowsheet Documentation  Taken 5/27/2025 0400 by Daiana Rodriguez RN  VTE Prevention/Management:   SCDs (sequential compression devices) off   patient refused intervention  Taken 5/27/2025 0000 by Daiana Rodriguez RN  VTE Prevention/Management:   SCDs (sequential compression devices) off   patient refused intervention  Intervention: Prevent Infection  Recent Flowsheet Documentation  Taken 5/27/2025 0400 by Daiana Rodriguez RN  Infection Prevention: hand hygiene promoted  Taken 5/27/2025 0200 by Daiana Rodriguez RN  Infection Prevention: hand hygiene promoted  Taken 5/27/2025 0000 by Daiana Rodriguez RN  Infection Prevention: hand hygiene promoted  Goal: Optimal Comfort and Wellbeing  Outcome: Progressing  Intervention: Provide Person-Centered Care  Recent  Flowsheet Documentation  Taken 5/27/2025 0400 by Daiana Rodriguez RN  Trust Relationship/Rapport:   choices provided   care explained   emotional support provided   empathic listening provided   questions answered   questions encouraged   reassurance provided   thoughts/feelings acknowledged  Taken 5/27/2025 0000 by Daiana Rodriguez RN  Trust Relationship/Rapport:   care explained   choices provided   emotional support provided   empathic listening provided   questions answered   reassurance provided   questions encouraged   thoughts/feelings acknowledged  Goal: Readiness for Transition of Care  Outcome: Progressing     Problem: Comorbidity Management  Goal: Blood Pressure in Desired Range  Outcome: Progressing  Intervention: Maintain Blood Pressure Management  Recent Flowsheet Documentation  Taken 5/27/2025 0400 by Daiana Rodriguez RN  Medication Review/Management: medications reviewed  Taken 5/27/2025 0200 by Daiana Rodriguez RN  Medication Review/Management: medications reviewed  Taken 5/27/2025 0000 by Daiana Rodriguez RN  Medication Review/Management: medications reviewed     Problem: Dysrhythmia  Goal: Normalized Cardiac Rhythm  Outcome: Progressing  Intervention: Monitor and Manage Cardiac Rhythm Effect  Recent Flowsheet Documentation  Taken 5/27/2025 0400 by Daiana Rodriguez RN  VTE Prevention/Management:   SCDs (sequential compression devices) off   patient refused intervention  Taken 5/27/2025 0000 by Daiana Rodriguez RN  VTE Prevention/Management:   SCDs (sequential compression devices) off   patient refused intervention     Problem: Fall Injury Risk  Goal: Absence of Fall and Fall-Related Injury  Outcome: Progressing  Intervention: Identify and Manage Contributors  Recent Flowsheet Documentation  Taken 5/27/2025 0400 by Daiana Rodriguez RN  Medication Review/Management: medications reviewed  Self-Care Promotion: independence encouraged  Taken 5/27/2025 0200 by Daiana Rodriguez RN  Medication Review/Management:  medications reviewed  Self-Care Promotion: independence encouraged  Taken 5/27/2025 0000 by Daiana Rodriguez RN  Medication Review/Management: medications reviewed  Self-Care Promotion: independence encouraged  Intervention: Promote Injury-Free Environment  Recent Flowsheet Documentation  Taken 5/27/2025 0400 by Daiana Rodriguez, RN  Safety Promotion/Fall Prevention: safety round/check completed  Taken 5/27/2025 0200 by Daiana Rodriguez RN  Safety Promotion/Fall Prevention: safety round/check completed  Taken 5/27/2025 0000 by Daiana Rodriguez RN  Safety Promotion/Fall Prevention: safety round/check completed   Goal Outcome Evaluation:  Plan of Care Reviewed With: patient        Progress: no change

## 2025-05-27 NOTE — CASE MANAGEMENT/SOCIAL WORK
Discharge Planning Assessment   [facility]     Patient Name: Amanda Brown  MRN: [id number]  Today's Date: 5/27/2025    Admit Date: 5/25/2025    Plan: From home with daughter.   Discharge Needs Assessment       Row Name 05/27/25 1625       Living Environment    People in Home child(neftali), adult    Name(s) of People in Home Dallin Barone    Current Living Arrangements home    Potentially Unsafe Housing Conditions none    In the past 12 months has the electric, gas, oil, or water company threatened to shut off services in your home? No    Primary Care Provided by self    Provides Primary Care For no one    Family Caregiver if Needed child(neftali), adult    Family Caregiver Names Dallin Barone    Quality of Family Relationships helpful;involved;supportive    Able to Return to Prior Arrangements yes       Resource/Environmental Concerns    Resource/Environmental Concerns none    Transportation Concerns none       Transportation Needs    In the past 12 months, has lack of transportation kept you from medical appointments or from getting medications? no    In the past 12 months, has lack of transportation kept you from meetings, work, or from getting things needed for daily living? No       Food Insecurity    Within the past 12 months, you worried that your food would run out before you got the money to buy more. Never true    Within the past 12 months, the food you bought just didn't last and you didn't have money to get more. Never true       Transition Planning    Patient/Family Anticipates Transition to home with family    Patient/Family Anticipated Services at Transition none    Transportation Anticipated family or friend will provide       Discharge Needs Assessment    Readmission Within the Last 30 Days no previous admission in last 30 days    Equipment Currently Used at Home none    Concerns to be Addressed denies needs/concerns at this time    Do you want help finding or keeping work or a job? Patient declined    Do  you want help with school or training? For example, starting or completing job training or getting a high school diploma, GED or equivalent No    Anticipated Changes Related to Illness none    Equipment Needed After Discharge rollator                   Discharge Plan       Row Name 05/27/25 5231       Plan    Plan From home with daughter.    Patient/Family in Agreement with Plan yes    Plan Comments CM met with patient and family at bedside. Confirmed PCP, insurance, and pharmacy.  Meds to beds enrolled. Patient denies any difficulty affording medications. Patient not current with any therapies. Patient denies any DME use, but would like a rollator at discharge. Confirmed transportation at discharge will be daughter Lizabeth who she lives with. D/C Barriers: Amio gtt transitioning to oral today, IV rocephin.      Row Name 05/27/25 1593       Plan    Plan Comments LSW requested [facility] check price for Eliquis 2.5mg per consult. Per [facility], cost is $47/month copay. Will need to discuss with patient/family to confirm cost is okay.                Demographic Summary       Row Name 05/27/25 3804       General Information    Admission Type inpatient    Arrived From emergency department    Referral Source admission list    Reason for Consult discharge planning    Preferred Language English       Contact Information    Permission Granted to Share Info With                    Functional Status       Row Name 05/27/25 1629       Functional Status    Usual Activity Tolerance moderate    Current Activity Tolerance moderate       Functional Status, IADL    Medications independent    Meal Preparation independent    Housekeeping independent    Laundry independent    Shopping independent    If for any reason you need help with day-to-day activities such as bathing, preparing meals, shopping, managing finances, etc., do you get the help you need? I get all the help I need               Lucrecia Silva RN     Office:  [phone]

## 2025-05-27 NOTE — PLAN OF CARE
Goal Outcome Evaluation:   Remains on Room Air through the day, Amio gtt stopped and PO Amio started, switched to eliquis, x1 dose potassium, Potassium is now 4.3, IV ABT for UTI,Ambulate with walker/standby.         Progress: improving

## 2025-05-27 NOTE — PLAN OF CARE
Goal Outcome Evaluation:  Plan of Care Reviewed With: child, patient (all 3 adult children present)        Progress: improving  Outcome Evaluation: 89 yo female adm 5/25/25 for chest pain and a fib w/ rvr. Pt dx w/ nstemi (troponin 607 at max). Had cardiac cath yesterday, w/ 3 vessel severe cad found. Recommendation is for medical management. Pt remains on amio drip at this time. PMH: cad, hld, htn, a fib. At baseline, pt lives w/ her adult dtr, who is able to provide 24 hr care as needed. Dtr is retired. Pt is independent for ambulation at home and in community w/o assistive device. She does not use home O2. Lives in single level home w/ no stairs to enter from garage. Today, pt is alert, oriented, and very pleasant. She has moderate to severe limitation in her R shoulder ROM, but otherwise has full ROM. Has 5/5 strength in BLEs. Comes to stand w/ supervision, and then is able to amb 180 ft w/ supervision. No need for skilled PT. Recommend home w/ family at d/c. Will sign off.

## 2025-05-27 NOTE — PROGRESS NOTES
[facility] MEDICINE SERVICE  DAILY PROGRESS NOTE    NAME: Amanda Brown  : 6/10/1933  MRN: [id number]      LOS: 1 day     PROVIDER OF SERVICE: Deb Montes DO    Chief Complaint: Paroxysmal atrial fibrillation with rapid ventricular response    Subjective:     Interval History:  History taken from: patient    Patient seen and examined this morning.  Doing much better, denies any palpitations or dizziness.  Remains on amnio drip currently.  Cardiac catheter results from yesterday discussed with patient and family.  All questions answered.    Review of Systems:   Pertinent positives as noted in HPI/subjective.  All other systems were reviewed and are negative.      Objective:     Vital Signs  Temp:  [97.5 °F (36.4 °C)-98.8 °F (37.1 °C)] 98.5 °F (36.9 °C)  Heart Rate:  [] 86  Resp:  [13-21] 19  BP: (109-151)/(52-86) 129/83  Flow (L/min) (Oxygen Therapy):  [2] 2   Body mass index is 26.49 kg/m².    Physical Exam  General: Awake, alert, elderly female, lying in bed, NAD  Eyes: PERRL, EOMI, conjunctivae are clear  Cardiovascular: Regular rate and irregularly irregular rhythm, no murmurs  Respiratory: Clear to auscultation bilaterally, no wheezing or rales, unlabored breathing  Abdomen: Soft, nontender, positive bowel sounds, no guarding  Neurologic: A&O, CN grossly intact, moves all extremities spontaneously  Musculoskeletal: Normal range of motion, no other gross deformities  Skin: Warm, dry      Current Medications:  Scheduled Meds:amiodarone, 150 mg, Intravenous, Once   Followed by  amiodarone, 200 mg, Oral, Once   Followed by  amiodarone, 200 mg, Oral, Q8H   Followed by  [START ON 6/3/2025] amiodarone, 200 mg, Oral, Q12H   Followed by  [START ON 2025] amiodarone, 200 mg, Oral, Daily  aspirin, 81 mg, Oral, Daily  atorvastatin, 40 mg, Oral, Daily  cefTRIAXone, 1,000 mg, Intravenous, Q24H  [Held by provider] enoxaparin sodium, 1 mg/kg, Subcutaneous, Q24H  [Held by provider]  hydroCHLOROthiazide, 25 mg, Oral, Daily  [Held by provider] lisinopril, 20 mg, Oral, BID  metoprolol succinate XL, 25 mg, Oral, BID  timolol, 1 drop, Both Eyes, Daily      Continuous Infusions:amiodarone, 0.5 mg/min, Last Rate: 0.5 mg/min (05/27/25 0718)      PRN Meds:.  acetaminophen    atropine    senna-docusate sodium **AND** polyethylene glycol **AND** bisacodyl **AND** bisacodyl    Calcium Replacement - Follow Nurse / BPA Driven Protocol    diphenhydrAMINE    Magnesium Cardiology Dose Replacement - Follow Nurse / BPA Driven Protocol    nitroglycerin    nitroglycerin    ondansetron ODT **OR** ondansetron    Phosphorus Replacement - Follow Nurse / BPA Driven Protocol    Potassium Replacement - Follow Nurse / BPA Driven Protocol       Diagnostic Data    Results from last 7 days   Lab Units 05/27/25  0502 05/26/25  0543   WBC 10*3/mm3 8.31 9.08   HEMOGLOBIN g/dL 9.3* 10.1*   HEMATOCRIT % 29.1* 31.5*   PLATELETS 10*3/mm3 135* 153   GLUCOSE mg/dL 123* 110*   CREATININE mg/dL 0.94 1.17*   BUN mg/dL 13 16   SODIUM mmol/L 138 141   POTASSIUM mmol/L 3.8 4.2   AST (SGOT) U/L  --  86*   ALT (SGPT) U/L  --  40*   ALK PHOS U/L  --  89   BILIRUBIN mg/dL  --  0.5   ANION GAP mmol/L 8.0 10.9       CT Angiogram Chest Pulmonary Embolism  Result Date: 5/25/2025  Impression: No evidence of pulmonary embolism. Cardiomegaly and mild pulmonary edema with small bilateral pleural effusions. Electronically Signed: Arnaldo Napier MD  5/25/2025 4:05 PM EDT  Workstation ID: [id number]        I reviewed the patient's new clinical results.    Assessment/Plan:     Active and Resolved Problems  Active Hospital Problems    Diagnosis  POA    **Paroxysmal atrial fibrillation with rapid ventricular response [I48.0]  Yes    A-fib [I48.91]  Yes    Elevated troponin [R79.89]  Unknown    Paroxysmal SVT (supraventricular tachycardia) [I47.10]  Yes    Benign essential HTN [I10]  Yes    Coronary artery disease involving native coronary artery of native  heart without angina pectoris [I25.10]  Yes    Mixed hyperlipidemia [E78.2]  Yes      Resolved Hospital Problems   No resolved problems to display.       New onset Atrial Fibrillation with RVR  Hx SVT  -Remains on amiodarone currently, wean off as tolerated  -YNE1ME0-GQEr score of at least 5  -Lovenox on hold currently, resume anticoagulation when okay with cardiology  Cardiology following-    Type II MI  CAD s/p CABG  -s/p LHC on 5/26 showing patent t CABG hree-vessel  -Continue medical therapy    Mitral regurgitation  Chronic HFpEF  -Cardiology recommending EVERARDO for further evaluation  -Continue GDMT as tolerated     Abnormal UA  -urine culture grew mixed vini suggestive of contamination.  Finish 3 days of IV ceftriaxone, cannot rule out UTI    AUGUSTO  -Renal function is improving.  Continue to monitor.  -Nephrology following    VTE Prophylaxis:  Pharmacologic & mechanical VTE prophylaxis orders are present.    Disposition Planning:     Barriers to Discharge: Pending clinical improvement  Anticipated Date of Discharge: 5/28  Place of Discharge: Home      Code Status and Medical Interventions: CPR (Attempt to Resuscitate); Full Support   Ordered at: 05/25/25 1325     Code Status (Patient has no pulse and is not breathing):    CPR (Attempt to Resuscitate)     Medical Interventions (Patient has pulse or is breathing):    Full Support       Signature: Electronically signed by Deb Montes DO, 05/27/25, 10:26 EDT.  [facility] Hospitalist Team     Part of this note may be an electronic transcription/translation of spoken language to printed text using the Dragon Dictation System.

## 2025-05-27 NOTE — PROGRESS NOTES
"                                                                                                                                      Nephrology  Progress Note                                        [facility]    Patient Identification    Name: Amanda Brown  Age: 89 y.o.  Sex: female  :  6/10/1933  MRN: [id number]      DATE OF SERVICE:  2025        Subective     Resting   Family in the room     Objective   Scheduled Meds:[Transfer Hold] acetylcysteine, 600 mg, Oral, Q12H  amiodarone, 150 mg, Intravenous, Once   Followed by  amiodarone, 200 mg, Oral, Once   Followed by  amiodarone, 200 mg, Oral, Q8H   Followed by  [START ON 6/3/2025] amiodarone, 200 mg, Oral, Q12H   Followed by  [START ON 2025] amiodarone, 200 mg, Oral, Daily  aspirin, 81 mg, Oral, Daily  atorvastatin, 40 mg, Oral, Daily  cefTRIAXone, 1,000 mg, Intravenous, Q24H  [Held by provider] enoxaparin sodium, 1 mg/kg, Subcutaneous, Q24H  [Held by provider] hydroCHLOROthiazide, 25 mg, Oral, Daily  [Held by provider] lisinopril, 20 mg, Oral, BID  metoprolol succinate XL, 25 mg, Oral, BID  timolol, 1 drop, Both Eyes, Daily          Continuous Infusions:amiodarone, 0.5 mg/min, Last Rate: 0.5 mg/min (25)        PRN Meds:  acetaminophen    atropine    senna-docusate sodium **AND** polyethylene glycol **AND** bisacodyl **AND** bisacodyl    Calcium Replacement - Follow Nurse / BPA Driven Protocol    diphenhydrAMINE    Magnesium Cardiology Dose Replacement - Follow Nurse / BPA Driven Protocol    nitroglycerin    nitroglycerin    ondansetron ODT **OR** ondansetron    Phosphorus Replacement - Follow Nurse / BPA Driven Protocol    Potassium Replacement - Follow Nurse / BPA Driven Protocol     Exam:  /71   Pulse 106   Temp 98.8 °F (37.1 °C) (Oral)   Resp 13   Ht 154.9 cm (61\")   Wt 63.6 kg (140 lb 3.4 oz)   SpO2 100%   BMI 26.49 kg/m²     Intake/Output last 3 shifts:  I/O last 3 completed shifts:  In: 730 " [P.O.:730]  Out: -     Intake/Output this shift:  I/O this shift:  In: -   Out: 300 [Urine:300]    Physical exam:  General Appearance:  Alert  Head:  Normocephalic, without obvious abnormality, atraumatic  Eyes:  PERRL, conjunctiva/corneas clear     Neck:  Supple,  no adenopathy;      Lungs:  Decreased BS occasion ronchi  Heart:  Regular rate and rhythm, S1 and S2 normal  Abdomen:  Soft, non-tender, bowel sounds active   Extremities: trace edema  Pulses: 2+ and symmetric all extremities  Skin:  No rashes or lesions       Data Review:  All labs (24hrs):   Recent Results (from the past 24 hours)   ECG 12 Lead Drug Monitoring; Amiodarone    Collection Time: 05/26/25  3:53 PM   Result Value Ref Range    QT Interval 396 ms    QTC Interval 502 ms   Basic Metabolic Panel    Collection Time: 05/27/25  5:02 AM    Specimen: Blood   Result Value Ref Range    Glucose 123 (H) 65 - 99 mg/dL    BUN 13 8 - 23 mg/dL    Creatinine 0.94 0.57 - 1.00 mg/dL    Sodium 138 136 - 145 mmol/L    Potassium 3.8 3.5 - 5.2 mmol/L    Chloride 104 98 - 107 mmol/L    CO2 26.0 22.0 - 29.0 mmol/L    Calcium 8.3 8.2 - 9.6 mg/dL    BUN/Creatinine Ratio 13.8 7.0 - 25.0    Anion Gap 8.0 5.0 - 15.0 mmol/L    eGFR 57.4 (L) >60.0 mL/min/1.73   Magnesium    Collection Time: 05/27/25  5:02 AM    Specimen: Blood   Result Value Ref Range    Magnesium 2.1 1.7 - 2.3 mg/dL   Phosphorus    Collection Time: 05/27/25  5:02 AM    Specimen: Blood   Result Value Ref Range    Phosphorus 2.7 2.5 - 4.5 mg/dL   CBC Auto Differential    Collection Time: 05/27/25  5:02 AM    Specimen: Blood   Result Value Ref Range    WBC 8.31 3.40 - 10.80 10*3/mm3    RBC 3.06 (L) 3.77 - 5.28 10*6/mm3    Hemoglobin 9.3 (L) 12.0 - 15.9 g/dL    Hematocrit 29.1 (L) 34.0 - 46.6 %    MCV 95.1 79.0 - 97.0 fL    MCH 30.4 26.6 - 33.0 pg    MCHC 32.0 31.5 - 35.7 g/dL    RDW 13.2 12.3 - 15.4 %    RDW-SD 45.1 37.0 - 54.0 fl    MPV 11.3 6.0 - 12.0 fL    Platelets 135 (L) 140 - 450 10*3/mm3    Neutrophil %  70.2 42.7 - 76.0 %    Lymphocyte % 18.7 (L) 19.6 - 45.3 %    Monocyte % 8.1 5.0 - 12.0 %    Eosinophil % 2.4 0.3 - 6.2 %    Basophil % 0.4 0.0 - 1.5 %    Immature Grans % 0.2 0.0 - 0.5 %    Neutrophils, Absolute 5.84 1.70 - 7.00 10*3/mm3    Lymphocytes, Absolute 1.55 0.70 - 3.10 10*3/mm3    Monocytes, Absolute 0.67 0.10 - 0.90 10*3/mm3    Eosinophils, Absolute 0.20 0.00 - 0.40 10*3/mm3    Basophils, Absolute 0.03 0.00 - 0.20 10*3/mm3    Immature Grans, Absolute 0.02 0.00 - 0.05 10*3/mm3    nRBC 0.0 0.0 - 0.2 /100 WBC   ECG 12 Lead Drug Monitoring; Amiodarone    Collection Time: 05/27/25  6:12 AM   Result Value Ref Range    QT Interval 398 ms    QTC Interval 486 ms          Imaging:  CT Angiogram Chest Pulmonary Embolism  Result Date: 5/25/2025  Impression: No evidence of pulmonary embolism. Cardiomegaly and mild pulmonary edema with small bilateral pleural effusions. Electronically Signed: Arnaldo Napier MD  5/25/2025 4:05 PM EDT  Workstation ID: [id number]      Assessment/Plan:     Paroxysmal atrial fibrillation with rapid ventricular response    Coronary artery disease involving native coronary artery of native heart without angina pectoris    Mixed hyperlipidemia    Benign essential HTN    Paroxysmal SVT (supraventricular tachycardia)    A-fib    Elevated troponin         AUGUSTO/CKD  Afib  HTN  SVT  CAD s/p CABG  HLP  Edema     Recommendations:   Creatinine down to  0.9 from1.1   CTA was negative    Noted plans for cardiac catheterization   Patient is on prophylaxis  No need for IV fluid   Status post Mucomyst treatment   May need diuresis

## 2025-05-28 LAB
ANION GAP SERPL CALCULATED.3IONS-SCNC: 9 MMOL/L (ref 5–15)
BUN SERPL-MCNC: 14.3 MG/DL (ref 8–23)
BUN/CREAT SERPL: 14.9 (ref 7–25)
CALCIUM SPEC-SCNC: 8.6 MG/DL (ref 8.2–9.6)
CHLORIDE SERPL-SCNC: 103 MMOL/L (ref 98–107)
CO2 SERPL-SCNC: 26 MMOL/L (ref 22–29)
CREAT SERPL-MCNC: 0.96 MG/DL (ref 0.57–1)
EGFRCR SERPLBLD CKD-EPI 2021: 56 ML/MIN/1.73
GLUCOSE SERPL-MCNC: 124 MG/DL (ref 65–99)
POTASSIUM SERPL-SCNC: 4.1 MMOL/L (ref 3.5–5.2)
QT INTERVAL: 386 MS
QTC INTERVAL: 501 MS
SODIUM SERPL-SCNC: 138 MMOL/L (ref 136–145)

## 2025-05-28 PROCEDURE — 80048 BASIC METABOLIC PNL TOTAL CA: CPT | Performed by: INTERNAL MEDICINE

## 2025-05-28 PROCEDURE — 93010 ELECTROCARDIOGRAM REPORT: CPT | Performed by: INTERNAL MEDICINE

## 2025-05-28 PROCEDURE — 25010000002 FUROSEMIDE PER 20 MG

## 2025-05-28 PROCEDURE — 93005 ELECTROCARDIOGRAM TRACING: CPT | Performed by: INTERNAL MEDICINE

## 2025-05-28 PROCEDURE — 99232 SBSQ HOSP IP/OBS MODERATE 35: CPT | Performed by: INTERNAL MEDICINE

## 2025-05-28 RX ORDER — FUROSEMIDE 10 MG/ML
20 INJECTION INTRAMUSCULAR; INTRAVENOUS ONCE
Status: COMPLETED | OUTPATIENT
Start: 2025-05-28 | End: 2025-05-28

## 2025-05-28 RX ADMIN — METOPROLOL SUCCINATE 50 MG: 50 TABLET, EXTENDED RELEASE ORAL at 08:23

## 2025-05-28 RX ADMIN — FUROSEMIDE 20 MG: 10 INJECTION, SOLUTION INTRAMUSCULAR; INTRAVENOUS at 17:18

## 2025-05-28 RX ADMIN — METOPROLOL SUCCINATE 75 MG: 25 TABLET, EXTENDED RELEASE ORAL at 20:36

## 2025-05-28 RX ADMIN — AMIODARONE HYDROCHLORIDE 200 MG: 200 TABLET ORAL at 23:21

## 2025-05-28 RX ADMIN — AMIODARONE HYDROCHLORIDE 200 MG: 200 TABLET ORAL at 01:06

## 2025-05-28 RX ADMIN — AMIODARONE HYDROCHLORIDE 200 MG: 200 TABLET ORAL at 08:23

## 2025-05-28 RX ADMIN — AMIODARONE HYDROCHLORIDE 200 MG: 200 TABLET ORAL at 15:51

## 2025-05-28 RX ADMIN — TIMOLOL MALEATE 1 DROP: 5 SOLUTION OPHTHALMIC at 09:00

## 2025-05-28 RX ADMIN — SENNOSIDES AND DOCUSATE SODIUM 2 TABLET: 50; 8.6 TABLET ORAL at 08:28

## 2025-05-28 RX ADMIN — APIXABAN 2.5 MG: 2.5 TABLET, FILM COATED ORAL at 08:23

## 2025-05-28 RX ADMIN — ASPIRIN 81 MG: 81 TABLET, COATED ORAL at 08:23

## 2025-05-28 RX ADMIN — ATORVASTATIN CALCIUM 40 MG: 40 TABLET, FILM COATED ORAL at 08:23

## 2025-05-28 RX ADMIN — APIXABAN 2.5 MG: 2.5 TABLET, FILM COATED ORAL at 20:36

## 2025-05-28 NOTE — PLAN OF CARE
Problem: Adult Inpatient Plan of Care  Goal: Absence of Hospital-Acquired Illness or Injury  Intervention: Identify and Manage Fall Risk  Recent Flowsheet Documentation  Taken 5/28/2025 0401 by Analy Bob RN  Safety Promotion/Fall Prevention:   assistive device/personal items within reach   clutter free environment maintained   fall prevention program maintained   nonskid shoes/slippers when out of bed   room organization consistent   safety round/check completed  Taken 5/28/2025 0200 by Analy Bob RN  Safety Promotion/Fall Prevention:   assistive device/personal items within reach   clutter free environment maintained   fall prevention program maintained   nonskid shoes/slippers when out of bed   room organization consistent   safety round/check completed  Taken 5/28/2025 0001 by Analy Bob RN  Safety Promotion/Fall Prevention:   assistive device/personal items within reach   clutter free environment maintained   fall prevention program maintained   nonskid shoes/slippers when out of bed   room organization consistent   safety round/check completed  Taken 5/27/2025 2200 by Analy Bob RN  Safety Promotion/Fall Prevention:   assistive device/personal items within reach   clutter free environment maintained   nonskid shoes/slippers when out of bed   room organization consistent   safety round/check completed  Taken 5/27/2025 2001 by Analy Bob RN  Safety Promotion/Fall Prevention:   assistive device/personal items within reach   clutter free environment maintained   nonskid shoes/slippers when out of bed   room organization consistent   safety round/check completed  Intervention: Prevent Skin Injury  Recent Flowsheet Documentation  Taken 5/27/2025 2001 by Analy Bob RN  Body Position: position changed independently  Skin Protection:   incontinence pads utilized   transparent dressing maintained  Intervention: Prevent and Manage VTE (Venous Thromboembolism) Risk  Recent  Flowsheet Documentation  Taken 5/28/2025 0401 by Analy Bob RN  VTE Prevention/Management:   SCDs (sequential compression devices) off   patient refused intervention  Taken 5/28/2025 0001 by Analy Bob RN  VTE Prevention/Management:   SCDs (sequential compression devices) off   patient refused intervention  Taken 5/27/2025 2001 by Analy Bob RN  VTE Prevention/Management:   SCDs (sequential compression devices) off   patient refused intervention  Intervention: Prevent Infection  Recent Flowsheet Documentation  Taken 5/27/2025 2200 by Analy Bob RN  Infection Prevention:   environmental surveillance performed   hand hygiene promoted   personal protective equipment utilized   single patient room provided  Taken 5/27/2025 2001 by Analy Bob RN  Infection Prevention:   environmental surveillance performed   hand hygiene promoted   personal protective equipment utilized   single patient room provided  Goal: Optimal Comfort and Wellbeing  Intervention: Provide Person-Centered Care  Recent Flowsheet Documentation  Taken 5/27/2025 2001 by Analy Bob RN  Trust Relationship/Rapport:   care explained   choices provided   questions answered   questions encouraged   reassurance provided     Problem: Comorbidity Management  Goal: Blood Pressure in Desired Range  Intervention: Maintain Blood Pressure Management  Recent Flowsheet Documentation  Taken 5/27/2025 2001 by Analy Bob RN  Medication Review/Management: medications reviewed     Problem: Dysrhythmia  Goal: Normalized Cardiac Rhythm  Intervention: Monitor and Manage Cardiac Rhythm Effect  Recent Flowsheet Documentation  Taken 5/28/2025 0401 by Analy Bob RN  VTE Prevention/Management:   SCDs (sequential compression devices) off   patient refused intervention  Taken 5/28/2025 0001 by Analy Bob RN  VTE Prevention/Management:   SCDs (sequential compression devices) off   patient refused intervention  Taken  5/27/2025 2001 by Analy Bob RN  VTE Prevention/Management:   SCDs (sequential compression devices) off   patient refused intervention     Problem: Fall Injury Risk  Goal: Absence of Fall and Fall-Related Injury  Intervention: Identify and Manage Contributors  Recent Flowsheet Documentation  Taken 5/27/2025 2001 by Analy Bob RN  Medication Review/Management: medications reviewed  Intervention: Promote Injury-Free Environment  Recent Flowsheet Documentation  Taken 5/28/2025 0401 by Analy Bob RN  Safety Promotion/Fall Prevention:   assistive device/personal items within reach   clutter free environment maintained   fall prevention program maintained   nonskid shoes/slippers when out of bed   room organization consistent   safety round/check completed  Taken 5/28/2025 0200 by Analy Bob RN  Safety Promotion/Fall Prevention:   assistive device/personal items within reach   clutter free environment maintained   fall prevention program maintained   nonskid shoes/slippers when out of bed   room organization consistent   safety round/check completed  Taken 5/28/2025 0001 by Analy Bob RN  Safety Promotion/Fall Prevention:   assistive device/personal items within reach   clutter free environment maintained   fall prevention program maintained   nonskid shoes/slippers when out of bed   room organization consistent   safety round/check completed  Taken 5/27/2025 2200 by Analy Bob RN  Safety Promotion/Fall Prevention:   assistive device/personal items within reach   clutter free environment maintained   nonskid shoes/slippers when out of bed   room organization consistent   safety round/check completed  Taken 5/27/2025 2001 by Analy Bob RN  Safety Promotion/Fall Prevention:   assistive device/personal items within reach   clutter free environment maintained   nonskid shoes/slippers when out of bed   room organization consistent   safety round/check completed   Goal Outcome  Evaluation:

## 2025-05-28 NOTE — PLAN OF CARE
Goal Outcome Evaluation:   Remains on room air. Continues PO Amio with rate controlled, 1x dose of lasixs 20mg iv, increased metoprolol 75mg po BID.        Progress: improving

## 2025-05-28 NOTE — PROGRESS NOTES
"                                                                                                                                      Nephrology  Progress Note                                        Patient Identification    Name: Amanda Brown  Age: 89 y.o.  Sex: female  :  6/10/1933  MRN: [id number]      DATE OF SERVICE:  2025        Seen for AUGUSTO/CKD    Subective    Events noted, patient seen sitting in chair on RA, feels ok, no sob or chest pain.  Family in the room     Objective   Scheduled Meds:amiodarone, 150 mg, Intravenous, Once   Followed by  amiodarone, 200 mg, Oral, Q8H   Followed by  [START ON 6/3/2025] amiodarone, 200 mg, Oral, Q12H   Followed by  [START ON 2025] amiodarone, 200 mg, Oral, Daily  apixaban, 2.5 mg, Oral, Q12H  aspirin, 81 mg, Oral, Daily  atorvastatin, 40 mg, Oral, Daily  [Held by provider] hydroCHLOROthiazide, 25 mg, Oral, Daily  [Held by provider] lisinopril, 20 mg, Oral, BID  metoprolol succinate XL, 75 mg, Oral, BID  timolol, 1 drop, Both Eyes, Daily          Continuous Infusions:       PRN Meds:  acetaminophen    atropine    senna-docusate sodium **AND** polyethylene glycol **AND** bisacodyl **AND** bisacodyl    Calcium Replacement - Follow Nurse / BPA Driven Protocol    diphenhydrAMINE    Magnesium Cardiology Dose Replacement - Follow Nurse / BPA Driven Protocol    nitroglycerin    nitroglycerin    ondansetron ODT **OR** ondansetron    Phosphorus Replacement - Follow Nurse / BPA Driven Protocol    Potassium Replacement - Follow Nurse / BPA Driven Protocol     Exam:  /78 (BP Location: Right arm, Patient Position: Sitting)   Pulse 100   Temp 97.8 °F (36.6 °C) (Oral)   Resp 23   Ht 154.9 cm (61\")   Wt 64 kg (141 lb 1.5 oz)   SpO2 96%   BMI 26.66 kg/m²     Intake/Output last 3 shifts:  I/O last 3 completed shifts:  In: -   Out: 300 [Urine:300]    Intake/Output this shift:  No intake/output data recorded.    Physical exam:  General Appearance:  Alert  Head:  " Normocephalic, without obvious abnormality, atraumatic  Eyes:  PERRL, conjunctiva/corneas clear     Neck:  Supple,  no adenopathy;      Lungs:  Decreased BS occasion ronchi  Heart:  Regular rate and rhythm, S1 and S2 normal  Abdomen:  Soft, non-tender, bowel sounds active   Extremities: trace edema  Pulses: 2+ and symmetric all extremities  Skin:  No rashes or lesions       Data Review:  All labs (24hrs):   Recent Results (from the past 24 hours)   Basic Metabolic Panel    Collection Time: 05/28/25  4:35 AM    Specimen: Arm, Left; Blood   Result Value Ref Range    Glucose 124 (H) 65 - 99 mg/dL    BUN 14.3 8.0 - 23.0 mg/dL    Creatinine 0.96 0.57 - 1.00 mg/dL    Sodium 138 136 - 145 mmol/L    Potassium 4.1 3.5 - 5.2 mmol/L    Chloride 103 98 - 107 mmol/L    CO2 26.0 22.0 - 29.0 mmol/L    Calcium 8.6 8.2 - 9.6 mg/dL    BUN/Creatinine Ratio 14.9 7.0 - 25.0    Anion Gap 9.0 5.0 - 15.0 mmol/L    eGFR 56.0 (L) >60.0 mL/min/1.73   ECG 12 Lead Drug Monitoring; Amiodarone    Collection Time: 05/28/25  5:39 AM   Result Value Ref Range    QT Interval 386 ms    QTC Interval 501 ms          Imaging:  CT Angiogram Chest Pulmonary Embolism  Result Date: 5/25/2025  Impression: No evidence of pulmonary embolism. Cardiomegaly and mild pulmonary edema with small bilateral pleural effusions. Electronically Signed: Arnaldo Napier MD  5/25/2025 4:05 PM EDT  Workstation ID: [id number]      Assessment/Plan:     Paroxysmal atrial fibrillation with rapid ventricular response    Coronary artery disease involving native coronary artery of native heart without angina pectoris    Mixed hyperlipidemia    Benign essential HTN    Paroxysmal SVT (supraventricular tachycardia)    A-fib    Elevated troponin         AUGUSTO/CKD  Afib  HTN  SVT  CAD s/p CABG  HLP  Edema     Recommendations:  Labs noted, Cr stable 0.9, lytes ok  CTA was negative   Avoid nephrotoxins   No need for IV fluid   Status post Mucomyst treatment   Ok for PRN diuresis  Monitor labs

## 2025-05-28 NOTE — PROGRESS NOTES
Cardiology [facility]        LOS:  LOS: 2 days   Patient Name: Amanda Brown  Age/Sex: 89 y.o. female  : 6/10/1933  MRN: [id number]    Day of Service: 25   Length of Stay: 2  Encounter Provider: ADEEL Oconnell  Place of Service: [facility] CARDIOLOGY  Patient Care Team:  Ranulfo Mims MD as PCP - General (Internal Medicine)  Deep Aceves MD as Consulting Physician (Cardiology)    Subjective:     Chief Complaint: f/u Afib, elevated troponin    Subjective: remains on amiodarone 200 mg p.o. every 8.  Rates remain greater than 100, metoprolol increased to 75 mg twice daily remains on Eliquis 2.5 mg every 12.  Patient seen as she is sitting up in recliner with her feet in dependent position, bilateral lower extremity edema noted.  Patient encouraged to keep feet elevated.  Dr. Aceves recommended gentle diuresis yesterday, nephrology today states okay with as needed diuresis.  Hydrochlorothiazide has been held.    Current Medications:   Scheduled Meds:amiodarone, 150 mg, Intravenous, Once   Followed by  amiodarone, 200 mg, Oral, Q8H   Followed by  [START ON 6/3/2025] amiodarone, 200 mg, Oral, Q12H   Followed by  [START ON 2025] amiodarone, 200 mg, Oral, Daily  apixaban, 2.5 mg, Oral, Q12H  aspirin, 81 mg, Oral, Daily  atorvastatin, 40 mg, Oral, Daily  [Held by provider] hydroCHLOROthiazide, 25 mg, Oral, Daily  [Held by provider] lisinopril, 20 mg, Oral, BID  metoprolol succinate XL, 75 mg, Oral, BID  timolol, 1 drop, Both Eyes, Daily      Continuous Infusions:       Allergies:  No Known Allergies    Review of Systems   Constitutional: Negative for chills and fever.   HENT:  Negative for ear discharge and nosebleeds.    Eyes:  Negative for discharge and redness.   Cardiovascular:  Negative for chest pain, orthopnea, palpitations, paroxysmal nocturnal dyspnea and syncope.   Respiratory:  Positive for shortness of breath. Negative for cough and wheezing.     Endocrine: Negative for heat intolerance.   Skin:  Negative for rash.   Musculoskeletal:  Negative for arthritis and myalgias.   Gastrointestinal:  Negative for abdominal pain, melena, nausea and vomiting.   Genitourinary:  Negative for dysuria and hematuria.   Neurological:  Negative for dizziness, light-headedness, numbness and tremors.   Psychiatric/Behavioral:  Negative for depression. The patient is not nervous/anxious.          Objective:     Temp:  [97.6 °F (36.4 °C)-98.7 °F (37.1 °C)] 97.8 °F (36.6 °C)  Heart Rate:  [100-116] 100  Resp:  [17-23] 23  BP: (122-151)/(77-91) 122/78   No intake or output data in the 24 hours ending 05/28/25 1547    Body mass index is 26.66 kg/m².      05/26/25  1456 05/27/25  0354 05/28/25  0429   Weight: 63 kg (138 lb 14.2 oz) 63.6 kg (140 lb 3.4 oz) 64 kg (141 lb 1.5 oz)         General Appearance:    Alert, cooperative, in no acute distress                                Head: Atraumatic, normocephalic, PERRLA               Neck:   supple,  no JVD   Lungs:     Clear to auscultation, respirations regular, even and               unlabored    Heart:    irregular rhythm and normal rate, normal S1 and S2   Abdomen:     Normal bowel sounds, no masses, soft  nontender, nondistended, no guarding, no rebound  tenderness   Extremities:   Moves all extremities well, 1+ BLE edema, no cyanosis, no  redness   Pulses:   Pulses palpable and equal bilaterally   Skin:   No bleeding, bruising or rash   Neurologic:   Awake, oriented x3, [place]         Lab Review:   Results from last 7 days   Lab Units 05/28/25  0435 05/27/25  1156 05/27/25  0502 05/26/25  0543   SODIUM mmol/L 138  --  138 141   POTASSIUM mmol/L 4.1 4.3 3.8 4.2   CHLORIDE mmol/L 103  --  104 104   CO2 mmol/L 26.0  --  26.0 26.1   BUN mg/dL 14.3  --  13 16   CREATININE mg/dL 0.96  --  0.94 1.17*   GLUCOSE mg/dL 124*  --  123* 110*   CALCIUM mg/dL 8.6  --  8.3 8.9   AST (SGOT) U/L  --   --   --  86*   ALT (SGPT) U/L  --   --   --   40*     Results from last 7 days   Lab Units 05/26/25  0543 05/25/25  1454 05/25/25  1104 05/25/25  0947   HSTROP T ng/L 607* 394* 122* 76*     Results from last 7 days   Lab Units 05/27/25  0502 05/26/25  0543   WBC 10*3/mm3 8.31 9.08   HEMOGLOBIN g/dL 9.3* 10.1*   HEMATOCRIT % 29.1* 31.5*   PLATELETS 10*3/mm3 135* 153     Results from last 7 days   Lab Units 05/25/25  0947   INR  1.21*     Results from last 7 days   Lab Units 05/27/25  0502 05/26/25  0543   MAGNESIUM mg/dL 2.1 2.2     Results from last 7 days   Lab Units 05/25/25  0947   CHOLESTEROL mg/dL 139   TRIGLYCERIDES mg/dL 61   HDL CHOL mg/dL 45     Results from last 7 days   Lab Units 05/25/25  0947   PROBNP pg/mL 3,092.0*     Results from last 7 days   Lab Units 05/25/25  0947   TSH uIU/mL 4.390*       Recent Radiology:  Imaging Results (Most Recent)       Procedure Component Value Units Date/Time    XR Outside Chest [[id number]] Resulted: 05/27/25 1003     Updated: 05/27/25 1003    Narrative:      This procedure was auto-finalized with no dictation required.    CT Angiogram Chest Pulmonary Embolism [[id number]] Collected: 05/25/25 1602     Updated: 05/25/25 1607    Narrative:      CT ANGIOGRAM CHEST PULMONARY EMBOLISM    Date of Exam: 5/25/2025 3:40 PM EDT    Indication: elevated ddimer at OSH.    Comparison: Chest radiograph 1/7/2024.    Technique: Axial CT images were obtained of the chest after the uneventful intravenous administration of iodinated contrast utilizing pulmonary embolism protocol.  In addition, a 3-D volume rendered image was created for interpretation.  Sagittal and   coronal reconstructions were performed.  Automated exposure control and iterative reconstruction methods were used.      Findings:    Thyroid and thoracic inlet: No significant abnormality.    Lymph nodes: No pathologic appearing lymph nodes by imaging criteria.    Cardiovascular: Cardiomegaly. No pericardial effusion. Aorta and main pulmonary artery diameters are within  normal range.. Coronary artery calcifications. Status post CABG. Aortic atherosclerosis.. No evidence of pulmonary embolism.    Esophagus: Small hiatal hernia.    Lung parenchyma: Scattered atelectasis including in the lower lobes and lingula. Mild interlobular septal thickening and bilateral hazy opacification.    Airways: Patent trachea and mainstem bronchi.    Pleura: Small bilateral pleural effusions. No pneumothorax.    Chest wall and osseous structures: Degenerative changes of the imaged spine. No acute osseous abnormality.    Included abdomen: Cholelithiasis. Right adrenal hypoattenuating lesion measuring 14 mm, likely an adenoma.      Impression:      Impression:  No evidence of pulmonary embolism.    Cardiomegaly and mild pulmonary edema with small bilateral pleural effusions.      Electronically Signed: Arnaldo Napier MD    5/25/2025 4:05 PM EDT    Workstation ID: [id number]            ECHOCARDIOGRAM:    Results for orders placed during the hospital encounter of 05/25/25    Adult Transthoracic Echo Complete w/ Color, Spectral and Contrast if necessary per protocol    Interpretation Summary    Left ventricular ejection fraction appears to be 56 - 60%.    Left ventricular diastolic function is consistent with (grade III w/high LAP) reversible restrictive pattern.    The left atrial cavity is moderately dilated.    Moderate to severe mitral valve regurgitation is present.    Moderate tricuspid valve regurgitation is present.    Estimated right ventricular systolic pressure from tricuspid regurgitation is moderately elevated (45-55 mmHg).        I reviewed the patient's new clinical results.    EKG:      Assessment:       Paroxysmal atrial fibrillation with rapid ventricular response    Coronary artery disease involving native coronary artery of native heart without angina pectoris    Mixed hyperlipidemia    Benign essential HTN    Paroxysmal SVT (supraventricular tachycardia)    A-fib    Elevated  troponin    A-fib RVR  [id number] score is 5  On Eliquis  Toprol-XL for rate control  On amiodarone     Non-ST elevation MI  History of coronary artery disease with CABG in 2009, unknown anatomy  High-sensitivity troponin up to 600  S/p [facility] Severe three-vessel native obstructive coronary disease  Patent LIMA to LAD, vein graft to LCx and vein graft to RCA  Elevated LVEDP  Continue aspirin, high intensity statin and beta-blocker.     HFpEF  Mitral regurgitation  proBNP 3000  HFpEF secondary to atrial fibrillation, coronary artery disease, mitral regurgitation  Echocardiogram shows preserved LV function, grade 3 diastolic dysfunction and severe mitral regurgitation with pulmonary hypertension  Diuretics based on cardiac catheterization and volume assessment  Monitor I's and O's and replace electrolytes as needed  Consider adding Jardiance  MR is also worsening atrial fibrillation  Recommend elective EVERARDO to further evaluate mitral regurgitation when she recovers from this episode.     Hypertension  Blood pressures currently normal  Continue beta-blocker     Diabetes  A1c 6.24  Insulin sliding scale during inpatient stay     Hyperlipidemia  Goal LDL less than 55  Continue high intensity statin     AUGUSTO  Renal function is improving  Closely monitor renal function while on diuretics     Transaminitis  AST/ALT 86/40  May be secondary to congestion       Plan:   Patient remains in Afib, rates variable-- oral amio started, increase beta blockers  Continue diuresis  Outpt EVERARDO to assess mitral valve outpt +/- cardioversion  [facility] showed patent grafts- troponin 394-600s- no chest pain  Low-dose Eliquis has been initiated with no reports of bleeding    Patient is seen and examined and findings are verified.  All data is reviewed by me personally.  Assessment and plan formulated by APC was done after discussion with attending.  I spent more than 50% of time in taking care of the patient.    Patient is overall stable.  Denies any  chest pain.    Normal S1 and S2.  No pericardial rub or murmur abdominal exam is benign no leg edema noted.    I would increase Lopressor to 75 mg twice daily.  Continue amiodarone.  Anticipate discharge tomorrow.    Electronically signed by Deep Aceves MD, 05/28/25, 4:05 PM EDT.        Leticia Villaseñor, ADEEL  05/28/25  15:47 EDT

## 2025-05-28 NOTE — PROGRESS NOTES
[facility] MEDICINE SERVICE  DAILY PROGRESS NOTE    NAME: Amanda Brown  : 6/10/1933  MRN: [id number]      LOS: 2 days     PROVIDER OF SERVICE: Deb Montes DO    Chief Complaint: Paroxysmal atrial fibrillation with rapid ventricular response    Subjective:     Interval History:  History taken from: patient    Patient seen and examined this morning.  Doing well this morning, off amnio drip yesterday.  Overall improving.    Review of Systems:   Pertinent positives as noted in HPI/subjective.  All other systems were reviewed and are negative.      Objective:     Vital Signs  Temp:  [97.6 °F (36.4 °C)-98.8 °F (37.1 °C)] 97.8 °F (36.6 °C)  Heart Rate:  [100-116] 100  Resp:  [17-25] 23  BP: (122-151)/(67-91) 122/78   Body mass index is 26.66 kg/m².    Physical Exam  General: Awake, alert, elderly female, lying in bed, NAD  Eyes: PERRL, EOMI, conjunctivae are clear  Cardiovascular: Regular rate and irregularly irregular rhythm, no murmurs  Respiratory: Clear to auscultation bilaterally, no wheezing or rales, unlabored breathing  Abdomen: Soft, nontender, positive bowel sounds, no guarding  Skin: Warm, dry      Current Medications:  Scheduled Meds:amiodarone, 150 mg, Intravenous, Once   Followed by  amiodarone, 200 mg, Oral, Q8H   Followed by  [START ON 6/3/2025] amiodarone, 200 mg, Oral, Q12H   Followed by  [START ON 2025] amiodarone, 200 mg, Oral, Daily  apixaban, 2.5 mg, Oral, Q12H  aspirin, 81 mg, Oral, Daily  atorvastatin, 40 mg, Oral, Daily  [Held by provider] hydroCHLOROthiazide, 25 mg, Oral, Daily  [Held by provider] lisinopril, 20 mg, Oral, BID  metoprolol succinate XL, 50 mg, Oral, BID  timolol, 1 drop, Both Eyes, Daily      Continuous Infusions:     PRN Meds:.  acetaminophen    atropine    senna-docusate sodium **AND** polyethylene glycol **AND** bisacodyl **AND** bisacodyl    Calcium Replacement - Follow Nurse / BPA Driven Protocol    diphenhydrAMINE    Magnesium Cardiology Dose  Replacement - Follow Nurse / BPA Driven Protocol    nitroglycerin    nitroglycerin    ondansetron ODT **OR** ondansetron    Phosphorus Replacement - Follow Nurse / BPA Driven Protocol    Potassium Replacement - Follow Nurse / BPA Driven Protocol       Diagnostic Data    Results from last 7 days   Lab Units 05/28/25  0435 05/27/25  1156 05/27/25  0502 05/26/25  0543   WBC 10*3/mm3  --   --  8.31 9.08   HEMOGLOBIN g/dL  --   --  9.3* 10.1*   HEMATOCRIT %  --   --  29.1* 31.5*   PLATELETS 10*3/mm3  --   --  135* 153   GLUCOSE mg/dL 124*  --  123* 110*   CREATININE mg/dL 0.96  --  0.94 1.17*   BUN mg/dL 14.3  --  13 16   SODIUM mmol/L 138  --  138 141   POTASSIUM mmol/L 4.1   < > 3.8 4.2   AST (SGOT) U/L  --   --   --  86*   ALT (SGPT) U/L  --   --   --  40*   ALK PHOS U/L  --   --   --  89   BILIRUBIN mg/dL  --   --   --  0.5   ANION GAP mmol/L 9.0  --  8.0 10.9    < > = values in this interval not displayed.       No radiology results for the last day        I reviewed the patient's new clinical results.    Assessment/Plan:     Active and Resolved Problems  Active Hospital Problems    Diagnosis  POA    **Paroxysmal atrial fibrillation with rapid ventricular response [I48.0]  Yes    A-fib [I48.91]  Yes    Elevated troponin [R79.89]  Unknown    Paroxysmal SVT (supraventricular tachycardia) [I47.10]  Yes    Benign essential HTN [I10]  Yes    Coronary artery disease involving native coronary artery of native heart without angina pectoris [I25.10]  Yes    Mixed hyperlipidemia [E78.2]  Yes      Resolved Hospital Problems   No resolved problems to display.       New onset Atrial Fibrillation with RVR  Hx SVT  -Remains on amiodarone currently, wean off as tolerated  -[id number] score of at least 5  -Started on Eliquis per cardiology  -Cardiology following    Type II MI  CAD s/p CABG  -s/p LHC on 5/26 showing patent t CABG hree-vessel  -Continue medical therapy    Mitral regurgitation  Chronic HFpEF  -Cardiology recommending  EVERARDO for further evaluation, likely done as outpatient  -Continue GDMT as tolerated     Abnormal UA  -urine culture grew mixed vini suggestive of contamination.  Finish 3 days of IV ceftriaxone, cannot rule out UTI    AUGUSTO  -Renal function is improving.  Continue to monitor.  -Nephrology following    VTE Prophylaxis:  Pharmacologic & mechanical VTE prophylaxis orders are present.    Disposition Planning:     Barriers to Discharge: Pending clinical improvement  Anticipated Date of Discharge: 5/29  Place of Discharge: Home      Code Status and Medical Interventions: CPR (Attempt to Resuscitate); Full Support   Ordered at: 05/25/25 1325     Code Status (Patient has no pulse and is not breathing):    CPR (Attempt to Resuscitate)     Medical Interventions (Patient has pulse or is breathing):    Full Support       Signature: Electronically signed by Deb Montes DO, 05/28/25, 11:11 EDT.  [facility] Hospitalist Team     Part of this note may be an electronic transcription/translation of spoken language to printed text using the Dragon Dictation System.

## 2025-05-29 VITALS
RESPIRATION RATE: 18 BRPM | OXYGEN SATURATION: 97 % | TEMPERATURE: 98.8 F | DIASTOLIC BLOOD PRESSURE: 82 MMHG | HEIGHT: 61 IN | HEART RATE: 111 BPM | BODY MASS INDEX: 26.64 KG/M2 | WEIGHT: 141.09 LBS | SYSTOLIC BLOOD PRESSURE: 150 MMHG

## 2025-05-29 LAB
ANION GAP SERPL CALCULATED.3IONS-SCNC: 12.2 MMOL/L (ref 5–15)
BUN SERPL-MCNC: 16.1 MG/DL (ref 8–23)
BUN/CREAT SERPL: 14.9 (ref 7–25)
CALCIUM SPEC-SCNC: 8.8 MG/DL (ref 8.2–9.6)
CHLORIDE SERPL-SCNC: 105 MMOL/L (ref 98–107)
CO2 SERPL-SCNC: 25.8 MMOL/L (ref 22–29)
CREAT SERPL-MCNC: 1.08 MG/DL (ref 0.57–1)
EGFRCR SERPLBLD CKD-EPI 2021: 48.6 ML/MIN/1.73
GLUCOSE SERPL-MCNC: 121 MG/DL (ref 65–99)
POTASSIUM SERPL-SCNC: 4 MMOL/L (ref 3.5–5.2)
QT INTERVAL: 395 MS
QTC INTERVAL: 526 MS
SODIUM SERPL-SCNC: 143 MMOL/L (ref 136–145)

## 2025-05-29 PROCEDURE — 80048 BASIC METABOLIC PNL TOTAL CA: CPT | Performed by: INTERNAL MEDICINE

## 2025-05-29 PROCEDURE — 99232 SBSQ HOSP IP/OBS MODERATE 35: CPT | Performed by: NURSE PRACTITIONER

## 2025-05-29 PROCEDURE — 93010 ELECTROCARDIOGRAM REPORT: CPT | Performed by: INTERNAL MEDICINE

## 2025-05-29 PROCEDURE — 93005 ELECTROCARDIOGRAM TRACING: CPT | Performed by: INTERNAL MEDICINE

## 2025-05-29 RX ORDER — METOPROLOL SUCCINATE 25 MG/1
75 TABLET, EXTENDED RELEASE ORAL 2 TIMES DAILY
Qty: 200 TABLET | Refills: 0 | Status: SHIPPED | OUTPATIENT
Start: 2025-05-29

## 2025-05-29 RX ORDER — AMIODARONE HYDROCHLORIDE 200 MG/1
TABLET ORAL
Qty: 120 TABLET | Refills: 0 | Status: SHIPPED | OUTPATIENT
Start: 2025-05-29

## 2025-05-29 RX ADMIN — METOPROLOL SUCCINATE 75 MG: 25 TABLET, EXTENDED RELEASE ORAL at 08:17

## 2025-05-29 RX ADMIN — ATORVASTATIN CALCIUM 40 MG: 40 TABLET, FILM COATED ORAL at 08:17

## 2025-05-29 RX ADMIN — TIMOLOL MALEATE 1 DROP: 5 SOLUTION OPHTHALMIC at 09:00

## 2025-05-29 RX ADMIN — APIXABAN 2.5 MG: 2.5 TABLET, FILM COATED ORAL at 08:17

## 2025-05-29 RX ADMIN — AMIODARONE HYDROCHLORIDE 200 MG: 200 TABLET ORAL at 06:42

## 2025-05-29 RX ADMIN — ASPIRIN 81 MG: 81 TABLET, COATED ORAL at 08:17

## 2025-05-29 RX ADMIN — AMIODARONE HYDROCHLORIDE 200 MG: 200 TABLET ORAL at 14:24

## 2025-05-29 NOTE — PLAN OF CARE
Problem: Adult Inpatient Plan of Care  Goal: Absence of Hospital-Acquired Illness or Injury  Intervention: Identify and Manage Fall Risk  Recent Flowsheet Documentation  Taken 5/29/2025 0410 by Analy Bob RN  Safety Promotion/Fall Prevention:   assistive device/personal items within reach   clutter free environment maintained   fall prevention program maintained   nonskid shoes/slippers when out of bed   room organization consistent   safety round/check completed  Taken 5/29/2025 0200 by Analy Bob RN  Safety Promotion/Fall Prevention:   assistive device/personal items within reach   clutter free environment maintained   fall prevention program maintained   nonskid shoes/slippers when out of bed   room organization consistent   safety round/check completed  Taken 5/29/2025 0001 by Analy Bob RN  Safety Promotion/Fall Prevention:   assistive device/personal items within reach   clutter free environment maintained   fall prevention program maintained   nonskid shoes/slippers when out of bed   room organization consistent   safety round/check completed  Taken 5/28/2025 2156 by Analy Bob RN  Safety Promotion/Fall Prevention:   assistive device/personal items within reach   clutter free environment maintained   nonskid shoes/slippers when out of bed   room organization consistent   safety round/check completed  Taken 5/28/2025 2001 by Analy Bob RN  Safety Promotion/Fall Prevention:   assistive device/personal items within reach   clutter free environment maintained   nonskid shoes/slippers when out of bed   room organization consistent   safety round/check completed  Intervention: Prevent Skin Injury  Recent Flowsheet Documentation  Taken 5/28/2025 2001 by Analy Bob RN  Body Position: position changed independently  Skin Protection:   incontinence pads utilized   transparent dressing maintained  Intervention: Prevent and Manage VTE (Venous Thromboembolism) Risk  Recent  Flowsheet Documentation  Taken 5/29/2025 0410 by Analy Bob RN  VTE Prevention/Management:   patient refused intervention   SCDs (sequential compression devices) off  Taken 5/29/2025 0001 by Analy Bob RN  VTE Prevention/Management:   patient refused intervention   SCDs (sequential compression devices) off  Taken 5/28/2025 2001 by Analy Bob RN  VTE Prevention/Management:   patient refused intervention   SCDs (sequential compression devices) off  Intervention: Prevent Infection  Recent Flowsheet Documentation  Taken 5/28/2025 2156 by Analy Bob RN  Infection Prevention:   environmental surveillance performed   hand hygiene promoted   personal protective equipment utilized   single patient room provided  Taken 5/28/2025 2001 by Analy Bob RN  Infection Prevention:   environmental surveillance performed   hand hygiene promoted   personal protective equipment utilized   single patient room provided  Goal: Optimal Comfort and Wellbeing  Intervention: Provide Person-Centered Care  Recent Flowsheet Documentation  Taken 5/28/2025 2001 by Analy Bob RN  Trust Relationship/Rapport:   care explained   choices provided   questions answered   questions encouraged   reassurance provided     Problem: Comorbidity Management  Goal: Blood Pressure in Desired Range  Intervention: Maintain Blood Pressure Management  Recent Flowsheet Documentation  Taken 5/28/2025 2001 by Analy Bob RN  Medication Review/Management: medications reviewed     Problem: Dysrhythmia  Goal: Normalized Cardiac Rhythm  Intervention: Monitor and Manage Cardiac Rhythm Effect  Recent Flowsheet Documentation  Taken 5/29/2025 0410 by Analy Bob RN  VTE Prevention/Management:   patient refused intervention   SCDs (sequential compression devices) off  Taken 5/29/2025 0001 by Analy Bob RN  VTE Prevention/Management:   patient refused intervention   SCDs (sequential compression devices) off  Taken  5/28/2025 2001 by Analy Bob RN  VTE Prevention/Management:   patient refused intervention   SCDs (sequential compression devices) off     Problem: Fall Injury Risk  Goal: Absence of Fall and Fall-Related Injury  Intervention: Identify and Manage Contributors  Recent Flowsheet Documentation  Taken 5/28/2025 2001 by Analy Bob RN  Medication Review/Management: medications reviewed  Intervention: Promote Injury-Free Environment  Recent Flowsheet Documentation  Taken 5/29/2025 0410 by Analy Bob RN  Safety Promotion/Fall Prevention:   assistive device/personal items within reach   clutter free environment maintained   fall prevention program maintained   nonskid shoes/slippers when out of bed   room organization consistent   safety round/check completed  Taken 5/29/2025 0200 by Analy Bob RN  Safety Promotion/Fall Prevention:   assistive device/personal items within reach   clutter free environment maintained   fall prevention program maintained   nonskid shoes/slippers when out of bed   room organization consistent   safety round/check completed  Taken 5/29/2025 0001 by Analy Bob RN  Safety Promotion/Fall Prevention:   assistive device/personal items within reach   clutter free environment maintained   fall prevention program maintained   nonskid shoes/slippers when out of bed   room organization consistent   safety round/check completed  Taken 5/28/2025 2156 by Analy Bob RN  Safety Promotion/Fall Prevention:   assistive device/personal items within reach   clutter free environment maintained   nonskid shoes/slippers when out of bed   room organization consistent   safety round/check completed  Taken 5/28/2025 2001 by Analy Bob RN  Safety Promotion/Fall Prevention:   assistive device/personal items within reach   clutter free environment maintained   nonskid shoes/slippers when out of bed   room organization consistent   safety round/check completed   Goal Outcome  Evaluation:

## 2025-05-29 NOTE — CASE MANAGEMENT/SOCIAL WORK
Continued Stay Note  [facility]     Patient Name: Amanda Brown  MRN: [id number]  Today's Date: 5/29/2025    Admit Date: 5/25/2025    Plan: Anticipate routine home with daughter. New rollator through [employer] (delivered). Eliquis $47/month.   Discharge Plan       Row Name 05/29/25 1217       Plan    Plan Anticipate routine home with daughter. New rollator through [employer] (delivered). Eliquis $47/month.    Plan Comments CM contacted Dorene with [employer], rollator order and delivered to bedside. CM updated RN. D/C orders noted.    Final Discharge Disposition Code 01 - home or self-care    Final Note Home with daughter                      Expected Discharge Date and Time       Expected Discharge Date Expected Discharge Time    May 29, 2025           Case Management Discharge Note      Final Note: Home with daughter         Selected Continued Care - Admitted Since 5/25/2025         Durable Medical Equipment Coordination complete.      Service Provider Services Address Phone Fax Patient Preferred    [employer] [facility] [facility] [employer] [address] [place] [id number] [phone] --             Transportation Services  Private: Car    Final Discharge Disposition Code: 01 - home or self-care    Phone communication or documentation only - no physical contact with patient or family.   JEROMY AlvaradoN, RN, CCM    [address] [address]    Office: [phone]  Fax: [phone]

## 2025-05-29 NOTE — PROGRESS NOTES
Cardiology [place]        LOS:  LOS: 3 days   Patient Name: Amanda Brown  Age/Sex: 89 y.o. female  : 6/10/1933  MRN: [id number]    Day of Service: 25   Length of Stay: 3  Encounter Provider: ADEEL Mitchell  Place of Service: [facility] CARDIOLOGY  Patient Care Team:  Ranulfo Mims MD as PCP - General (Internal Medicine)  Deep Aceves MD as Consulting Physician (Cardiology)    Subjective:     Chief Complaint: f/u Afib, elevated troponin    Subjective:   Sitting up on couch  HR 90s  Denies pain or dyspnea      Current Medications:   Scheduled Meds:amiodarone, 150 mg, Intravenous, Once   Followed by  amiodarone, 200 mg, Oral, Q8H   Followed by  [START ON 6/3/2025] amiodarone, 200 mg, Oral, Q12H   Followed by  [START ON 2025] amiodarone, 200 mg, Oral, Daily  apixaban, 2.5 mg, Oral, Q12H  aspirin, 81 mg, Oral, Daily  atorvastatin, 40 mg, Oral, Daily  metoprolol succinate XL, 75 mg, Oral, BID  timolol, 1 drop, Both Eyes, Daily      Continuous Infusions:       Allergies:  No Known Allergies    Review of Systems   Constitutional: Negative for chills and fever.   HENT:  Negative for ear discharge and nosebleeds.    Eyes:  Negative for discharge and redness.   Cardiovascular:  Negative for chest pain, orthopnea, palpitations, paroxysmal nocturnal dyspnea and syncope.   Respiratory:  Positive for shortness of breath. Negative for cough and wheezing.    Endocrine: Negative for heat intolerance.   Skin:  Negative for rash.   Musculoskeletal:  Negative for arthritis and myalgias.   Gastrointestinal:  Negative for abdominal pain, melena, nausea and vomiting.   Genitourinary:  Negative for dysuria and hematuria.   Neurological:  Negative for dizziness, light-headedness, numbness and tremors.   Psychiatric/Behavioral:  Negative for depression. The patient is not nervous/anxious.          Objective:     Temp:  [97.8 °F (36.6 °C)-98.8 °F (37.1 °C)] 98.8 °F (37.1  °C)  Heart Rate:  [] 111  Resp:  [18] 18  BP: (132-150)/(74-85) 150/82     Intake/Output Summary (Last 24 hours) at 5/29/2025 1257  Last data filed at 5/29/2025 0752  Gross per 24 hour   Intake 600 ml   Output --   Net 600 ml       Body mass index is 26.66 kg/m².      05/26/25  1456 05/27/25  0354 05/28/25  0429   Weight: 63 kg (138 lb 14.2 oz) 63.6 kg (140 lb 3.4 oz) 64 kg (141 lb 1.5 oz)         General Appearance:    Alert, cooperative, in no acute distress                                Head: Atraumatic, normocephalic, PERRLA               Neck:   supple,  no JVD   Lungs:     Clear to auscultation, respirations regular, even and               unlabored    Heart:    irregular rhythm and normal rate, normal S1 and S2   Abdomen:     Normal bowel sounds, no masses, soft  nontender, nondistended, no guarding, no rebound  tenderness   Extremities:   Moves all extremities well, 1+ BLE edema, no cyanosis, no  redness   Pulses:   Pulses palpable and equal bilaterally   Skin:   No bleeding, bruising or rash   Neurologic:   Awake, oriented x3, [place]         Lab Review:   Results from last 7 days   Lab Units 05/29/25  0320 05/28/25  0435 05/27/25  0502 05/26/25  0543   SODIUM mmol/L 143 138   < > 141   POTASSIUM mmol/L 4.0 4.1   < > 4.2   CHLORIDE mmol/L 105 103   < > 104   CO2 mmol/L 25.8 26.0   < > 26.1   BUN mg/dL 16.1 14.3   < > 16   CREATININE mg/dL 1.08* 0.96   < > 1.17*   GLUCOSE mg/dL 121* 124*   < > 110*   CALCIUM mg/dL 8.8 8.6   < > 8.9   AST (SGOT) U/L  --   --   --  86*   ALT (SGPT) U/L  --   --   --  40*    < > = values in this interval not displayed.     Results from last 7 days   Lab Units 05/26/25  0543 05/25/25  1454 05/25/25  1104 05/25/25  0947   HSTROP T ng/L 607* 394* 122* 76*     Results from last 7 days   Lab Units 05/27/25  0502 05/26/25  0543   WBC 10*3/mm3 8.31 9.08   HEMOGLOBIN g/dL 9.3* 10.1*   HEMATOCRIT % 29.1* 31.5*   PLATELETS 10*3/mm3 135* 153     Results from last 7 days   Lab Units  05/25/25  0947   INR  1.21*     Results from last 7 days   Lab Units 05/27/25  0502 05/26/25  0543   MAGNESIUM mg/dL 2.1 2.2     Results from last 7 days   Lab Units 05/25/25  0947   CHOLESTEROL mg/dL 139   TRIGLYCERIDES mg/dL 61   HDL CHOL mg/dL 45     Results from last 7 days   Lab Units 05/25/25  0947   PROBNP pg/mL 3,092.0*     Results from last 7 days   Lab Units 05/25/25  0947   TSH uIU/mL 4.390*       Recent Radiology:  Imaging Results (Most Recent)       Procedure Component Value Units Date/Time    XR Outside Chest [[id number]] Resulted: 05/27/25 1003     Updated: 05/27/25 1003    Narrative:      This procedure was auto-finalized with no dictation required.    CT Angiogram Chest Pulmonary Embolism [[id number]] Collected: 05/25/25 1602     Updated: 05/25/25 1607    Narrative:      CT ANGIOGRAM CHEST PULMONARY EMBOLISM    Date of Exam: 5/25/2025 3:40 PM EDT    Indication: elevated ddimer at OSH.    Comparison: Chest radiograph 1/7/2024.    Technique: Axial CT images were obtained of the chest after the uneventful intravenous administration of iodinated contrast utilizing pulmonary embolism protocol.  In addition, a 3-D volume rendered image was created for interpretation.  Sagittal and   coronal reconstructions were performed.  Automated exposure control and iterative reconstruction methods were used.      Findings:    Thyroid and thoracic inlet: No significant abnormality.    Lymph nodes: No pathologic appearing lymph nodes by imaging criteria.    Cardiovascular: Cardiomegaly. No pericardial effusion. Aorta and main pulmonary artery diameters are within normal range.. Coronary artery calcifications. Status post CABG. Aortic atherosclerosis.. No evidence of pulmonary embolism.    Esophagus: Small hiatal hernia.    Lung parenchyma: Scattered atelectasis including in the lower lobes and lingula. Mild interlobular septal thickening and bilateral hazy opacification.    Airways: Patent trachea and mainstem  bronchi.    Pleura: Small bilateral pleural effusions. No pneumothorax.    Chest wall and osseous structures: Degenerative changes of the imaged spine. No acute osseous abnormality.    Included abdomen: Cholelithiasis. Right adrenal hypoattenuating lesion measuring 14 mm, likely an adenoma.      Impression:      Impression:  No evidence of pulmonary embolism.    Cardiomegaly and mild pulmonary edema with small bilateral pleural effusions.      Electronically Signed: Arnaldo Napier MD    5/25/2025 4:05 PM EDT    Workstation ID: [id number]            ECHOCARDIOGRAM:    Results for orders placed during the hospital encounter of 05/25/25    Adult Transthoracic Echo Complete w/ Color, Spectral and Contrast if necessary per protocol    Interpretation Summary    Left ventricular ejection fraction appears to be 56 - 60%.    Left ventricular diastolic function is consistent with (grade III w/high LAP) reversible restrictive pattern.    The left atrial cavity is moderately dilated.    Moderate to severe mitral valve regurgitation is present.    Moderate tricuspid valve regurgitation is present.    Estimated right ventricular systolic pressure from tricuspid regurgitation is moderately elevated (45-55 mmHg).        I reviewed the patient's new clinical results.    EKG:      Assessment:       Paroxysmal atrial fibrillation with rapid ventricular response    Coronary artery disease involving native coronary artery of native heart without angina pectoris    Mixed hyperlipidemia    Benign essential HTN    Paroxysmal SVT (supraventricular tachycardia)    A-fib    Elevated troponin    A-fib RVR  [id number]-SADy score is 5  On Eliquis  Toprol-XL for rate control  On amiodarone     Non-ST elevation MI  History of coronary artery disease with CABG in 2009, unknown anatomy  High-sensitivity troponin up to 600  S/p LHC Severe three-vessel native obstructive coronary disease  Patent LIMA to LAD, vein graft to LCx and vein graft to  RCA  Elevated LVEDP  Continue aspirin, high intensity statin and beta-blocker.     HFpEF  Mitral regurgitation  proBNP 3000  HFpEF secondary to atrial fibrillation, coronary artery disease, mitral regurgitation  Echocardiogram shows preserved LV function, grade 3 diastolic dysfunction and severe mitral regurgitation with pulmonary hypertension  Diuretics based on cardiac catheterization and volume assessment  Monitor I's and O's and replace electrolytes as needed  Consider adding Jardiance  MR is also worsening atrial fibrillation  Recommend elective EVERARDO to further evaluate mitral regurgitation when she recovers from this episode.     Hypertension  Blood pressures currently normal  Continue beta-blocker     Diabetes  A1c 6.24  Insulin sliding scale during inpatient stay     Hyperlipidemia  Goal LDL less than 55  Continue high intensity statin     AUGUSTO  Renal function is improving  Closely monitor renal function while on diuretics             Plan:   Patient remains in Afib, rates variable-- oral amio started, increase beta blockers  Continue diuresis  Outpt EVERARDO to assess mitral valve outpt +/- cardioversion  [facility] showed patent grafts- troponin 394-600s- no chest pain  Low-dose Eliquis has been initiated with no reports of bleeding    Anticipated d/c home today  OP f/u with Dr. Aceves in 4 weeks      Olga Lidia Russell, ADEEL  05/29/25  12:57 EDT

## 2025-05-29 NOTE — DISCHARGE PLACEMENT REQUEST
"Yg Brown (89 y.o. Female)       Date of Birth   06/10/1933    Social Security Number       Address   [address] [place] [facility] [place] [facility]    Home Phone       MRN   [id number]       [employer]   [employer]    Marital Status                               Admission Date   5/25/2025    Admission Type   Urgent    Admitting Provider   Princess Kim MD    Attending Provider   Deb Montes DO    Department, Room/Bed   [facility], 2121/1       Discharge Date       Discharge Disposition   Home or Self Care    Discharge Destination                                 Attending Provider: Deb Montes DO    Allergies: No Known Allergies    Isolation: None   Infection: None   Code Status: CPR    Ht: 154.9 cm (61\")   Wt: 64 kg (141 lb 1.5 oz)    Admission Cmt: None   Principal Problem: Paroxysmal atrial fibrillation with rapid ventricular response [I48.0]                   Active Insurance as of 5/25/2025       Primary Coverage       Payor Plan Insurance Group Employer/Plan Group    MEDICARE MEDICARE A & B        Payor Plan Address Payor Plan Phone Number Payor Plan Fax Number Effective Dates     [id number] [phone]  6/1/1998 - None Entered    [facility] [id number]         Subscriber Name Subscriber Birth Date Member ID       YG BROWN 6/10/1933 [id number]               Secondary Coverage       Payor Plan Insurance Group Employer/Plan Group    AFLAC MC SUP AFLAC MC SUP  NGN       Payor Plan Address Payor Plan Phone Number Payor Plan Fax Number Effective Dates    [address] [facility] [phone]  11/24/2021 - None Entered    [facility] [id number]         Subscriber Name Subscriber Birth Date Member ID       YG BROWN 6/10/1933 [id number]                     Emergency Contacts        (Rel.) Home Phone Work Phone Mobile Phone    ZEINAB BROWN (Daughter) [phone] -- [phone]                "

## 2025-05-29 NOTE — PROGRESS NOTES
"                                                                                                                                      Nephrology  Progress Note                                        Patient Identification    Name: Amanda Brown  Age: 89 y.o.  Sex: female  :  6/10/1933  MRN: [id number]      DATE OF SERVICE:  2025        Seen for AUGUSTO/CKD    Subective    Events noted, patient seen on RA, feels ok, no sob or chest pain.  Family in the room     Objective   Scheduled Meds:amiodarone, 150 mg, Intravenous, Once   Followed by  amiodarone, 200 mg, Oral, Q8H   Followed by  [START ON 6/3/2025] amiodarone, 200 mg, Oral, Q12H   Followed by  [START ON 2025] amiodarone, 200 mg, Oral, Daily  apixaban, 2.5 mg, Oral, Q12H  aspirin, 81 mg, Oral, Daily  atorvastatin, 40 mg, Oral, Daily  metoprolol succinate XL, 75 mg, Oral, BID  timolol, 1 drop, Both Eyes, Daily          Continuous Infusions:       PRN Meds:  acetaminophen    atropine    senna-docusate sodium **AND** polyethylene glycol **AND** bisacodyl **AND** bisacodyl    Calcium Replacement - Follow Nurse / BPA Driven Protocol    diphenhydrAMINE    Magnesium Cardiology Dose Replacement - Follow Nurse / BPA Driven Protocol    nitroglycerin    nitroglycerin    ondansetron ODT **OR** ondansetron    Phosphorus Replacement - Follow Nurse / BPA Driven Protocol    Potassium Replacement - Follow Nurse / BPA Driven Protocol     Exam:  /82 (BP Location: Left arm, Patient Position: Sitting)   Pulse 111   Temp 98.8 °F (37.1 °C) (Oral)   Resp 18   Ht 154.9 cm (61\")   Wt 64 kg (141 lb 1.5 oz)   SpO2 97%   BMI 26.66 kg/m²     Intake/Output last 3 shifts:  I/O last 3 completed shifts:  In: 480 [P.O.:480]  Out: -     Intake/Output this shift:  I/O this shift:  In: 120 [P.O.:120]  Out: -     Physical exam:  General Appearance:  Alert  Head:  Normocephalic, without obvious abnormality, atraumatic  Eyes:  PERRL, conjunctiva/corneas clear     Neck:  Supple,  " no adenopathy;      Lungs:  Decreased BS occasion ronchi  Heart:  Regular rate and rhythm, S1 and S2 normal  Abdomen:  Soft, non-tender, bowel sounds active   Extremities: trace edema  Pulses: 2+ and symmetric all extremities  Skin:  No rashes or lesions       Data Review:  All labs (24hrs):   Recent Results (from the past 24 hours)   Basic Metabolic Panel    Collection Time: 05/29/25  3:20 AM    Specimen: Arm, Left; Blood   Result Value Ref Range    Glucose 121 (H) 65 - 99 mg/dL    BUN 16.1 8.0 - 23.0 mg/dL    Creatinine 1.08 (H) 0.57 - 1.00 mg/dL    Sodium 143 136 - 145 mmol/L    Potassium 4.0 3.5 - 5.2 mmol/L    Chloride 105 98 - 107 mmol/L    CO2 25.8 22.0 - 29.0 mmol/L    Calcium 8.8 8.2 - 9.6 mg/dL    BUN/Creatinine Ratio 14.9 7.0 - 25.0    Anion Gap 12.2 5.0 - 15.0 mmol/L    eGFR 48.6 (L) >60.0 mL/min/1.73   ECG 12 Lead Drug Monitoring; Amiodarone    Collection Time: 05/29/25  5:39 AM   Result Value Ref Range    QT Interval 395 ms    QTC Interval 526 ms          Imaging:  CT Angiogram Chest Pulmonary Embolism  Result Date: 5/25/2025  Impression: No evidence of pulmonary embolism. Cardiomegaly and mild pulmonary edema with small bilateral pleural effusions. Electronically Signed: Arnaldo Napier MD  5/25/2025 4:05 PM EDT  Workstation ID: [id number]      Assessment/Plan:     Paroxysmal atrial fibrillation with rapid ventricular response    Coronary artery disease involving native coronary artery of native heart without angina pectoris    Mixed hyperlipidemia    Benign essential HTN    Paroxysmal SVT (supraventricular tachycardia)    A-fib    Elevated troponin         AUGUSTO/CKD  Afib  HTN  SVT  CAD s/p CABG  HLP  Edema     Recommendations:  Labs noted, Cr stable 1, lytes ok  CTA was negative   Avoid nephrotoxins   No need for IV fluid   Status post Mucomyst treatment   Ok for PRN diuresis  Monitor labs periodically      Additional Anesthesia Volume In Cc (Will Not Render If 0): 0 Anesthesia Volume In Cc (Will Not Render If 0): 0.3 Curettage Text: The wound bed was treated with curettage after the biopsy was performed. Silver Nitrate Text: The wound bed was treated with silver nitrate after the biopsy was performed. Lab: -209 Anesthesia Type: 1% lidocaine without epinephrine Depth Of Biopsy: dermis Wound Care: Bacitracin Cryotherapy Text: The wound bed was treated with cryotherapy after the biopsy was performed. Billing Type: Third-Party Bill Biopsy Method: Dermablade Was A Bandage Applied: Yes Bill [id number] For Specimen Handling/Conveyance To Laboratory?: no Detail Level: Detailed Electrodesiccation Text: The wound bed was treated with electrodesiccation after the biopsy was performed. Notification Instructions: Patient will be notified of biopsy results. However, patient instructed to call the office if not contacted within 2 weeks. Electrodesiccation And Curettage Text: The wound bed was treated with electrodesiccation and curettage after the biopsy was performed. Lab Facility: [id number] Type Of Destruction Used: Curettage Post-Care Instructions: I reviewed with the patient in detail post-care instructions. Patient is to keep the biopsy site dry overnight, and then apply bacitracin twice daily until healed. Patient may apply hydrogen peroxide soaks to remove any crusting. Biopsy Type: H and E Consent: Verbal consent was obtained and risks were reviewed including but not limited to scarring, infection, bleeding, scabbing, incomplete removal, nerve damage and allergy to anesthesia. Dressing: bandage Hemostasis: Drysol

## 2025-05-29 NOTE — DISCHARGE SUMMARY
Hospitalist Service   DISCHARGE SUMMARY    Patient Name: Amanda Brown  : 6/10/1933  MRN: [id number]    Date of Admission: 2025  Date of Discharge:  25    Primary Care Physician: Ranulfo Mims MD      Presenting Problem:   A-fib [I48.91]    Active and Resolved Hospital Problems:  Active Hospital Problems    Diagnosis POA    **Paroxysmal atrial fibrillation with rapid ventricular response [I48.0] Yes    A-fib [I48.91] Yes    Elevated troponin [R79.89] Unknown    Paroxysmal SVT (supraventricular tachycardia) [I47.10] Yes    Benign essential HTN [I10] Yes    Coronary artery disease involving native coronary artery of native heart without angina pectoris [I25.10] Yes    Mixed hyperlipidemia [E78.2] Yes      Resolved Hospital Problems   No resolved problems to display.         Hospital Course     Hospital Course:  Amanda Brown is a 89 y.o. female with a CMH of HTN, HLD, hx SVT who presented to [facility] on 2025 with  new onset a fib RVR. Patient originally presented to [facility] ER due to complaints of chest pressure, lightheadedness and nausea as well as “feeling heart racing”.  Further details as noted below.  Patient is clinically improved and stable to discharge home today with follow-up with PCP and cardiology as an outpatient.      A/P:    New onset Atrial Fibrillation with RVR  Hx SVT  -Remains on amiodarone currently, wean off as tolerated  -[id number] score of at least 5  -Started on Eliquis per cardiology  -Cardiology following, okay to discharge on current med regimen with outpatient follow-up     Type II MI  CAD s/p CABG  -s/p C on  showing patent t CABG hree-vessel  -Continue medical therapy     Mitral regurgitation  Chronic HFpEF  -Cardiology recommending EVERARDO for further evaluation, likely done as outpatient  -Continue GDMT as tolerated     Abnormal UA  -urine culture grew mixed vini suggestive of contamination.  Finish 3 days of IV  ceftriaxone, cannot rule out UTI     AUGUSTO  -Renal function is improving.  Continue to monitor.  -Nephrology following  DISCHARGE Follow Up Recommendations for labs and diagnostics:   Follow-up with PCP and cardiology    Day of Discharge     Vital Signs:  Temp:  [97.8 °F (36.6 °C)-98.1 °F (36.7 °C)] 98 °F (36.7 °C)  Heart Rate:  [] 105  Resp:  [18-23] 18  BP: (122-150)/(74-85) 140/83    Physical Exam:  General: Awake, alert, NAD  Eyes: PERRL, EOMI, conjunctivae are clear  Cardiovascular: Regular rate and rhythm, no murmurs  Respiratory: Clear to auscultation bilaterally, no wheezing or rales, unlabored breathing  Abdomen: Soft, nontender, positive bowel sounds, no guarding  Neurologic: A&O, CN grossly intact, moves all extremities spontaneously  Musculoskeletal: Normal range of motion, no other gross deformities  Skin: Warm, dry        Pertinent  and/or Most Recent Results     LAB RESULTS:      Lab 05/27/25  0502 05/26/25  0543 05/25/25  0947   WBC 8.31 9.08 8.57   HEMOGLOBIN 9.3* 10.1* 10.6*   HEMATOCRIT 29.1* 31.5* 33.2*   PLATELETS 135* 153 157   NEUTROS ABS 5.84 6.25 6.57   IMMATURE GRANS (ABS) 0.02 0.03 0.03   LYMPHS ABS 1.55 1.89 1.55   MONOS ABS 0.67 0.67 0.35   EOS ABS 0.20 0.21 0.05   MCV 95.1 94.6 95.1   PROTIME  --   --  15.2*   D DIMER QUANT  --   --  14.68*         Lab 05/29/25  0320 05/28/25  0435 05/27/25  1156 05/27/25  0502 05/26/25  0543 05/25/25  0947   SODIUM 143 138  --  138 141 139   POTASSIUM 4.0 4.1 4.3 3.8 4.2 4.6   CHLORIDE 105 103  --  104 104 104   CO2 25.8 26.0  --  26.0 26.1 24.8   ANION GAP 12.2 9.0  --  8.0 10.9 10.2   BUN 16.1 14.3  --  13 16 21   CREATININE 1.08* 0.96  --  0.94 1.17* 1.29*   EGFR 48.6* 56.0*  --  57.4* 44.1* 39.3*   GLUCOSE 121* 124*  --  123* 110* 128*   CALCIUM 8.8 8.6  --  8.3 8.9 9.0   MAGNESIUM  --   --   --  2.1 2.2 1.8   PHOSPHORUS  --   --   --  2.7 2.6 3.6   HEMOGLOBIN A1C  --   --   --   --   --  6.24*   TSH  --   --   --   --   --  4.390*         Lab  05/26/25  0543   TOTAL PROTEIN 6.2   ALBUMIN 3.8   GLOBULIN 2.4   ALT (SGPT) 40*   AST (SGOT) 86*   BILIRUBIN 0.5   ALK PHOS 89         Lab 05/26/25  0543 05/25/25  1454 05/25/25  1104 05/25/25  0947   PROBNP  --   --   --  3,092.0*   HSTROP T 607* 394* 122* 76*   PROTIME  --   --   --  15.2*   INR  --   --   --  1.21*         Lab 05/25/25  0947   CHOLESTEROL 139   LDL CHOL 81   HDL CHOL 45   TRIGLYCERIDES 61             Brief Urine Lab Results  (Last result in the past 365 days)        Color   Clarity   Blood   Leuk Est   Nitrite   Protein   CREAT   Urine HCG        05/25/25 1122 Yellow   Clear   Small (1+)   Small (1+)   Negative   Trace                 Microbiology Results (last 10 days)       Procedure Component Value - Date/Time    Urine Culture - Urine, Urine, Clean Catch [[id number]] Collected: 05/25/25 1122    Lab Status: Final result Specimen: Urine, Clean Catch Updated: 05/26/25 1207     Urine Culture 50,000 CFU/mL Mixed Angelia Isolated    Narrative:      Specimen contains mixed organisms of questionable pathogenicity suggestive of contamination. If symptoms persist, suggest recollection.  Colonization of the urinary tract without infection is common. Treatment is discouraged unless the patient is symptomatic, pregnant, or undergoing an invasive urologic procedure.    Respiratory Panel PCR w/COVID-19(SARS-CoV-2) SUSAN/JOSUÉ/SELVIN/PAD/COR/DIANE In-House, NP Swab in UTM/VTM, 2 HR TAT - Swab, Nasopharynx [[id number]]  (Normal) Collected: 05/25/25 0947    Lab Status: Final result Specimen: Swab from Nasopharynx Updated: 05/25/25 1051     ADENOVIRUS, PCR Not Detected     Coronavirus 229E Not Detected     Coronavirus HKU1 Not Detected     Coronavirus NL63 Not Detected     Coronavirus OC43 Not Detected     COVID19 Not Detected     Human Metapneumovirus Not Detected     Human Rhinovirus/Enterovirus Not Detected     Influenza A PCR Not Detected     Influenza B PCR Not Detected     Parainfluenza Virus 1 Not Detected      Parainfluenza Virus 2 Not Detected     Parainfluenza Virus 3 Not Detected     Parainfluenza Virus 4 Not Detected     RSV, PCR Not Detected     Bordetella pertussis pcr Not Detected     Bordetella parapertussis PCR Not Detected     Chlamydophila pneumoniae PCR Not Detected     Mycoplasma pneumo by PCR Not Detected    Narrative:      In the setting of a positive respiratory panel with a viral infection PLUS a negative procalcitonin without other underlying concern for bacterial infection, consider observing off antibiotics or discontinuation of antibiotics and continue supportive care. If the respiratory panel is positive for atypical bacterial infection (Bordetella pertussis, Chlamydophila pneumoniae, or Mycoplasma pneumoniae), consider antibiotic de-escalation to target atypical bacterial infection.            CT Angiogram Chest Pulmonary Embolism  Result Date: 5/25/2025  Impression: Impression: No evidence of pulmonary embolism. Cardiomegaly and mild pulmonary edema with small bilateral pleural effusions. Electronically Signed: Arnaldo Napier MD  5/25/2025 4:05 PM EDT  Workstation ID: [id number]      Results for orders placed during the hospital encounter of 05/25/25    Duplex Venous Lower Extremity - Bilateral CAR    Interpretation Summary    Normal bilateral lower extremity venous duplex scan.      Results for orders placed during the hospital encounter of 05/25/25    Duplex Venous Lower Extremity - Bilateral CAR    Interpretation Summary    Normal bilateral lower extremity venous duplex scan.      Results for orders placed during the hospital encounter of 05/25/25    Adult Transthoracic Echo Complete w/ Color, Spectral and Contrast if necessary per protocol    Interpretation Summary    Left ventricular ejection fraction appears to be 56 - 60%.    Left ventricular diastolic function is consistent with (grade III w/high LAP) reversible restrictive pattern.    The left atrial cavity is moderately dilated.     Moderate to severe mitral valve regurgitation is present.    Moderate tricuspid valve regurgitation is present.    Estimated right ventricular systolic pressure from tricuspid regurgitation is moderately elevated (45-55 mmHg).      Labs Pending at Discharge:      Procedures Performed  Procedure(s):  Left Heart Cath         Consults:   Consults       Date and Time Order Name Status Description    5/25/2025  1:13 PM Inpatient Nephrology Consult      5/25/2025  8:24 AM Inpatient Cardiology Consult Completed               Discharge Details        Discharge Medications        New Medications        Instructions Start Date   amiodarone 200 MG tablet  Commonly known as: PACERONE   Take 1 tablet every 8 hours until 6/2, then take 1 tablet every 12 hours until 6/16, then take 1 tablet daily      Eliquis 2.5 MG tablet tablet  Generic drug: apixaban   2.5 mg, Oral, Every 12 Hours Scheduled             Changes to Medications        Instructions Start Date   metoprolol succinate XL 25 MG 24 hr tablet  Commonly known as: TOPROL-XL  What changed: how much to take   75 mg, Oral, 2 Times Daily             Continue These Medications        Instructions Start Date   aspirin 81 MG EC tablet   81 mg, Daily      atorvastatin 40 MG tablet  Commonly known as: LIPITOR   40 mg, Daily      Lumigan 0.01 % ophthalmic drops  Generic drug: bimatoprost   1 drop, Nightly      meclizine 25 MG tablet  Commonly known as: ANTIVERT   25 mg, Daily      omeprazole 20 MG capsule  Commonly known as: priLOSEC   20 mg, 4 Times Weekly      timolol 0.5 % ophthalmic solution  Commonly known as: TIMOPTIC   timolol maleate 0.5 % eye drops      VITAMIN B COMPLEX PO   2 Times Weekly             Stop These Medications      hydroCHLOROthiazide 25 MG tablet     lisinopril 20 MG tablet  Commonly known as: PRINIVIL,ZESTRIL              No Known Allergies      Discharge Disposition:  Home or Self Care    Diet:  Hospital:  Diet Order   Procedures    Diet: Cardiac; No Salt  Packet; Fluid Consistency: Thin (IDDSI 0)         Discharge Activity:         CODE STATUS:  Code Status and Medical Interventions: CPR (Attempt to Resuscitate); Full Support   Ordered at: 05/25/25 1325     Code Status (Patient has no pulse and is not breathing):    CPR (Attempt to Resuscitate)     Medical Interventions (Patient has pulse or is breathing):    Full Support         No future appointments.        Time spent on Discharge including face to face service:  35 minutes    Signature:    Electronically signed by Deb Montes DO, 05/29/25, 10:01 AM EDT.      Part of this note may be an electronic transcription/translation of spoken language to printed text using the Dragon Dictation System.

## 2025-05-30 ENCOUNTER — READMISSION MANAGEMENT (OUTPATIENT)
Dept: CALL CENTER | Facility: HOSPITAL | Age: OVER 89
End: 2025-05-30
Payer: MEDICARE

## 2025-05-30 NOTE — OUTREACH NOTE
Prep Survey      Flowsheet Row Responses   [facility] facility patient discharged from? [facility]   Is LACE score < 7 ? No   Eligibility Readm Mgmt   Discharge diagnosis Paroxysmal atrial fibrillation with rapid ventricular response   Does the patient have one of the following disease processes/diagnoses(primary or secondary)? Other   Does the patient have Home health ordered? No   Is there a DME ordered? Yes   What DME was ordered? [employer]   Medication alerts for this patient see avs   Prep survey completed? Yes            Yen TREVIÑO - Registered Nurse

## 2025-06-02 ENCOUNTER — TELEPHONE (OUTPATIENT)
Dept: CARDIOLOGY | Facility: CLINIC | Age: OVER 89
End: 2025-06-02
Payer: MEDICARE

## 2025-06-02 NOTE — TELEPHONE ENCOUNTER
Caller: ZEINAB BARRY    Relationship: Emergency Contact    Best call back number:       PATIENT IS NEEDING A 2 WEEK HOSPITAL FOLLOW UP, THRE IS NO AVAILABILITY

## 2025-06-03 ENCOUNTER — READMISSION MANAGEMENT (OUTPATIENT)
Dept: CALL CENTER | Facility: HOSPITAL | Age: OVER 89
End: 2025-06-03
Payer: MEDICARE

## 2025-06-03 NOTE — OUTREACH NOTE
Medical Week 1 Survey      Flowsheet Row Responses   [facility] patient discharged from? [facility]   Does the patient have one of the following disease processes/diagnoses(primary or secondary)? Other   Week 1 attempt successful? Yes   Call start time 0957   Call end time 0959   Discharge diagnosis Paroxysmal atrial fibrillation with rapid ventricular response   Person spoke with today (if not patient) and relationship Daughter- Lizabeth   Meds reviewed with patient/caregiver? Yes   Does the patient have all medications ordered at discharge? Yes   Prescription comments START taking:  amiodarone (PACERONE)  Eliquis (apixaban)  CHANGE how you take:  metoprolol succinate XL (TOPROL-XL) -- how much to  take  STOP taking:  hydroCHLOROthiazide 25 MG tablet  lisinopril 20 MG tablet (PRINIVIL,ZESTRIL)   Is the patient taking all medications as directed (includes completed medication regime)? Yes   Does the patient have a primary care provider?  Yes   Comments regarding PCP Has an appt with pcp   Comments 06/30 soonest with cards.   Has home health visited the patient within 72 hours of discharge? N/A   Psychosocial issues? No   Did the patient receive a copy of their discharge instructions? Yes   Nursing interventions Reviewed instructions with patient   What is the patient's perception of their health status since discharge? Improving   Is the patient/caregiver able to teach back signs and symptoms related to disease process for when to call PCP? Yes   Is the patient/caregiver able to teach back signs and symptoms related to disease process for when to call 911? Yes   Is the patient/caregiver able to teach back the hierarchy of who to call/visit for symptoms/problems? PCP, Specialist, Home health nurse, Urgent Care, ED, 911 Yes   Week 1 call completed? Yes   Graduated Yes   Wrap up additional comments Daughter reports patient is doing well no concerns or questions noted.   Call end time 0959            Charity Duran  Nurse

## 2025-06-04 ENCOUNTER — TELEPHONE (OUTPATIENT)
Dept: CARDIOLOGY | Facility: CLINIC | Age: OVER 89
End: 2025-06-04
Payer: MEDICARE

## 2025-06-04 NOTE — TELEPHONE ENCOUNTER
Per Olga Lidia DENIS, Amanda should monitor her blood pressure and heart rates.  I spoke with Lizabeth, daughter, letting her know that we need her to monitor the blood pressure and heart rates. Lizabeth states that she has been doing that the past couple of days. (They were in the car and did not remember what blood pressure/heart rates were running)  I asked her to continue to monitor for the next couple of days along with her swelling and weight gain and call us Friday morning with the information.  I also advised her to take her mom to the ER if her symptoms get worse.  She verbalized her understanding.  TUCKER Noe

## 2025-06-04 NOTE — TELEPHONE ENCOUNTER
Lizabeth called stating her mom has shortness of breath when she walks any distance and wanted to know if this was normal.  Amanda was recently discharged from [facility] for atrial fibrillation. She is currently taking Amiodarone 200mg twice daily, Eliquis 2.5mg twice daily and Metoprolol Succinate 75mg twice daily.  I advised Lizabeth that being short of breath is normal for patients with atrial fibrillation.  Lizabeth also states that Amanda is having swelling in her ankles.  I advised her to keep Amanda's feet up when sitting, watch salt intake (processed foods) and water intake.  Lizabeth did not know if Amanda is having weight gain, but will monitor her weight for the next couple of days and call with any weight gain or if the swelling does not get better.  Amanda is not currently taking a diuretic.  Amanda is scheduled to see Dr. Aceves in [place] at the end of June.  TUCKER Noe

## 2025-06-06 ENCOUNTER — TELEPHONE (OUTPATIENT)
Dept: CARDIOLOGY | Facility: CLINIC | Age: OVER 89
End: 2025-06-06

## 2025-06-06 NOTE — TELEPHONE ENCOUNTER
Caller: ZEINAB BARRY    Relationship: Emergency Contact    Best call back number: [phone]    What is the best time to reach you: ANY    Who are you requesting to speak with (clinical staff, provider,  specific staff member): CLINICAL        What was the call regarding: PATIENT'S DAUGHTER ZEINAB CALLED TO GIVE BLOOD PRESSURE REPORT. ZEINAB STATED THAT PATIENT TAKES ELIQUIS AND METOPROLOL SUCCINATE MEDICATIONS AT 7 AM AND 7 PM.PATIENT TAKES AMIODARONE AT 9 AM AND 9 PM. ZEINAB STATED THAT SHE THINKS THEIR BLOOD PRESSURE MACHINE HAS A 16 POINT DIFFERENCE AND RUNS HIGHER. ZEINAB STATED THAT THE PATIENT DID WEAR COMPRESSION SOCKS AND ANKLES DO LOOK BETTER.     06.02.25 1:30 /105 PULSE 93                  9:40 PM  154/83 PULSE 84  06.03.25 8:50 /77 PULSE 97                  9:35 /87 PULSE 103 WEIGHT   06.04.25 8:25 /93 PULSE 113                   3:20 /67 PULSE 105 @ 8:30 PM PATIENT FELL AND HIT HER HEAD. PATIENT WENT TO [facility]. CT SCAN CAME BACK NO BRAIN BLEED.   06.05.25 9 /100 PULSE 109 WEIGHT .6                 11:35 /84 PULSE 86                  9:20 /90 PULSE 106  06.06.25 9:40 /82 PULSE 101 WEIGHT                  11:10 /82 PULSE 108          Is it okay if the provider responds through MyChart: CALL

## 2025-06-12 ENCOUNTER — TELEPHONE (OUTPATIENT)
Dept: CARDIOLOGY | Facility: CLINIC | Age: OVER 89
End: 2025-06-12
Payer: MEDICARE

## 2025-06-12 DIAGNOSIS — R53.83 FATIGUE, UNSPECIFIED TYPE: ICD-10-CM

## 2025-06-12 DIAGNOSIS — I10 BENIGN ESSENTIAL HTN: Chronic | ICD-10-CM

## 2025-06-12 DIAGNOSIS — I47.10 PAROXYSMAL SVT (SUPRAVENTRICULAR TACHYCARDIA): Chronic | ICD-10-CM

## 2025-06-12 DIAGNOSIS — R60.0 BILATERAL LEG EDEMA: Primary | ICD-10-CM

## 2025-06-12 DIAGNOSIS — I48.0 PAROXYSMAL ATRIAL FIBRILLATION WITH RAPID VENTRICULAR RESPONSE: ICD-10-CM

## 2025-06-12 DIAGNOSIS — I25.10 CORONARY ARTERY DISEASE INVOLVING NATIVE CORONARY ARTERY OF NATIVE HEART WITHOUT ANGINA PECTORIS: Chronic | ICD-10-CM

## 2025-06-12 NOTE — TELEPHONE ENCOUNTER
We would like patient to have some labs done.  ProBNP, CMP, Magnesium and TSH.  I will place orders for [facility] lab.  She can have these done at the express lab at [place] tomorrow.     Her daughter is agreeable to take her tomorrow.

## 2025-06-12 NOTE — TELEPHONE ENCOUNTER
Patient says swelling in legs is slight, there is some slight shortness of breath.  She says the main problem she is concerned with is that she is very fatigued.  She does have a follow up with Dr. Aceves in [place] on 6/30.    Cath site is described as normal, no bruising or hard knots.      BP's between 120's/70's to 160/90's depending on time of day with Heart rates averaging 90's to low 100's    She states that metoprolol was increased by 100 mg daily in the hospital.  She is also taking amiodarone.    She is also asking if she should be taking her diuretic, she states she was on hydrochlorothiazide before Heart Cath.    Discussed that if she starts to have worsening shortness of breath she would need to go to the hospital.

## 2025-06-12 NOTE — TELEPHONE ENCOUNTER
Caller: ZEINAB BARRY    Relationship to patient: Emergency Contact    Best call back number: [phone]    Patient is needing:     EDEMA IN BOTH LEGS/ANKLES.   MAJOR FATIGUE.     WANTS TO KNOW IF THESE SYMPTOMS ARE NORMAL SINCE HEART CATH IN MAY.

## 2025-06-13 ENCOUNTER — HOSPITAL ENCOUNTER (INPATIENT)
Facility: HOSPITAL | Age: OVER 89
LOS: 1 days | Discharge: HOME | DRG: 280 | End: 2025-06-15
Attending: EMERGENCY MEDICINE | Admitting: INTERNAL MEDICINE
Payer: MEDICARE

## 2025-06-13 ENCOUNTER — APPOINTMENT (OUTPATIENT)
Dept: GENERAL RADIOLOGY | Facility: HOSPITAL | Age: OVER 89
DRG: 280 | End: 2025-06-13
Payer: MEDICARE

## 2025-06-13 DIAGNOSIS — I50.9 ACUTE CONGESTIVE HEART FAILURE, UNSPECIFIED HEART FAILURE TYPE: ICD-10-CM

## 2025-06-13 DIAGNOSIS — I48.19 PERSISTENT ATRIAL FIBRILLATION: ICD-10-CM

## 2025-06-13 DIAGNOSIS — R06.02 SHORTNESS OF BREATH: Primary | ICD-10-CM

## 2025-06-13 LAB
ALBUMIN SERPL-MCNC: 4.1 G/DL (ref 3.5–5.2)
ALBUMIN/GLOB SERPL: 1.4 G/DL
ALP SERPL-CCNC: 102 U/L (ref 39–117)
ALT SERPL W P-5'-P-CCNC: 45 U/L (ref 1–33)
ANION GAP SERPL CALCULATED.3IONS-SCNC: 11 MMOL/L (ref 5–15)
ANION GAP SERPL CALCULATED.3IONS-SCNC: 13.9 MMOL/L (ref 5–15)
AST SERPL-CCNC: 42 U/L (ref 1–32)
BASOPHILS # BLD AUTO: 0.05 10*3/MM3 (ref 0–0.2)
BASOPHILS NFR BLD AUTO: 0.5 % (ref 0–1.5)
BILIRUB SERPL-MCNC: 1.3 MG/DL (ref 0–1.2)
BUN SERPL-MCNC: 17.5 MG/DL (ref 8–23)
BUN SERPL-MCNC: 17.9 MG/DL (ref 8–23)
BUN/CREAT SERPL: 15.7 (ref 7–25)
BUN/CREAT SERPL: 18.2 (ref 7–25)
CALCIUM SPEC-SCNC: 8.2 MG/DL (ref 8.2–9.6)
CALCIUM SPEC-SCNC: 9.3 MG/DL (ref 8.2–9.6)
CHLORIDE SERPL-SCNC: 102 MMOL/L (ref 98–107)
CHLORIDE SERPL-SCNC: 103 MMOL/L (ref 98–107)
CO2 SERPL-SCNC: 25 MMOL/L (ref 22–29)
CO2 SERPL-SCNC: 25.1 MMOL/L (ref 22–29)
CREAT SERPL-MCNC: 0.96 MG/DL (ref 0.57–1)
CREAT SERPL-MCNC: 1.14 MG/DL (ref 0.57–1)
DEPRECATED RDW RBC AUTO: 48.8 FL (ref 37–54)
EGFRCR SERPLBLD CKD-EPI 2021: 45.3 ML/MIN/1.73
EGFRCR SERPLBLD CKD-EPI 2021: 55.6 ML/MIN/1.73
EOSINOPHIL # BLD AUTO: 0.19 10*3/MM3 (ref 0–0.4)
EOSINOPHIL NFR BLD AUTO: 1.8 % (ref 0.3–6.2)
ERYTHROCYTE [DISTWIDTH] IN BLOOD BY AUTOMATED COUNT: 14.2 % (ref 12.3–15.4)
GLOBULIN UR ELPH-MCNC: 2.9 GM/DL
GLUCOSE SERPL-MCNC: 106 MG/DL (ref 65–99)
GLUCOSE SERPL-MCNC: 155 MG/DL (ref 65–99)
HCT VFR BLD AUTO: 34.3 % (ref 34–46.6)
HGB BLD-MCNC: 10.9 G/DL (ref 12–15.9)
HOLD SPECIMEN: NORMAL
IMM GRANULOCYTES # BLD AUTO: 0.06 10*3/MM3 (ref 0–0.05)
IMM GRANULOCYTES NFR BLD AUTO: 0.6 % (ref 0–0.5)
LYMPHOCYTES # BLD AUTO: 1.55 10*3/MM3 (ref 0.7–3.1)
LYMPHOCYTES NFR BLD AUTO: 15 % (ref 19.6–45.3)
MAGNESIUM SERPL-MCNC: 1.8 MG/DL (ref 1.7–2.3)
MCH RBC QN AUTO: 30.9 PG (ref 26.6–33)
MCHC RBC AUTO-ENTMCNC: 31.8 G/DL (ref 31.5–35.7)
MCV RBC AUTO: 97.2 FL (ref 79–97)
MONOCYTES # BLD AUTO: 0.61 10*3/MM3 (ref 0.1–0.9)
MONOCYTES NFR BLD AUTO: 5.9 % (ref 5–12)
NEUTROPHILS NFR BLD AUTO: 7.89 10*3/MM3 (ref 1.7–7)
NEUTROPHILS NFR BLD AUTO: 76.2 % (ref 42.7–76)
NRBC BLD AUTO-RTO: 0 /100 WBC (ref 0–0.2)
NT-PROBNP SERPL-MCNC: ABNORMAL PG/ML (ref 0–1800)
PLATELET # BLD AUTO: 308 10*3/MM3 (ref 140–450)
PMV BLD AUTO: 10.6 FL (ref 6–12)
POTASSIUM SERPL-SCNC: 3.4 MMOL/L (ref 3.5–5.2)
POTASSIUM SERPL-SCNC: 5.7 MMOL/L (ref 3.5–5.2)
PROT SERPL-MCNC: 7 G/DL (ref 6–8.5)
RBC # BLD AUTO: 3.53 10*6/MM3 (ref 3.77–5.28)
SODIUM SERPL-SCNC: 139 MMOL/L (ref 136–145)
SODIUM SERPL-SCNC: 141 MMOL/L (ref 136–145)
TROPONIN T SERPL HS-MCNC: 13 NG/L
WBC NRBC COR # BLD AUTO: 10.35 10*3/MM3 (ref 3.4–10.8)
WHOLE BLOOD HOLD COAG: NORMAL

## 2025-06-13 PROCEDURE — 99285 EMERGENCY DEPT VISIT HI MDM: CPT

## 2025-06-13 PROCEDURE — 36415 COLL VENOUS BLD VENIPUNCTURE: CPT

## 2025-06-13 PROCEDURE — 93005 ELECTROCARDIOGRAM TRACING: CPT

## 2025-06-13 PROCEDURE — 85025 COMPLETE CBC W/AUTO DIFF WBC: CPT

## 2025-06-13 PROCEDURE — 84484 ASSAY OF TROPONIN QUANT: CPT | Performed by: EMERGENCY MEDICINE

## 2025-06-13 PROCEDURE — 99215 OFFICE O/P EST HI 40 MIN: CPT | Performed by: NURSE PRACTITIONER

## 2025-06-13 PROCEDURE — 80053 COMPREHEN METABOLIC PANEL: CPT | Performed by: EMERGENCY MEDICINE

## 2025-06-13 PROCEDURE — 85025 COMPLETE CBC W/AUTO DIFF WBC: CPT | Performed by: EMERGENCY MEDICINE

## 2025-06-13 PROCEDURE — 93005 ELECTROCARDIOGRAM TRACING: CPT | Performed by: EMERGENCY MEDICINE

## 2025-06-13 PROCEDURE — 83880 ASSAY OF NATRIURETIC PEPTIDE: CPT | Performed by: EMERGENCY MEDICINE

## 2025-06-13 PROCEDURE — G0378 HOSPITAL OBSERVATION PER HR: HCPCS

## 2025-06-13 PROCEDURE — 25010000002 FUROSEMIDE PER 20 MG: Performed by: EMERGENCY MEDICINE

## 2025-06-13 PROCEDURE — 71045 X-RAY EXAM CHEST 1 VIEW: CPT

## 2025-06-13 PROCEDURE — 25010000002 FUROSEMIDE PER 20 MG

## 2025-06-13 PROCEDURE — 83735 ASSAY OF MAGNESIUM: CPT

## 2025-06-13 RX ORDER — SODIUM CHLORIDE 9 MG/ML
40 INJECTION, SOLUTION INTRAVENOUS AS NEEDED
Status: DISCONTINUED | OUTPATIENT
Start: 2025-06-13 | End: 2025-06-15 | Stop reason: HOSPADM

## 2025-06-13 RX ORDER — ONDANSETRON 4 MG/1
4 TABLET, ORALLY DISINTEGRATING ORAL EVERY 6 HOURS PRN
Status: DISCONTINUED | OUTPATIENT
Start: 2025-06-13 | End: 2025-06-15 | Stop reason: HOSPADM

## 2025-06-13 RX ORDER — POLYETHYLENE GLYCOL 3350 17 G/17G
17 POWDER, FOR SOLUTION ORAL DAILY PRN
Status: DISCONTINUED | OUTPATIENT
Start: 2025-06-13 | End: 2025-06-15 | Stop reason: HOSPADM

## 2025-06-13 RX ORDER — FUROSEMIDE 10 MG/ML
80 INJECTION INTRAMUSCULAR; INTRAVENOUS ONCE
Status: COMPLETED | OUTPATIENT
Start: 2025-06-13 | End: 2025-06-13

## 2025-06-13 RX ORDER — SODIUM CHLORIDE 0.9 % (FLUSH) 0.9 %
10 SYRINGE (ML) INJECTION AS NEEDED
Status: DISCONTINUED | OUTPATIENT
Start: 2025-06-13 | End: 2025-06-15 | Stop reason: HOSPADM

## 2025-06-13 RX ORDER — BISACODYL 5 MG/1
5 TABLET, DELAYED RELEASE ORAL DAILY PRN
Status: DISCONTINUED | OUTPATIENT
Start: 2025-06-13 | End: 2025-06-15 | Stop reason: HOSPADM

## 2025-06-13 RX ORDER — FUROSEMIDE 10 MG/ML
40 INJECTION INTRAMUSCULAR; INTRAVENOUS
Status: DISCONTINUED | OUTPATIENT
Start: 2025-06-13 | End: 2025-06-15

## 2025-06-13 RX ORDER — PANTOPRAZOLE SODIUM 40 MG/1
40 TABLET, DELAYED RELEASE ORAL DAILY
Status: DISCONTINUED | OUTPATIENT
Start: 2025-06-14 | End: 2025-06-15 | Stop reason: HOSPADM

## 2025-06-13 RX ORDER — BISACODYL 10 MG
10 SUPPOSITORY, RECTAL RECTAL DAILY PRN
Status: DISCONTINUED | OUTPATIENT
Start: 2025-06-13 | End: 2025-06-15 | Stop reason: HOSPADM

## 2025-06-13 RX ORDER — ONDANSETRON 2 MG/ML
4 INJECTION INTRAMUSCULAR; INTRAVENOUS EVERY 6 HOURS PRN
Status: DISCONTINUED | OUTPATIENT
Start: 2025-06-13 | End: 2025-06-15 | Stop reason: HOSPADM

## 2025-06-13 RX ORDER — TIMOLOL MALEATE 5 MG/ML
1 SOLUTION/ DROPS OPHTHALMIC DAILY
Status: DISCONTINUED | OUTPATIENT
Start: 2025-06-14 | End: 2025-06-15 | Stop reason: HOSPADM

## 2025-06-13 RX ORDER — ACETAMINOPHEN 325 MG/1
650 TABLET ORAL EVERY 4 HOURS PRN
Status: DISCONTINUED | OUTPATIENT
Start: 2025-06-13 | End: 2025-06-15 | Stop reason: HOSPADM

## 2025-06-13 RX ORDER — AMOXICILLIN 250 MG
2 CAPSULE ORAL 2 TIMES DAILY PRN
Status: DISCONTINUED | OUTPATIENT
Start: 2025-06-13 | End: 2025-06-15 | Stop reason: HOSPADM

## 2025-06-13 RX ORDER — AMIODARONE HYDROCHLORIDE 200 MG/1
200 TABLET ORAL
Status: DISCONTINUED | OUTPATIENT
Start: 2025-06-13 | End: 2025-06-14

## 2025-06-13 RX ORDER — SODIUM CHLORIDE 0.9 % (FLUSH) 0.9 %
10 SYRINGE (ML) INJECTION EVERY 12 HOURS SCHEDULED
Status: DISCONTINUED | OUTPATIENT
Start: 2025-06-13 | End: 2025-06-15 | Stop reason: HOSPADM

## 2025-06-13 RX ORDER — AMIODARONE HYDROCHLORIDE 200 MG/1
200 TABLET ORAL 2 TIMES DAILY WITH MEALS
Status: DISCONTINUED | OUTPATIENT
Start: 2025-06-14 | End: 2025-06-15 | Stop reason: HOSPADM

## 2025-06-13 RX ORDER — ASPIRIN 81 MG/1
81 TABLET ORAL DAILY
Status: DISCONTINUED | OUTPATIENT
Start: 2025-06-13 | End: 2025-06-15 | Stop reason: HOSPADM

## 2025-06-13 RX ORDER — ATORVASTATIN CALCIUM 40 MG/1
40 TABLET, FILM COATED ORAL DAILY
Status: DISCONTINUED | OUTPATIENT
Start: 2025-06-13 | End: 2025-06-15 | Stop reason: HOSPADM

## 2025-06-13 RX ORDER — ACETAMINOPHEN 650 MG/1
650 SUPPOSITORY RECTAL EVERY 4 HOURS PRN
Status: DISCONTINUED | OUTPATIENT
Start: 2025-06-13 | End: 2025-06-15 | Stop reason: HOSPADM

## 2025-06-13 RX ORDER — ACETAMINOPHEN 160 MG/5ML
650 SOLUTION ORAL EVERY 4 HOURS PRN
Status: DISCONTINUED | OUTPATIENT
Start: 2025-06-13 | End: 2025-06-15 | Stop reason: HOSPADM

## 2025-06-13 RX ORDER — NITROGLYCERIN 0.4 MG/1
0.4 TABLET SUBLINGUAL
Status: DISCONTINUED | OUTPATIENT
Start: 2025-06-13 | End: 2025-06-15 | Stop reason: HOSPADM

## 2025-06-13 RX ADMIN — NITROGLYCERIN 1 INCH: 20 OINTMENT TOPICAL at 09:08

## 2025-06-13 RX ADMIN — ASPIRIN 81 MG: 81 TABLET, COATED ORAL at 20:16

## 2025-06-13 RX ADMIN — APIXABAN 2.5 MG: 2.5 TABLET, FILM COATED ORAL at 20:16

## 2025-06-13 RX ADMIN — FUROSEMIDE 80 MG: 10 INJECTION, SOLUTION INTRAMUSCULAR; INTRAVENOUS at 09:07

## 2025-06-13 RX ADMIN — Medication 10 ML: at 20:19

## 2025-06-13 RX ADMIN — Medication 10 ML: at 16:06

## 2025-06-13 RX ADMIN — METOPROLOL SUCCINATE 75 MG: 25 TABLET, EXTENDED RELEASE ORAL at 20:16

## 2025-06-13 RX ADMIN — AMIODARONE HYDROCHLORIDE 200 MG: 200 TABLET ORAL at 16:05

## 2025-06-13 RX ADMIN — FUROSEMIDE 40 MG: 10 INJECTION, SOLUTION INTRAMUSCULAR; INTRAVENOUS at 18:00

## 2025-06-13 RX ADMIN — ATORVASTATIN CALCIUM 40 MG: 40 TABLET, FILM COATED ORAL at 20:16

## 2025-06-13 NOTE — PLAN OF CARE
Goal Outcome Evaluation:  Plan of Care Reviewed With: patient, family   Pt reporting no SoA or pain at this time. Pt tolerating RA, HR tachycardic.     Progress: improving

## 2025-06-13 NOTE — CASE MANAGEMENT/SOCIAL WORK
Discharge Planning Assessment   [facility]     Patient Name: Amanda Brown  MRN: [id number]  Today's Date: 6/13/2025    Admit Date: 6/13/2025    Plan: From home with daughter.   Discharge Needs Assessment       Row Name 06/13/25 1101       Living Environment    People in Home child(neftali), adult    Name(s) of People in Home Kt, daughter    Current Living Arrangements home    Potentially Unsafe Housing Conditions none    In the past 12 months has the electric, gas, oil, or water company threatened to shut off services in your home? No    Primary Care Provided by self    Provides Primary Care For no one    Family Caregiver if Needed child(neftali), adult    Family Caregiver Names Daughter Lizabeth    Quality of Family Relationships helpful;involved;supportive    Able to Return to Prior Arrangements yes       Resource/Environmental Concerns    Resource/Environmental Concerns none    Transportation Concerns none       Transportation Needs    In the past 12 months, has lack of transportation kept you from medical appointments or from getting medications? no    In the past 12 months, has lack of transportation kept you from meetings, work, or from getting things needed for daily living? No       Food Insecurity    Within the past 12 months, you worried that your food would run out before you got the money to buy more. Never true    Within the past 12 months, the food you bought just didn't last and you didn't have money to get more. Never true       Transition Planning    Patient/Family Anticipates Transition to home with family    Patient/Family Anticipated Services at Transition none    Transportation Anticipated family or friend will provide       Discharge Needs Assessment    Readmission Within the Last 30 Days no previous admission in last 30 days    Equipment Currently Used at Home none    Concerns to be Addressed denies needs/concerns at this time    Do you want help finding or keeping work or a job? Patient declined    Do  you want help with school or training? For example, starting or completing job training or getting a high school diploma, GED or equivalent Patient declined    Anticipated Changes Related to Illness none    Equipment Needed After Discharge none                   Discharge Plan       Row Name 06/13/25 1102       Plan    Plan From home with daughter.    Patient/Family in Agreement with Plan yes    Plan Comments CM met with patient, daughter (Lizabeth) and 2 sons at bedside. Confirmed PCP, insurance, and pharmacy.  Meds to beds enrolled. Patient denies any difficulty affording medications. Patient not current with any therapies. Family states patient was here 2 weeks ago and upon discharge the AVS stated to stop taking Lasix so they did and believe that is why they are back at the hospital. Patient performs ADL independently, denies any DME use. Confirmed transportation at discharge will be one of her children. D/C Barriers: K+ 5.7, IV lasix, elevated BNP.                Demographic Summary       Row Name 06/13/25 1101       General Information    Admission Type observation    Arrived From emergency department    Referral Source admission list    Reason for Consult discharge planning    Preferred Language English       Contact Information    Permission Granted to Share Info With                    Functional Status       Row Name 06/13/25 1101       Functional Status    Usual Activity Tolerance moderate    Current Activity Tolerance moderate       Functional Status, IADL    Medications independent    Meal Preparation independent    Housekeeping independent    Laundry independent    Shopping independent    If for any reason you need help with day-to-day activities such as bathing, preparing meals, shopping, managing finances, etc., do you get the help you need? I get all the help I need             Lucrecia Silva RN     Office: [phone]

## 2025-06-13 NOTE — H&P
"[facility]  History & Physical    Patient Name: Amanda Brown  : 6/10/1933  MRN: [id number]  Primary Care Physician:  Ranulfo Mims MD  Date of admission: 2025  Date and Time of Service: 2025 at 1000    Subjective      Chief Complaint: GAYLE and peripheral edema    History of Present Illness: Amanda Brown is a 89 y.o. female with a CMH of atrial fibrillation, HTN, GERD, CAD s/p CABG x 4, hx of SVT, HLD, OA who presented to [facility] on 2025 with  GAYLE and peripheral edema.    Patient reports increasing GAYLE and peripheral edema over the last few days.  She was recently admitted here from - with new onset A-fib with RVR and Type II MI.  She underwent heart cath showing patent CABG.  Some medication changes were made including starting Eliquis and amiodarone, increasing metoprolol dose and stopping lisinopril and HCTZ.  Apparently she is supposed to have an OP EVERARDO in the near future.  Last night she had orthopnea and had to sleep with her head propped up.  She has had fatigue and a slight cough.  No CP or palpitations, fevers or chills, dizziness, near-syncope, syncope, urinary sx, GI sx.  Patient says she feels \"fine\".     On arrival to the ED, she was afebrile, , RR 22, blood pressure 166/93, 99% on room air.  Labs are remarkable for BNP 12,425, trop 13, cr 1.14, K 5.7, glucose 155, T bili 1.3, ALT 45, AST 42, hgb 10.9. CXR showed bibasilar infiltrates and small bilateral pleural effusions.      Review of Systems  12 point ROS obtained and negative except as noted in HPI  Personal History     Past Medical History:   Diagnosis Date    Benign essential HTN 2021    Closed fracture of surgical neck of humerus 2022    Closed supracondylar fracture of left humerus 2021    Coronary artery disease involving native coronary artery of native heart without angina pectoris 2016    Fracture, humerus     left    GERD " (gastroesophageal reflux disease)     Mixed hyperlipidemia 09/27/2015    Osteoarthritis 11/30/2021    Paroxysmal SVT (supraventricular tachycardia) 01/07/2024    Pneumonia due to infectious organism 01/07/2024    PONV (postoperative nausea and vomiting)        Past Surgical History:   Procedure Laterality Date    ARTERIAL BYPASS SURGERY      CARDIAC CATHETERIZATION      CARDIAC CATHETERIZATION Right 5/26/2025    Procedure: Left Heart Cath;  Surgeon: Win Garzon MD;  Location: [facility] CATH INVASIVE LOCATION;  Service: Cardiovascular;  Laterality: Right;    CARDIAC SURGERY  2009    CABG 4V    CORONARY ARTERY BYPASS GRAFT         Family History: family history includes Asthma in her father; Lymphoma in her mother; Stroke in her mother. Otherwise pertinent FHx was reviewed and not pertinent to current issue.    Social History:  reports that she has never smoked. She has never been exposed to tobacco smoke. She has never used smokeless tobacco. She reports that she does not drink alcohol and does not use drugs.    Home Medications:  Prior to Admission Medications       Prescriptions Last Dose Informant Patient Reported? Taking?    amiodarone (PACERONE) 200 MG tablet   No Yes    Take 1 tablet every 8 hours until 6/2, then take 1 tablet every 12 hours until 6/16, then take 1 tablet daily    apixaban (ELIQUIS) 2.5 MG tablet tablet   No Yes    Take 1 tablet by mouth Every 12 (Twelve) Hours. Indications: Atrial Fibrillation    aspirin 81 MG EC tablet   Yes Yes    Take 1 tablet by mouth Daily. LD 11/29    atorvastatin (LIPITOR) 40 MG tablet   Yes Yes    Take 1 tablet by mouth Daily.    B Complex Vitamins (VITAMIN B COMPLEX PO)   Yes Yes    Take  by mouth 2 (Two) Times a Week.    Lumigan 0.01 % ophthalmic drops   Yes Yes    Administer 1 drop to both eyes Every Night.    meclizine (ANTIVERT) 25 MG tablet   Yes Yes    Take 1 tablet by mouth Daily.    metoprolol succinate XL (TOPROL-XL) 25 MG 24 hr tablet   No Yes    Take 3  tablets by mouth 2 (Two) Times a Day.    omeprazole (priLOSEC) 20 MG capsule   Yes No    Take 1 capsule by mouth 4 (Four) Times a Week.    timolol (TIMOPTIC) 0.5 % ophthalmic solution   Yes No    timolol maleate 0.5 % eye drops              Allergies:  No Known Allergies    Objective      Vitals:   Temp:  [97.6 °F (36.4 °C)] 97.6 °F (36.4 °C)  Heart Rate:  [100-112] 112  Resp:  [18-22] 18  BP: (160-178)/() 160/99  Body mass index is 26.78 kg/m².  Physical Exam  Constitutional:       Appearance: Normal appearance.   HENT:      Head: Normocephalic and atraumatic.      Mouth/Throat:      Mouth: Mucous membranes are moist.   Eyes:      Extraocular Movements: Extraocular movements intact.   Cardiovascular:      Rate and Rhythm: Normal rate and regular rhythm.   Pulmonary:      Effort: Pulmonary effort is normal.      Comments: Diminished breath sounds in the bases  Abdominal:      General: There is no distension.      Palpations: Abdomen is soft.      Tenderness: There is no abdominal tenderness.   Musculoskeletal:         General: Normal range of motion.      Cervical back: Normal range of motion. No rigidity.      Right lower leg: Edema (1+) present.      Left lower leg: Edema (1+) present.   Skin:     General: Skin is warm.   Neurological:      General: No focal deficit present.      Mental Status: She is alert and oriented to person, place, and time.         Diagnostic Data:  Lab Results (last 24 hours)       Procedure Component Value Units Date/Time    BNP [[id number]]  (Abnormal) Collected: 06/13/25 0812    Specimen: Blood from Arm, Left Updated: 06/13/25 0858     proBNP 12,425.0 pg/mL     Narrative:      This assay is used as an aid in the diagnosis of individuals suspected of having heart failure. It can be used as an aid in the diagnosis of acute decompensated heart failure (ADHF) in patients presenting with signs and symptoms of ADHF to the emergency department (ED). In addition, NT-proBNP of <300 pg/mL  indicates ADHF is not likely.    Age Range Result Interpretation  NT-proBNP Concentration (pg/mL:      <50             Positive            >450                   Gray                 300-450                    Negative             <300    50-75           Positive            >900                  Gray                300-900                  Negative            <300      >75             Positive            >1800                  Gray                300-1800                  Negative            <300    Comprehensive Metabolic Panel [[id number]]  (Abnormal) Collected: 06/13/25 0812    Specimen: Blood from Arm, Left Updated: 06/13/25 0841     Glucose 155 mg/dL      BUN 17.9 mg/dL      Creatinine 1.14 mg/dL      Sodium 139 mmol/L      Potassium 5.7 mmol/L      Chloride 103 mmol/L      CO2 25.0 mmol/L      Calcium 9.3 mg/dL      Total Protein 7.0 g/dL      Albumin 4.1 g/dL      ALT (SGPT) 45 U/L      AST (SGOT) 42 U/L      Alkaline Phosphatase 102 U/L      Total Bilirubin 1.3 mg/dL      Globulin 2.9 gm/dL      A/G Ratio 1.4 g/dL      BUN/Creatinine Ratio 15.7     Anion Gap 11.0 mmol/L      eGFR 45.3 mL/min/1.73     Narrative:      GFR Categories in Chronic Kidney Disease (CKD)              GFR Category          GFR (mL/min/1.73)    Interpretation  G1                    90 or greater        Normal or high (1)  G2                    60-89                Mild decrease (1)  G3a                   45-59                Mild to moderate decrease  G3b                   30-44                Moderate to severe decrease  G4                    15-29                Severe decrease  G5                    14 or less           Kidney failure    (1)In the absence of evidence of kidney disease, neither GFR category G1 or G2 fulfill the criteria for CKD.    eGFR calculation 2021 CKD-EPI creatinine equation, which does not include race as a factor    High Sensitivity Troponin T [[id number]]  (Normal) Collected: 06/13/25 0812    Specimen: Blood  from Arm, Left Updated: 06/13/25 0841     HS Troponin T 13 ng/L     Narrative:      High Sensitive Troponin T Reference Range:  <14.0 ng/L- Negative Female for AMI  <22.0 ng/L- Negative Male for AMI  >=14 - Abnormal Female indicating possible myocardial injury.  >=22 - Abnormal Male indicating possible myocardial injury.   Clinicians would have to utilize clinical acumen, EKG, Troponin, and serial changes to determine if it is an Acute Myocardial Infarction or myocardial injury due to an underlying chronic condition.         Extra Tubes [[id number]] Collected: 06/13/25 0812    Specimen: Blood from Arm, Left Updated: 06/13/25 0831    Narrative:      The following orders were created for panel order Extra Tubes.  Procedure                               Abnormality         Status                     ---------                               -----------         ------                     Gold Top - SST[[id number]]                                   Final result               Light Blue Top[[id number]]                                   Final result                 Please view results for these tests on the individual orders.    Gold Top - SST [[id number]] Collected: 06/13/25 0812    Specimen: Blood from Arm, Left Updated: 06/13/25 0831     Extra Tube Hold for add-ons.     Comment: Auto resulted.       Light Blue Top [[id number]] Collected: 06/13/25 0812    Specimen: Blood from Arm, Left Updated: 06/13/25 0831     Extra Tube Hold for add-ons.     Comment: Auto resulted       CBC & Differential [[id number]]  (Abnormal) Collected: 06/13/25 0812    Specimen: Blood from Arm, Left Updated: 06/13/25 0818    Narrative:      The following orders were created for panel order CBC & Differential.  Procedure                               Abnormality         Status                     ---------                               -----------         ------                     CBC Auto Differential[[id number]]        Abnormal            Final result                  Please view results for these tests on the individual orders.    CBC Auto Differential [[id number]]  (Abnormal) Collected: 06/13/25 0812    Specimen: Blood from Arm, Left Updated: 06/13/25 0818     WBC 10.35 10*3/mm3      RBC 3.53 10*6/mm3      Hemoglobin 10.9 g/dL      Hematocrit 34.3 %      MCV 97.2 fL      MCH 30.9 pg      MCHC 31.8 g/dL      RDW 14.2 %      RDW-SD 48.8 fl      MPV 10.6 fL      Platelets 308 10*3/mm3      Neutrophil % 76.2 %      Lymphocyte % 15.0 %      Monocyte % 5.9 %      Eosinophil % 1.8 %      Basophil % 0.5 %      Immature Grans % 0.6 %      Neutrophils, Absolute 7.89 10*3/mm3      Lymphocytes, Absolute 1.55 10*3/mm3      Monocytes, Absolute 0.61 10*3/mm3      Eosinophils, Absolute 0.19 10*3/mm3      Basophils, Absolute 0.05 10*3/mm3      Immature Grans, Absolute 0.06 10*3/mm3      nRBC 0.0 /100 WBC              Imaging Results (Last 24 Hours)       Procedure Component Value Units Date/Time    XR Chest 1 View [[id number]] Collected: 06/13/25 0809     Updated: 06/13/25 0812    Narrative:      XR CHEST 1 VW    Date of Exam: 6/13/2025 7:51 AM EDT    Indication: soa    Comparison: 5/25/2025 at 0337 hours    Findings:  Bibasilar infiltrates are noted. There are small bilateral pleural effusions. The cardiac silhouette and mediastinum are stable. Postoperative changes are noted. There is remote fracture deformity of the proximal left humerus. No acute osseous   abnormalities are identified.      Impression:      Impression:  Evidence for developing bibasilar infiltrates and small bilateral pleural effusions. These findings may be related to developing bibasilar pneumonia. Changes of CHF and pulmonary edema could also be considered.      Electronically Signed: Enrike Riojas MD    6/13/2025 8:10 AM EDT    Workstation ID: [id number]              Assessment & Plan        This is a 89 y.o. female with:    Active and Resolved Problems  Active Hospital Problems    Diagnosis  POA    **Acute  CHF [I50.9]  Yes      Resolved Hospital Problems   No resolved problems to display.       Acute HF with preserved EF  Mitral valve regurgitation  - Cxr showed small osmany pleural effusions and bibasilar infiltrates  - Prior ECHO pertinent results (5/25/25)  - LVEF 56-60%, grade III w/ high LAP LV diastolic dysfunction, moderate MR, moderate TR, moderately elevated right ventricular systolic pressure  - Start IV Lasix 40 mg BID. Received 80 mg IV x 1 in ED.  - Strict intake/output, Daily weights, Low sodium diet  - Continue metoprolol. Not currently on an ACE-I or ARB.   - Follow up with HF clinic on outpatient basis  - Cardiology consulted  - LFTs are slightly abnormal which could be due to hepatic congestion from CHF    Paroxysmal atrial fibrillation  - Currently rate controlled  - Continue amiodarone, metoprolol and Eliquis     CAD  - S/p CABG x 4 in 2009  - Continue aspirin, metoprolol and statin    Hyperkalemia  - Likely hemolysis of labs. Received IV Lasix.  Repeat labs    HTN  - Elevated on admission, resume antihypertensives and follow BP    GERD  - Continue PPI      VTE Prophylaxis:  Mechanical VTE prophylaxis orders are present.        The patient desires to be as follows:    CODE STATUS:    Code Status (Patient has no pulse and is not breathing): CPR (Attempt to Resuscitate)  Medical Interventions (Patient has pulse or is breathing): Full Support        Lizabeth Brown, who can be contacted at [phone], is the designated person to make medical decisions on the patient's behalf if She is incapable of doing so. This was clarified with patient and/or next of kin on 6/13/2025 during the course of this H&P.    Admission Status:  I believe this patient meets obs status.    Expected Length of Stay: 1 day    PDMP and Medication Dispenses via Sidebar reviewed and consistent with patient reported medications.    I discussed the patient's findings and my recommendations with patient, family, nursing staff, and  consulting provider.      Signature:     This document has been electronically signed by Jaqueline Osborne PA-C on June 13, 2025 11:28 EDT   [facility] Hospitalist Team

## 2025-06-13 NOTE — CONSULTS
Cardiology attending:  Seen and examined.  Chart and labs reviewed. Independent interpretations of cardiac testing was performed. History and exam findings are verified.  Assessment and plan notated by APC after being formulated by attending consultant.  Note that greater than 50% of the time spent in care of the patient was provided by attending consultant.    Patient is a 89-year-old female with history of CAD status post remote CABG in 2009, SVT status post cardioversion, hypertension hyperlipidemia, who was initially admitted to the hospital with shortness of breath.  She was recently diagnosed with atrial fibrillation in May 2025, placed on amiodarone and Eliquis.    On exam, she has signs of mild volume overload.  For now, I agree with IV diuresis, and consider EVERARDO/cardioversion once patient is approaching euvolemia.  She is on 40 mg furosemide IV twice daily and reports improvement of symptoms since yesterday.  Close monitoring of intakes and outputs, renal function and daily weights.    Electronically signed by Acosta Llanes MD, 06/13/25, 5:20 PM EDT.          Referring Provider: Shravan Patricio MD    Reason for Consultation: CHF exacerbation, A-fib      Patient Care Team:  Ranulfo Mims MD as PCP - General (Internal Medicine)  Deep Aceves MD as Consulting Physician (Cardiology)      SUBJECTIVE     Chief Complaint: Dyspnea    History of present illness:  Amanda Brown is a 89 y.o. female with a history of SVT status post successful cardioversion, coronary artery disease status post CABG, hypertension and hyperlipidemia, congestive heart failure who presented to [facility] with complaint of dyspnea, orthopnea.  She woke up this morning and had to immediately sit up because she was having difficulty breathing.  On exam she looks fluid overloaded and she got 80 mg IV one-time in the ER of Lasix.  She denies chest pain and palpitations but is still short of breath and  is slightly short of breath during conversation.  She was not discharged on losartan or her normal water pill of hydrochlorothiazide 2 weeks ago.     The patient was just discharged from the hospital 520 to hide 2025 after undergoing a cardiac catheterization which showed severe left coronary artery disease, SVG graft to marginal and LIMA to LAD was patent.  RCA was not grafted.  It is free of significant disease.  She was also noted to have mitral regurgitation.       Chest x-ray in the urgent emergency room today showed evidence for developing bibasilar infiltrates and small bilateral pleural effusions. These findings may be related to developing bibasilar pneumonia. Changes of CHF and pulmonary edema could also be considered.       Most recent echocardiogram from 5/25/2025 shows a LVEF to be 56 to 60%,LV diastolic function grade 3 with high LAP, reversible restrictive pattern, left atrial cavity is moderately dilated, moderate to severe mitral valve regurgitation.  Moderate tricuspid valve regurgitation with right ventricular systolic pressure 45-55.     Review of Systems   Constitutional:  Negative for activity change, appetite change and fatigue.   HENT: Negative.     Eyes: Negative.    Respiratory:  Positive for shortness of breath. Negative for cough, chest tightness, wheezing and stridor.    Cardiovascular:  Positive for leg swelling. Negative for chest pain and palpitations.   Gastrointestinal:  Negative for abdominal distention, abdominal pain, nausea and indigestion.   Endocrine: Negative.    Genitourinary: Negative.    Musculoskeletal: Negative.    Skin:  Negative for color change and pallor.   Allergic/Immunologic: Negative.    Neurological:  Negative for dizziness, weakness, light-headedness and headache.   Hematological: Negative.    Psychiatric/Behavioral: Negative.               Personal History:      Past Medical History:   Diagnosis Date    Benign essential HTN 11/30/2021    Closed fracture of  surgical neck of humerus 02/21/2022    Closed supracondylar fracture of left humerus 11/30/2021    Coronary artery disease involving native coronary artery of native heart without angina pectoris 03/27/2016    Fracture, humerus 2021    left    GERD (gastroesophageal reflux disease)     Mixed hyperlipidemia 09/27/2015    Osteoarthritis 11/30/2021    Paroxysmal SVT (supraventricular tachycardia) 01/07/2024    Pneumonia due to infectious organism 01/07/2024    PONV (postoperative nausea and vomiting)        Past Surgical History:   Procedure Laterality Date    ARTERIAL BYPASS SURGERY      CARDIAC CATHETERIZATION      CARDIAC CATHETERIZATION Right 5/26/2025    Procedure: Left Heart Cath;  Surgeon: Win Garzon MD;  Location: [facility] CATH INVASIVE LOC[facility];  Service: Cardiovascular;  Laterality: Right;    CARDIAC SURGERY  2009    CABG 4V    CORONARY ARTERY BYPASS GRAFT         Family History   Problem Relation Age of Onset    Lymphoma Mother     Stroke Mother     Asthma Father        Social History     Tobacco Use    Smoking status: Never     Passive exposure: Never    Smokeless tobacco: Never   Vaping Use    Vaping status: Never Used   Substance Use Topics    Alcohol use: Never    Drug use: Never        Home meds:  Prior to Admission medications    Medication Sig Start Date End Date Taking? Authorizing Provider   amiodarone (PACERONE) 200 MG tablet Take 1 tablet every 8 hours until 6/2, then take 1 tablet every 12 hours until 6/16, then take 1 tablet daily 5/29/25  Yes eDb Montes DO   apixaban (ELIQUIS) 2.5 MG tablet tablet Take 1 tablet by mouth Every 12 (Twelve) Hours. Indications: Atrial Fibrillation 5/29/25  Yes Deb Montes DO   aspirin 81 MG EC tablet Take 1 tablet by mouth Daily. LD 11/29   Yes ProviderEdis MD   atorvastatin (LIPITOR) 40 MG tablet Take 1 tablet by mouth Daily. 9/28/23  Yes ProviderEdis MD   B Complex Vitamins (VITAMIN B COMPLEX PO) Take  by mouth 2 (Two) Times a Week.    "Yes Provider, MD Edis   Lumigan 0.01 % ophthalmic drops Administer 1 drop to both eyes Every Night. 7/8/24  Yes ProviderEdis MD   meclizine (ANTIVERT) 25 MG tablet Take 1 tablet by mouth Daily. 3/21/25  Yes Edis Hsu MD   metoprolol succinate XL (TOPROL-XL) 25 MG 24 hr tablet Take 3 tablets by mouth 2 (Two) Times a Day. 5/29/25  Yes Deb Montes,    omeprazole (priLOSEC) 20 MG capsule Take 1 capsule by mouth 4 (Four) Times a Week.    Provider, MD Edis   timolol (TIMOPTIC) 0.5 % ophthalmic solution timolol maleate 0.5 % eye drops    Provider, MD Edis       Allergies:     Patient has no known allergies.    Scheduled Meds:furosemide, 40 mg, Intravenous, BID Diuretics  sodium chloride, 10 mL, Intravenous, Q12H      Continuous Infusions:   PRN Meds:  acetaminophen **OR** acetaminophen **OR** acetaminophen    senna-docusate sodium **AND** polyethylene glycol **AND** bisacodyl **AND** bisacodyl    Calcium Replacement - Follow Nurse / BPA Driven Protocol    Magnesium Cardiology Dose Replacement - Follow Nurse / BPA Driven Protocol    melatonin    nitroglycerin    ondansetron ODT **OR** ondansetron    Phosphorus Replacement - Follow Nurse / BPA Driven Protocol    Potassium Replacement - Follow Nurse / BPA Driven Protocol    [COMPLETED] Insert Peripheral IV **AND** sodium chloride    sodium chloride    sodium chloride      OBJECTIVE    Vital Signs  Vitals:    06/13/25 0910 06/13/25 1002 06/13/25 1016 06/13/25 1240   BP: (!) 167/104 178/98 160/99 (!) 158/109   BP Location:    Right arm   Patient Position:    Lying   Pulse: 109 104 112 118   Resp: 18   17   Temp:    97.9 °F (36.6 °C)   TempSrc:    Oral   SpO2: 97% 98% 97% 97%   Weight:       Height:           Flowsheet Rows      Flowsheet Row First Filed Value   Admission Height 154.9 cm (61\") Documented at 06/13/2025 0711   Admission Weight 64.3 kg (141 lb 12.1 oz) Documented at 06/13/2025 0711            No intake or output data " in the 24 hours ending 06/13/25 1427     Telemetry: A-fib rates 90s to low 100s    Physical Exam     Constitutional:       General: She is not in acute distress.     Appearance: Normal appearance. .   HENT:      Head: Normocephalic and atraumatic.   Eyes:      Extraocular Movements: Extraocular movements intact.      Pupils: Pupils are equal, round, and reactive to light.   Cardiovascular:      Rate and Rhythm: Irregular rate and irregular rhythm.   Pulmonary:      Effort: Pulmonary effort is normal.      Breath sounds: Normal breath sounds.   Abdominal:      General: Abdomen slightly distended. Bowel sounds are normal.      Palpations: Abdomen is soft.   Musculoskeletal:      Cervical back: Normal range of motion.   Skin:     General: Skin is warm and dry.   Neurological:      General: No focal deficit present.      Mental Status: He is alert and oriented to person, place, and time. Mental status is at baseline.   Psychiatric:         Mood and Affect: Mood normal.         Behavior: Behavior normal.         Thought Content: Thought content normal.     Results Review:  I have personally reviewed the results from the time of this admission to 6/13/2025 14:27 EDT and agree with these findings:  [x]  Laboratory  [x]  Microbiology  [x]  Radiology  [x]  EKG/Telemetry   [x]  Cardiology/Vascular   [x]  Pathology  [x]  Old records  []  Other:    Most notable findings include:     Lab Results (last 24 hours)       Procedure Component Value Units Date/Time    BNP [[id number]]  (Abnormal) Collected: 06/13/25 0812    Specimen: Blood from Arm, Left Updated: 06/13/25 0858     proBNP 12,425.0 pg/mL     Narrative:      This assay is used as an aid in the diagnosis of individuals suspected of having heart failure. It can be used as an aid in the diagnosis of acute decompensated heart failure (ADHF) in patients presenting with signs and symptoms of ADHF to the emergency department (ED). In addition, NT-proBNP of <300 pg/mL indicates  ADHF is not likely.    Age Range Result Interpretation  NT-proBNP Concentration (pg/mL:      <50             Positive            >450                   Gray                 300-450                    Negative             <300    50-75           Positive            >900                  Gray                300-900                  Negative            <300      >75             Positive            >1800                  Gray                300-1800                  Negative            <300    Comprehensive Metabolic Panel [[id number]]  (Abnormal) Collected: 06/13/25 0812    Specimen: Blood from Arm, Left Updated: 06/13/25 0841     Glucose 155 mg/dL      BUN 17.9 mg/dL      Creatinine 1.14 mg/dL      Sodium 139 mmol/L      Potassium 5.7 mmol/L      Chloride 103 mmol/L      CO2 25.0 mmol/L      Calcium 9.3 mg/dL      Total Protein 7.0 g/dL      Albumin 4.1 g/dL      ALT (SGPT) 45 U/L      AST (SGOT) 42 U/L      Alkaline Phosphatase 102 U/L      Total Bilirubin 1.3 mg/dL      Globulin 2.9 gm/dL      A/G Ratio 1.4 g/dL      BUN/Creatinine Ratio 15.7     Anion Gap 11.0 mmol/L      eGFR 45.3 mL/min/1.73     Narrative:      GFR Categories in Chronic Kidney Disease (CKD)              GFR Category          GFR (mL/min/1.73)    Interpretation  G1                    90 or greater        Normal or high (1)  G2                    60-89                Mild decrease (1)  G3a                   45-59                Mild to moderate decrease  G3b                   30-44                Moderate to severe decrease  G4                    15-29                Severe decrease  G5                    14 or less           Kidney failure    (1)In the absence of evidence of kidney disease, neither GFR category G1 or G2 fulfill the criteria for CKD.    eGFR calculation 2021 CKD-EPI creatinine equation, which does not include race as a factor    High Sensitivity Troponin T [[id number]]  (Normal) Collected: 06/13/25 0812    Specimen: Blood from Arm,  Left Updated: 06/13/25 0841     HS Troponin T 13 ng/L     Narrative:      High Sensitive Troponin T Reference Range:  <14.0 ng/L- Negative Female for AMI  <22.0 ng/L- Negative Male for AMI  >=14 - Abnormal Female indicating possible myocardial injury.  >=22 - Abnormal Male indicating possible myocardial injury.   Clinicians would have to utilize clinical acumen, EKG, Troponin, and serial changes to determine if it is an Acute Myocardial Infarction or myocardial injury due to an underlying chronic condition.         Extra Tubes [[id number]] Collected: 06/13/25 0812    Specimen: Blood from Arm, Left Updated: 06/13/25 0831    Narrative:      The following orders were created for panel order Extra Tubes.  Procedure                               Abnormality         Status                     ---------                               -----------         ------                     Gold Top - SST[[id number]]                                   Final result               Light Blue Top[[id number]]                                   Final result                 Please view results for these tests on the individual orders.    Gold Top - SST [[id number]] Collected: 06/13/25 0812    Specimen: Blood from Arm, Left Updated: 06/13/25 0831     Extra Tube Hold for add-ons.     Comment: Auto resulted.       Light Blue Top [[id number]] Collected: 06/13/25 0812    Specimen: Blood from Arm, Left Updated: 06/13/25 0831     Extra Tube Hold for add-ons.     Comment: Auto resulted       CBC & Differential [[id number]]  (Abnormal) Collected: 06/13/25 0812    Specimen: Blood from Arm, Left Updated: 06/13/25 0818    Narrative:      The following orders were created for panel order CBC & Differential.  Procedure                               Abnormality         Status                     ---------                               -----------         ------                     CBC Auto Differential[[id number]]        Abnormal            Final result                  Please view results for these tests on the individual orders.    CBC Auto Differential [[id number]]  (Abnormal) Collected: 06/13/25 0812    Specimen: Blood from Arm, Left Updated: 06/13/25 0818     WBC 10.35 10*3/mm3      RBC 3.53 10*6/mm3      Hemoglobin 10.9 g/dL      Hematocrit 34.3 %      MCV 97.2 fL      MCH 30.9 pg      MCHC 31.8 g/dL      RDW 14.2 %      RDW-SD 48.8 fl      MPV 10.6 fL      Platelets 308 10*3/mm3      Neutrophil % 76.2 %      Lymphocyte % 15.0 %      Monocyte % 5.9 %      Eosinophil % 1.8 %      Basophil % 0.5 %      Immature Grans % 0.6 %      Neutrophils, Absolute 7.89 10*3/mm3      Lymphocytes, Absolute 1.55 10*3/mm3      Monocytes, Absolute 0.61 10*3/mm3      Eosinophils, Absolute 0.19 10*3/mm3      Basophils, Absolute 0.05 10*3/mm3      Immature Grans, Absolute 0.06 10*3/mm3      nRBC 0.0 /100 WBC             Imaging Results (Last 24 Hours)       Procedure Component Value Units Date/Time    XR Chest 1 View [[id number]] Collected: 06/13/25 0809     Updated: 06/13/25 0812    Narrative:      XR CHEST 1 VW    Date of Exam: 6/13/2025 7:51 AM EDT    Indication: soa    Comparison: 5/25/2025 at 0337 hours    Findings:  Bibasilar infiltrates are noted. There are small bilateral pleural effusions. The cardiac silhouette and mediastinum are stable. Postoperative changes are noted. There is remote fracture deformity of the proximal left humerus. No acute osseous   abnormalities are identified.      Impression:      Impression:  Evidence for developing bibasilar infiltrates and small bilateral pleural effusions. These findings may be related to developing bibasilar pneumonia. Changes of CHF and pulmonary edema could also be considered.      Electronically Signed: Enrike Riojas MD    6/13/2025 8:10 AM EDT    Workstation ID: [id number]            LAB RESULTS (LAST 7 DAYS)    CBC  Results from last 7 days   Lab Units 06/13/25 0812   WBC 10*3/mm3 10.35   RBC 10*6/mm3 3.53*   HEMOGLOBIN g/dL 10.9*    HEMATOCRIT % 34.3   MCV fL 97.2*   PLATELETS 10*3/mm3 308       BMP  Results from last 7 days   Lab Units 06/13/25  0812   SODIUM mmol/L 139   POTASSIUM mmol/L 5.7*   CHLORIDE mmol/L 103   CO2 mmol/L 25.0   BUN mg/dL 17.9   CREATININE mg/dL 1.14*   GLUCOSE mg/dL 155*       CMP   Results from last 7 days   Lab Units 06/13/25  0812   SODIUM mmol/L 139   POTASSIUM mmol/L 5.7*   CHLORIDE mmol/L 103   CO2 mmol/L 25.0   BUN mg/dL 17.9   CREATININE mg/dL 1.14*   GLUCOSE mg/dL 155*   ALBUMIN g/dL 4.1   BILIRUBIN mg/dL 1.3*   ALK PHOS U/L 102   AST (SGOT) U/L 42*   ALT (SGPT) U/L 45*       BNP      12,425    TROPONIN  Results from last 7 days   Lab Units 06/13/25  0812   HSTROP T ng/L 13       CoAg    Eliquis    Creatinine Clearance  Estimated Creatinine Clearance: 27 mL/min (A) (by C-G formula based on SCr of 1.14 mg/dL (H)).            Radiology  XR Chest 1 View  Result Date: 6/13/2025  Impression: Evidence for developing bibasilar infiltrates and small bilateral pleural effusions. These findings may be related to developing bibasilar pneumonia. Changes of CHF and pulmonary edema could also be considered. Electronically Signed: Enrike Riojas MD  6/13/2025 8:10 AM EDT  Workstation ID: [id number]        EKG  I personally viewed and interpreted the patient's EKG/Telemetry data:  ECG 12 Lead Dyspnea   Preliminary Result   HEART FWJE=771  bpm   RR Wnzywpvk=531  ms   MA Interval=  ms   P Horizontal Axis=  deg   P Front Axis=  deg   QRSD Qpwiyzwh=214  ms   QT Yxttsivo=695  ms   QExB=414  ms   QRS Axis=48  deg   T Wave Axis=-39  deg   - ABNORMAL ECG -   Atrial flutter with predominant 2:1 AV block   Right bundle branch block   Prolonged QT interval   When compared with ECG of 29-May-2025 05:39:49,   Significant change in rhythm: previously atrial fibrillation   Date and Time of Study:2025-06-13 07:16:49            Echocardiogram:    Results for orders placed during the hospital encounter of 05/25/25    Adult Transthoracic Echo  Complete w/ Color, Spectral and Contrast if necessary per protocol    Interpretation Summary    Left ventricular ejection fraction appears to be 56 - 60%.    Left ventricular diastolic function is consistent with (grade III w/high LAP) reversible restrictive pattern.    The left atrial cavity is moderately dilated.    Moderate to severe mitral valve regurgitation is present.    Moderate tricuspid valve regurgitation is present.    Estimated right ventricular systolic pressure from tricuspid regurgitation is moderately elevated (45-55 mmHg).            Cardiac Catheterization:  Results for orders placed during the hospital encounter of 05/25/25    Cardiac Catheterization/Vascular Study    Conclusion  OPERATORS  Win Garzon M.D. (Attending Cardiologist)      PROCEDURES PERFORMED  Ultrasound guided Vascular access  Left Heart Catheterization  Coronary Angiogram  Bypass angiogram  Moderate sedation    INDICATIONS FOR PROCEDURE  89 years old woman with multiple cardiovascular risk factors, known coronary disease with previous CABG presented with chest pain, A-fib RVR, significantly elevated troponin increasing up to 600.  After discussing the risk and benefits of the procedure she was offered diagnostic coronary angiography.    PROCEDURE IN DETAIL  Informed consent was obtained from the patient after explaining the risks, benefits, and alternative options of the procedure. After obtaining informed consent, the patient was brought to the cath lab and was prepped in a sterile fashion. Lidocaine 2% was used for local anesthesia into the right femoral access site. The right femoral artery was accessed with a micropuncture needle via modified Seldinger technique under ultrasound guidance. A 6F was inserted successfully. Afterwards, 6F JR4 and JL4 diagnostic catheters were advanced over a wire into the ascending aorta and were used to engage the ostia of the left main and RCA respectively. JR4 used to cross the AV and obtain  LV pressures and gradient across the AV measured via pullback technique.  JR4 catheter was also used to selectively engage the vein graft supplying the RCA and left circumflex.  JR4 was used to selectively engage left subclavian artery and this catheter was exchanged for IM catheter.  Selective IM angiography was performed.  Images of the right and left coronary systems were obtained. All the catheters were exchanged over a wire and subsequently removed.  The patient tolerated the procedure well without any complications. The pictures were reviewed at the end of the procedure.  Sheath was left in place for removal and manual pressure    HEMODYNAMICS    LV: 118/14, 21 mmHg  AO: 113/53, 80 mmHg  Gradient: No significant gradient across the aortic valve during pullback of JR4 catheter.  LV gram was not performed due to recently available echocardiogram and chronic kidney disease    FINDINGS  Coronary Angiogram    Right dominant circulation    Left main: There is no left main.  Separate ostia for left circumflex and LAD.    Left Anterior Descending Artery: LAD is patent in the proximal segment and provides a medium caliber tortuous diagonal vessel.  Mid LAD is 100% occluded    Left Circumflex: Left circumflex artery is 100% occluded in the proximal segment    Right Coronary Artery: The RCA is 100% occluded in the proximal segment    Bypass angiography  Vein graft supplying the RCA is widely patent at the origin, body and anastomosis  Vein graft supplying the left circumflex artery is widely patent at the origin, body and anastomosis  LIMA to LAD is widely patent at the origin, body and anastomosis    ESTIMATED BLOOD LOSS:  10 ml    COMPLICATIONS:  None    PROCEDURE DATA:  Contrast Used: 50 cc  Sedation Time: 30 minutes    IMPRESSIONS  Severe three-vessel native obstructive coronary disease  Patent LIMA to LAD, vein graft to LCx and vein graft to RCA  Elevated LVEDP    RECOMMENDATIONS  - Diuresis, management of A-fib  RVR  -Medical management of coronary artery disease  -Workup for transcatheter [id number] mitral valve repair    Electronically signed by Win Garzon MD, 05/26/25, 2:40 PM EDT.        Other:    ASCVD Risk Score::  The ASCVD Risk score (Priscilla STRONG, et al., 2019) failed to calculate for the following reasons:    The 2019 ASCVD risk score is only valid for ages 40 to 79    Risk score cannot be calculated because patient has a medical history suggesting prior/existing ASCVD          ASSESSMENT & PLAN:    Principal Problem:    Acute CHF    Principal problem: Acute on chronic congestive heart failure  80 mg IV lasix once in the ER  40  mg IV lasix q12h  Recent echo on last admission 5/5025  Atrial fibrillation  Metoprolol 75 mg twice daily  Amiodarone 200 mg twice daily- change to daily  Hypertension  Volume overload  Hyperkalemia  IV lasix with associated hypervolemia  Coronary artery disease status post CABG  [facility] 5/27/2025 showed significant blockages but that her bypass grafts were patent      Current tachycardia and hypertension suspected to be due to significant volume overload along with associated atrial fibrillation   Consider future EVERARDO to assess mitral valve with a possible cardioversion- patient has not been anticoagulated for 4 weeks at this time  Decrease amiodarone to once dialy  Continue diuresis   Monitor blood pressure and electrolytes  Replace electrolytes as needed to keep potassium at 4 and magnesium at 2    ADEEL Phelps  06/13/25  14:27 EDT

## 2025-06-13 NOTE — ED PROVIDER NOTES
Subjective   History of Present Illness  89-year-old female presents with shortness of breath.  She states she has had this for a few days.  She gets out of breath with exertion just going from bed to bathroom.  She does not complain of chest pain.  She has a little bit of cough but she states nothing major.  She had fallen 9 days ago.  She is currently on anticoagulation due to atrial fibrillation.  She has had some decreased p.o. intake.  She states she did not sleep last night due to being short of breath.  She states she has to sit up.  She does have swelling in her legs but reports this is not new.  Review of Systems    Past Medical History:   Diagnosis Date    Benign essential HTN 11/30/2021    Closed fracture of surgical neck of humerus 02/21/2022    Closed supracondylar fracture of left humerus 11/30/2021    Coronary artery disease involving native coronary artery of native heart without angina pectoris 03/27/2016    Fracture, humerus 2021    left    GERD (gastroesophageal reflux disease)     Mixed hyperlipidemia 09/27/2015    Osteoarthritis 11/30/2021    Paroxysmal SVT (supraventricular tachycardia) 01/07/2024    Pneumonia due to infectious organism 01/07/2024    PONV (postoperative nausea and vomiting)        No Known Allergies    Past Surgical History:   Procedure Laterality Date    ARTERIAL BYPASS SURGERY      CARDIAC CATHETERIZATION      CARDIAC CATHETERIZATION Right 5/26/2025    Procedure: Left Heart Cath;  Surgeon: Win Garzon MD;  Location: [facility] CATH INVASIVE LOCATION;  Service: Cardiovascular;  Laterality: Right;    CARDIAC SURGERY  2009    CABG 4V    CORONARY ARTERY BYPASS GRAFT         Family History   Problem Relation Age of Onset    Lymphoma Mother     Stroke Mother     Asthma Father        Social History     Socioeconomic History    Marital status:    Tobacco Use    Smoking status: Never     Passive exposure: Never    Smokeless tobacco: Never   Vaping Use    Vaping status: Never Used    Substance and Sexual Activity    Alcohol use: Never    Drug use: Never    Sexual activity: Defer     Prior to Admission medications    Medication Sig Start Date End Date Taking? Authorizing Provider   amiodarone (PACERONE) 200 MG tablet Take 1 tablet every 8 hours until 6/2, then take 1 tablet every 12 hours until 6/16, then take 1 tablet daily 5/29/25   Deb Montes DO   apixaban (ELIQUIS) 2.5 MG tablet tablet Take 1 tablet by mouth Every 12 (Twelve) Hours. Indications: Atrial Fibrillation 5/29/25   Deb Montes DO   aspirin 81 MG EC tablet Take 1 tablet by mouth Daily. LD 11/29    Edis Hsu MD   atorvastatin (LIPITOR) 40 MG tablet Take 1 tablet by mouth Daily. 9/28/23   Edis Hsu MD   B Complex Vitamins (VITAMIN B COMPLEX PO) Take  by mouth 2 (Two) Times a Week.    Edis Hsu MD   Lumigan 0.01 % ophthalmic drops Administer 1 drop to both eyes Every Night. 7/8/24   Edis Hsu MD   meclizine (ANTIVERT) 25 MG tablet Take 1 tablet by mouth Daily. 3/21/25   Edis Hsu MD   metoprolol succinate XL (TOPROL-XL) 25 MG 24 hr tablet Take 3 tablets by mouth 2 (Two) Times a Day. 5/29/25   Deb Montes DO   omeprazole (priLOSEC) 20 MG capsule Take 1 capsule by mouth 4 (Four) Times a Week.    Edis Hsu MD   timolol (TIMOPTIC) 0.5 % ophthalmic solution timolol maleate 0.5 % eye drops    Edis Hsu MD           Objective   Physical Exam  32-year-old female awake alert.  Generally well-developed well-nourished.  She has contusion hematoma right posterior parietal scalp pupils equal round Actilite.  Oropharynx mucous membranes moist neck supple she has bruising to posterior lateral aspect of neck and also right upper chest.  Lungs reveal some basilar rales.  Cardiovascular irregular irregular rhythm.  Abdomen is soft without mass rebound or guarding.  She has noted to have bruising to right hip.  Extremities 2+ symmetric pedal edema.  She has  bruising to left foot.  Procedures           ED Course      Results for orders placed or performed during the hospital encounter of 06/13/25   ECG 12 Lead Dyspnea    Collection Time: 06/13/25  7:16 AM   Result Value Ref Range    QT Interval 408 ms    QTC Interval 534 ms   Comprehensive Metabolic Panel    Collection Time: 06/13/25  8:12 AM    Specimen: Arm, Left; Blood   Result Value Ref Range    Glucose 155 (H) 65 - 99 mg/dL    BUN 17.9 8.0 - 23.0 mg/dL    Creatinine 1.14 (H) 0.57 - 1.00 mg/dL    Sodium 139 136 - 145 mmol/L    Potassium 5.7 (H) 3.5 - 5.2 mmol/L    Chloride 103 98 - 107 mmol/L    CO2 25.0 22.0 - 29.0 mmol/L    Calcium 9.3 8.2 - 9.6 mg/dL    Total Protein 7.0 6.0 - 8.5 g/dL    Albumin 4.1 3.5 - 5.2 g/dL    ALT (SGPT) 45 (H) 1 - 33 U/L    AST (SGOT) 42 (H) 1 - 32 U/L    Alkaline Phosphatase 102 39 - 117 U/L    Total Bilirubin 1.3 (H) 0.0 - 1.2 mg/dL    Globulin 2.9 gm/dL    A/G Ratio 1.4 g/dL    BUN/Creatinine Ratio 15.7 7.0 - 25.0    Anion Gap 11.0 5.0 - 15.0 mmol/L    eGFR 45.3 (L) >60.0 mL/min/1.73   High Sensitivity Troponin T    Collection Time: 06/13/25  8:12 AM    Specimen: Arm, Left; Blood   Result Value Ref Range    HS Troponin T 13 <14 ng/L   CBC Auto Differential    Collection Time: 06/13/25  8:12 AM    Specimen: Arm, Left; Blood   Result Value Ref Range    WBC 10.35 3.40 - 10.80 10*3/mm3    RBC 3.53 (L) 3.77 - 5.28 10*6/mm3    Hemoglobin 10.9 (L) 12.0 - 15.9 g/dL    Hematocrit 34.3 34.0 - 46.6 %    MCV 97.2 (H) 79.0 - 97.0 fL    MCH 30.9 26.6 - 33.0 pg    MCHC 31.8 31.5 - 35.7 g/dL    RDW 14.2 12.3 - 15.4 %    RDW-SD 48.8 37.0 - 54.0 fl    MPV 10.6 6.0 - 12.0 fL    Platelets 308 140 - 450 10*3/mm3    Neutrophil % 76.2 (H) 42.7 - 76.0 %    Lymphocyte % 15.0 (L) 19.6 - 45.3 %    Monocyte % 5.9 5.0 - 12.0 %    Eosinophil % 1.8 0.3 - 6.2 %    Basophil % 0.5 0.0 - 1.5 %    Immature Grans % 0.6 (H) 0.0 - 0.5 %    Neutrophils, Absolute 7.89 (H) 1.70 - 7.00 10*3/mm3    Lymphocytes, Absolute 1.55  "0.70 - 3.10 10*3/mm3    Monocytes, Absolute 0.61 0.10 - 0.90 10*3/mm3    Eosinophils, Absolute 0.19 0.00 - 0.40 10*3/mm3    Basophils, Absolute 0.05 0.00 - 0.20 10*3/mm3    Immature Grans, Absolute 0.06 (H) 0.00 - 0.05 10*3/mm3    nRBC 0.0 0.0 - 0.2 /100 WBC   BNP    Collection Time: 06/13/25  8:12 AM    Specimen: Arm, Left; Blood   Result Value Ref Range    proBNP 12,425.0 (H) 0.0 - 1,800.0 pg/mL   Gold Top - SST    Collection Time: 06/13/25  8:12 AM   Result Value Ref Range    Extra Tube Hold for add-ons.    Light Blue Top    Collection Time: 06/13/25  8:12 AM   Result Value Ref Range    Extra Tube Hold for add-ons.      XR Chest 1 View   Final Result   Impression:   Evidence for developing bibasilar infiltrates and small bilateral pleural effusions. These findings may be related to developing bibasilar pneumonia. Changes of CHF and pulmonary edema could also be considered.         Electronically Signed: Enrike Riojas MD     6/13/2025 8:10 AM EDT     Workstation ID: [id number]        Medications   sodium chloride 0.9 % flush 10 mL (has no administration in time range)   furosemide (LASIX) injection 80 mg (has no administration in time range)   nitroglycerin (NITROSTAT) ointment 1 inch (has no administration in time range)     /97   Pulse 100   Temp 97.6 °F (36.4 °C) (Oral)   Resp 22   Ht 154.9 cm (61\")   Wt 64.3 kg (141 lb 12.1 oz)   SpO2 96%   BMI 26.78 kg/m²                                                    Medical Decision Making  Problems Addressed:  Acute congestive heart failure, unspecified heart failure type: complicated acute illness or injury  Shortness of breath: complicated acute illness or injury    Amount and/or Complexity of Data Reviewed  Labs: ordered.  Radiology: ordered.  ECG/medicine tests: ordered.    Risk  Prescription drug management.    Chart review: Patient had admission end of last month for new onset atrial fibrillation type II MI mitral regurgitation chronic heart failure " with preserved ejection fraction.  Comorbidity: As per past history   Differential: Congestive heart failure, atrial fibrillation, MI,  My EKG interpretation: Atrial fibrillation with right bundle branch block rate of 103.  Compared to previous no significant change noted.  Lab: White count 10.3 with hemoglobin 10.9 platelet count 308 76 segs no bands comprehensive metabolic panel glucose 155 creatinine 1.14 potassium 5.7 with ALT 45 AST 42.  BNP elevated 12,425  My Radiology review and interpretation: Chest x-ray reveals bibasilar infiltrate with pleural effusion.  There is old fracture of proximal humerus noted.  Compared to previous bibasilar infiltrate pleural effusion is new.  Discussion/treatment: Patient IV placed.  Was given Lasix 80 mL IV was placed on Nitropaste 1 inch.  Her most recent BNP 2 weeks ago was approximately 3000.  Patient's findings were discussed with her.  She was discussed with the midlevel for the hospitalist.  Will be admitted to their service for continued care.  Patient was evaluated using appropriate PPE      Final diagnoses:   Shortness of breath   Acute congestive heart failure, unspecified heart failure type   Persistent atrial fibrillation       ED Disposition  ED Disposition       ED Disposition   Decision to Admit    Condition   --    Comment   Level of Care: Telemetry [5]   Admitting Physician: SMOOTH LARIOS [[id number]]                 No follow-up provider specified.       Medication List      No changes were made to your prescriptions during this visit.            Anton Maloney MD  06/13/25 0955

## 2025-06-14 LAB
ANION GAP SERPL CALCULATED.3IONS-SCNC: 13.6 MMOL/L (ref 5–15)
BASOPHILS # BLD AUTO: 0.05 10*3/MM3 (ref 0–0.2)
BASOPHILS NFR BLD AUTO: 0.5 % (ref 0–1.5)
BUN SERPL-MCNC: 18.3 MG/DL (ref 8–23)
BUN/CREAT SERPL: 15.3 (ref 7–25)
CALCIUM SPEC-SCNC: 9 MG/DL (ref 8.2–9.6)
CHLORIDE SERPL-SCNC: 97 MMOL/L (ref 98–107)
CO2 SERPL-SCNC: 29.4 MMOL/L (ref 22–29)
CREAT SERPL-MCNC: 1.2 MG/DL (ref 0.57–1)
DEPRECATED RDW RBC AUTO: 47.7 FL (ref 37–54)
EGFRCR SERPLBLD CKD-EPI 2021: 42.6 ML/MIN/1.73
EOSINOPHIL # BLD AUTO: 0.13 10*3/MM3 (ref 0–0.4)
EOSINOPHIL NFR BLD AUTO: 1.3 % (ref 0.3–6.2)
ERYTHROCYTE [DISTWIDTH] IN BLOOD BY AUTOMATED COUNT: 13.9 % (ref 12.3–15.4)
GLUCOSE SERPL-MCNC: 118 MG/DL (ref 65–99)
HCT VFR BLD AUTO: 35.2 % (ref 34–46.6)
HGB BLD-MCNC: 11.3 G/DL (ref 12–15.9)
IMM GRANULOCYTES # BLD AUTO: 0.07 10*3/MM3 (ref 0–0.05)
IMM GRANULOCYTES NFR BLD AUTO: 0.7 % (ref 0–0.5)
LYMPHOCYTES # BLD AUTO: 2.31 10*3/MM3 (ref 0.7–3.1)
LYMPHOCYTES NFR BLD AUTO: 22.3 % (ref 19.6–45.3)
MAGNESIUM SERPL-MCNC: 1.9 MG/DL (ref 1.7–2.3)
MCH RBC QN AUTO: 30.7 PG (ref 26.6–33)
MCHC RBC AUTO-ENTMCNC: 32.1 G/DL (ref 31.5–35.7)
MCV RBC AUTO: 95.7 FL (ref 79–97)
MONOCYTES # BLD AUTO: 0.79 10*3/MM3 (ref 0.1–0.9)
MONOCYTES NFR BLD AUTO: 7.6 % (ref 5–12)
NEUTROPHILS NFR BLD AUTO: 67.6 % (ref 42.7–76)
NEUTROPHILS NFR BLD AUTO: 7.02 10*3/MM3 (ref 1.7–7)
NRBC BLD AUTO-RTO: 0 /100 WBC (ref 0–0.2)
PLATELET # BLD AUTO: 285 10*3/MM3 (ref 140–450)
PMV BLD AUTO: 10.8 FL (ref 6–12)
POTASSIUM SERPL-SCNC: 3.7 MMOL/L (ref 3.5–5.2)
QT INTERVAL: 408 MS
QTC INTERVAL: 534 MS
RBC # BLD AUTO: 3.68 10*6/MM3 (ref 3.77–5.28)
SODIUM SERPL-SCNC: 140 MMOL/L (ref 136–145)
WBC NRBC COR # BLD AUTO: 10.37 10*3/MM3 (ref 3.4–10.8)

## 2025-06-14 PROCEDURE — G0378 HOSPITAL OBSERVATION PER HR: HCPCS

## 2025-06-14 PROCEDURE — 99215 OFFICE O/P EST HI 40 MIN: CPT | Performed by: INTERNAL MEDICINE

## 2025-06-14 PROCEDURE — 25010000002 FUROSEMIDE PER 20 MG

## 2025-06-14 RX ORDER — METOPROLOL SUCCINATE 50 MG/1
50 TABLET, EXTENDED RELEASE ORAL EVERY 12 HOURS SCHEDULED
Status: DISCONTINUED | OUTPATIENT
Start: 2025-06-14 | End: 2025-06-15 | Stop reason: HOSPADM

## 2025-06-14 RX ADMIN — FUROSEMIDE 40 MG: 10 INJECTION, SOLUTION INTRAMUSCULAR; INTRAVENOUS at 18:47

## 2025-06-14 RX ADMIN — METOPROLOL SUCCINATE 50 MG: 50 TABLET, EXTENDED RELEASE ORAL at 14:31

## 2025-06-14 RX ADMIN — PANTOPRAZOLE SODIUM 40 MG: 40 TABLET, DELAYED RELEASE ORAL at 09:15

## 2025-06-14 RX ADMIN — METOPROLOL SUCCINATE 50 MG: 50 TABLET, EXTENDED RELEASE ORAL at 21:48

## 2025-06-14 RX ADMIN — METOPROLOL SUCCINATE 75 MG: 25 TABLET, EXTENDED RELEASE ORAL at 09:15

## 2025-06-14 RX ADMIN — FUROSEMIDE 40 MG: 10 INJECTION, SOLUTION INTRAMUSCULAR; INTRAVENOUS at 09:16

## 2025-06-14 RX ADMIN — ATORVASTATIN CALCIUM 40 MG: 40 TABLET, FILM COATED ORAL at 09:15

## 2025-06-14 RX ADMIN — AMIODARONE HYDROCHLORIDE 200 MG: 200 TABLET ORAL at 09:16

## 2025-06-14 RX ADMIN — APIXABAN 2.5 MG: 2.5 TABLET, FILM COATED ORAL at 20:09

## 2025-06-14 RX ADMIN — ASPIRIN 81 MG: 81 TABLET, COATED ORAL at 09:20

## 2025-06-14 RX ADMIN — Medication 10 ML: at 09:16

## 2025-06-14 RX ADMIN — AMIODARONE HYDROCHLORIDE 200 MG: 200 TABLET ORAL at 18:47

## 2025-06-14 RX ADMIN — Medication 10 ML: at 20:09

## 2025-06-14 RX ADMIN — TIMOLOL MALEATE 1 DROP: 5 SOLUTION OPHTHALMIC at 09:20

## 2025-06-14 RX ADMIN — APIXABAN 2.5 MG: 2.5 TABLET, FILM COATED ORAL at 09:15

## 2025-06-14 NOTE — PLAN OF CARE
Continue to monitor and assess pain.  Patient is resting in bed with daughter at bedside.  Problem: Adult Inpatient Plan of Care  Goal: Plan of Care Review  Outcome: Progressing  Flowsheets (Taken 6/14/2025 0730)  Progress: improving  Plan of Care Reviewed With:   patient   child  Goal: Patient-Specific Goal (Individualized)  Outcome: Progressing  Goal: Absence of Hospital-Acquired Illness or Injury  Outcome: Progressing  Intervention: Identify and Manage Fall Risk  Flowsheets (Taken 6/14/2025 0730)  Safety Promotion/Fall Prevention:   clutter free environment maintained   assistive device/personal items within reach   safety round/check completed   fall prevention program maintained   gait belt   nonskid shoes/slippers when out of bed  Intervention: Prevent Skin Injury  Flowsheets  Taken 6/14/2025 0730 by Lucille Donaldson RN  Skin Protection: incontinence pads utilized  Taken 6/13/2025 2100 by Tyra Mireles PCT  Body Position: position changed independently  Intervention: Prevent and Manage VTE (Venous Thromboembolism) Risk  Flowsheets (Taken 6/14/2025 0730)  VTE Prevention/Management: (See MAR)   SCDs (sequential compression devices) off   other (see comments)  Intervention: Prevent Infection  Flowsheets (Taken 6/14/2025 0730)  Infection Prevention:   hand hygiene promoted   personal protective equipment utilized   rest/sleep promoted   single patient room provided  Goal: Optimal Comfort and Wellbeing  Outcome: Progressing  Intervention: Monitor Pain and Promote Comfort  Flowsheets (Taken 6/14/2025 0730)  Pain Management Interventions:   relaxation techniques promoted   quiet environment facilitated  Intervention: Provide Person-Centered Care  Flowsheets (Taken 6/13/2025 2015 by Chad Mancini, RN)  Trust Relationship/Rapport:   care explained   questions answered   questions encouraged   reassurance provided  Goal: Readiness for Transition of Care  Outcome: Progressing  Intervention: Mutually Develop  Transition Plan  Flowsheets  Taken 6/13/2025 1241 by Eliazar Anglin, RN  Equipment Currently Used at Home: none  Taken 6/13/2025 1101 by Lucrecia Silva, RN  Equipment Needed After Discharge: none  Anticipated Changes Related to Illness: none  Transportation Anticipated: family or friend will provide  Transportation Concerns: none  Concerns to be Addressed: denies needs/concerns at this time  Readmission Within the Last 30 Days: no previous admission in last 30 days  Patient/Family Anticipated Services at Transition: none  Patient/Family Anticipates Transition to: home with family     Problem: Heart Failure  Goal: Optimal Coping  Outcome: Progressing  Intervention: Support Psychosocial Response  Flowsheets (Taken 6/13/2025 2015 by Chad Mancini, RN)  Supportive Measures: active listening utilized  Family/Support System Care: support provided  Goal: Optimal Cardiac Output and Blood Flow  Outcome: Progressing  Intervention: Optimize Cardiac Output  Flowsheets (Taken 6/14/2025 0730)  Environmental Support: calm environment promoted  Goal: Stable Heart Rate and Rhythm  Outcome: Progressing  Goal: Fluid and Electrolyte Balance  Outcome: Progressing  Goal: Optimal Functional Ability  Outcome: Progressing  Intervention: Optimize Functional Ability  Flowsheets (Taken 6/13/2025 1226 by Eliazar Anglin, RN)  Activity Management: activity encouraged  Goal: Improved Oral Intake  Outcome: Progressing  Goal: Effective Oxygenation and Ventilation  Outcome: Progressing  Intervention: Promote Airway Secretion Clearance  Flowsheets (Taken 6/13/2025 1226 by Eliazar Anglin, RN)  Activity Management: activity encouraged  Intervention: Optimize Oxygenation and Ventilation  Flowsheets (Taken 6/14/2025 0615 by Chad Mancini, RN)  Head of Bed (HOB) Positioning: HOB at 30-45 degrees  Airway/Ventilation Management:   airway patency maintained   calming measures promoted  Goal: Effective Breathing Pattern During  Sleep  Outcome: Progressing  Intervention: Monitor and Manage Obstructive Sleep Apnea  Flowsheets (Taken 6/14/2025 0730)  Medication Review/Management: medications reviewed     Problem: Fall Injury Risk  Goal: Absence of Fall and Fall-Related Injury  Outcome: Progressing  Intervention: Identify and Manage Contributors  Flowsheets (Taken 6/14/2025 0730)  Medication Review/Management: medications reviewed  Intervention: Promote Injury-Free Environment  Flowsheets (Taken 6/14/2025 0730)  Safety Promotion/Fall Prevention:   clutter free environment maintained   assistive device/personal items within reach   safety round/check completed   fall prevention program maintained   gait belt   nonskid shoes/slippers when out of bed     Problem: Comorbidity Management  Goal: Maintenance of Heart Failure Symptom Control  Outcome: Progressing  Intervention: Maintain Heart Failure Management  Flowsheets (Taken 6/14/2025 0730)  Medication Review/Management: medications reviewed  Goal: Blood Pressure in Desired Range  Outcome: Progressing  Intervention: Maintain Blood Pressure Management  Flowsheets (Taken 6/14/2025 0730)  Medication Review/Management: medications reviewed   Goal Outcome Evaluation:  Plan of Care Reviewed With: patient, child        Progress: improving

## 2025-06-14 NOTE — PROGRESS NOTES
Cardiology [facility]      Patient Care Team:  Ranulfo Mims MD as PCP - General (Internal Medicine)  Deep Aceves MD as Consulting Physician (Cardiology)        Cardiology assessment and plan      Shortness of breath  Respiratory failure  Acute on chronic diastolic heart failure exacerbation  Atrial fibrillation with rapid ventricular response  Moderate to severe mitral regurgitation  Patient is a 89-year-old female with history of CAD status post remote CABG in 2009, SVT status post cardioversion, hypertension hyperlipidemia, who was initially admitted to the hospital with shortness of breath.  She was recently diagnosed with atrial fibrillation in May 2025, placed on amiodarone and Eliquis.    On exam, she has signs of mild volume overload.  For now, I agree with IV diuresis, and consider EVERARDO/cardioversion once patient is approaching euvolemia.  She is on 40 mg furosemide IV twice daily and reports improvement of symptoms since yesterday.  Close monitoring of intakes and outputs, renal function and daily weights.  Patient and family prefers conservative management at this time  cardiac catheterization with patent grafts  Patient and probably prefers conservative management at this time  Tmax is 97.5 pulse is 98-1 01 respirations are 18 blood pressure is 124/66 sats are 93%  Sodium is 140 potassium is 3.7 creatinine is 1.2 hemoglobin is 11.3  Current medications include amiodarone 200 mg p.o. once a day patient is on Lipitor 40 mg p.o. once a day patient is on Lasix 40 mg IV twice daily  Patient is on Toprol-XL 75 mg p.o. once a day patient is on anticoagulation therapy with Eliquis 2.5 mg p.o. twice daily  Switch patient to Toprol-XL 50 mg p.o. twice daily for better control of heart rate  Continue current medical therapy continue close monitoring and follow-up  Further recommendation based on patient course                        Chief Complaint: Shortness of breath    Subjective no new complaints    Interval  "History: No significant change in the overall status    History taken from: patient    Review of Systems:  Review of Systems   Constitutional: Negative for chills, decreased appetite and malaise/fatigue.   HENT:  Negative for congestion and nosebleeds.    Eyes:  Negative for blurred vision and double vision.   Cardiovascular:  Positive for dyspnea on exertion and irregular heartbeat. Negative for chest pain, leg swelling, near-syncope, orthopnea, palpitations and paroxysmal nocturnal dyspnea.   Respiratory:  Positive for shortness of breath. Negative for cough.    Hematologic/Lymphatic: Negative for adenopathy. Does not bruise/bleed easily.   Skin:  Negative for color change and rash.   Musculoskeletal:  Negative for back pain and joint pain.   Gastrointestinal:  Negative for bloating, abdominal pain, hematemesis and hematochezia.   Genitourinary:  Negative for flank pain and hematuria.   Neurological:  Negative for dizziness and focal weakness.   Psychiatric/Behavioral:  Negative for altered mental status. The patient does not have insomnia.        Objective    Vital Signs  Visit Vitals  /66   Pulse 101   Temp 97.5 °F (36.4 °C) (Axillary)   Resp 18   Ht 154.9 cm (61\")   Wt 63 kg (138 lb 14.2 oz)   SpO2 93%   BMI 26.24 kg/m²     Oxygen Therapy  SpO2: 93 %  Pulse Oximetry Type: Continuous  Device (Oxygen Therapy): room air  Flow (L/min) (Oxygen Therapy): 1  Flowsheet Rows      Flowsheet Row First Filed Value   Admission Height 154.9 cm (61\") Documented at 06/13/2025 0711   Admission Weight 64.3 kg (141 lb 12.1 oz) Documented at 06/13/2025 0711          Intake & Output (last 3 days)         06/11 0701  06/12 0700 06/12 0701  06/13 0700 06/13 0701  06/14 0700 06/14 0701  06/15 0700    P.O.   240 360    I.V. (mL/kg)    0 (0)    Total Intake(mL/kg)   240 (3.8) 360 (5.7)    Urine (mL/kg/hr)   2300 500 (1.3)    Stool   0     Total Output   2300 500    Net   -2060 -140            Urine Unmeasured Occurrence   2 x     " Stool Unmeasured Occurrence   0 x           Lines, Drains & Airways       Active LDAs       Name Placement date Placement time Site Days    Peripheral IV 06/13/25 0809 20 G Left Antecubital 06/13/25  0809  Antecubital  1    External Urinary Catheter 06/13/25  1226  --  1                    Physical Exam:  Constitutional:       Appearance: Well-developed.   Eyes:      Conjunctiva/sclera: Conjunctivae normal.      Pupils: Pupils are equal, round, and reactive to light.   HENT:      Head: Normocephalic and atraumatic.   Neck:      Thyroid: No thyromegaly.   Pulmonary:      Effort: Pulmonary effort is normal.      Breath sounds: Examination of the right-lower field reveals decreased breath sounds. Examination of the left-lower field reveals decreased breath sounds. Decreased breath sounds present.   Cardiovascular:      Abnormal PMI. Tachycardia present. Irregularly irregular rhythm.      Murmurs: There is a grade 3/6 holosystolic murmur.      S3 and S4 Gallop.    Pulses:     Intact distal pulses.   Edema:     Peripheral edema absent.   Abdominal:      General: Bowel sounds are normal.      Palpations: Abdomen is soft.   Musculoskeletal:      Cervical back: Normal range of motion and neck supple. Skin:     General: Skin is warm.   Neurological:      Mental Status: Alert and oriented to person, place, and time.         Results Review:     I reviewed the patient's new clinical results.    Lab Results (last 24 hours)       Procedure Component Value Units Date/Time    Basic Metabolic Panel [[id number]]  (Abnormal) Collected: 06/13/25 2347    Specimen: Blood from Arm, Right Updated: 06/14/25 0054     Glucose 118 mg/dL      BUN 18.3 mg/dL      Creatinine 1.20 mg/dL      Sodium 140 mmol/L      Potassium 3.7 mmol/L      Chloride 97 mmol/L      CO2 29.4 mmol/L      Calcium 9.0 mg/dL      BUN/Creatinine Ratio 15.3     Anion Gap 13.6 mmol/L      eGFR 42.6 mL/min/1.73     Narrative:      GFR Categories in Chronic Kidney Disease  (CKD)              GFR Category          GFR (mL/min/1.73)    Interpretation  G1                    90 or greater        Normal or high (1)  G2                    60-89                Mild decrease (1)  G3a                   45-59                Mild to moderate decrease  G3b                   30-44                Moderate to severe decrease  G4                    15-29                Severe decrease  G5                    14 or less           Kidney failure    (1)In the absence of evidence of kidney disease, neither GFR category G1 or G2 fulfill the criteria for CKD.    eGFR calculation 2021 CKD-EPI creatinine equation, which does not include race as a factor    Magnesium [[id number]]  (Normal) Collected: 06/13/25 2347    Specimen: Blood from Arm, Right Updated: 06/14/25 0054     Magnesium 1.9 mg/dL     CBC & Differential [[id number]]  (Abnormal) Collected: 06/13/25 2347    Specimen: Blood from Arm, Right Updated: 06/14/25 0035    Narrative:      The following orders were created for panel order CBC & Differential.  Procedure                               Abnormality         Status                     ---------                               -----------         ------                     CBC Auto Differential[[id number]]        Abnormal            Final result                 Please view results for these tests on the individual orders.    CBC Auto Differential [[id number]]  (Abnormal) Collected: 06/13/25 2347    Specimen: Blood from Arm, Right Updated: 06/14/25 0035     WBC 10.37 10*3/mm3      RBC 3.68 10*6/mm3      Hemoglobin 11.3 g/dL      Hematocrit 35.2 %      MCV 95.7 fL      MCH 30.7 pg      MCHC 32.1 g/dL      RDW 13.9 %      RDW-SD 47.7 fl      MPV 10.8 fL      Platelets 285 10*3/mm3      Neutrophil % 67.6 %      Lymphocyte % 22.3 %      Monocyte % 7.6 %      Eosinophil % 1.3 %      Basophil % 0.5 %      Immature Grans % 0.7 %      Neutrophils, Absolute 7.02 10*3/mm3      Lymphocytes, Absolute 2.31 10*3/mm3       Monocytes, Absolute 0.79 10*3/mm3      Eosinophils, Absolute 0.13 10*3/mm3      Basophils, Absolute 0.05 10*3/mm3      Immature Grans, Absolute 0.07 10*3/mm3      nRBC 0.0 /100 WBC     Basic Metabolic Panel [[id number]]  (Abnormal) Collected: 06/13/25 1816    Specimen: Blood from Arm, Left Updated: 06/13/25 1849     Glucose 106 mg/dL      BUN 17.5 mg/dL      Creatinine 0.96 mg/dL      Sodium 141 mmol/L      Potassium 3.4 mmol/L      Comment: Result checked          Chloride 102 mmol/L      CO2 25.1 mmol/L      Calcium 8.2 mg/dL      BUN/Creatinine Ratio 18.2     Anion Gap 13.9 mmol/L      eGFR 55.6 mL/min/1.73     Narrative:      GFR Categories in Chronic Kidney Disease (CKD)              GFR Category          GFR (mL/min/1.73)    Interpretation  G1                    90 or greater        Normal or high (1)  G2                    60-89                Mild decrease (1)  G3a                   45-59                Mild to moderate decrease  G3b                   30-44                Moderate to severe decrease  G4                    15-29                Severe decrease  G5                    14 or less           Kidney failure    (1)In the absence of evidence of kidney disease, neither GFR category G1 or G2 fulfill the criteria for CKD.    eGFR calculation 2021 CKD-EPI creatinine equation, which does not include race as a factor    Magnesium [[id number]]  (Normal) Collected: 06/13/25 1816    Specimen: Blood from Arm, Left Updated: 06/13/25 1849     Magnesium 1.8 mg/dL           Results for orders placed during the hospital encounter of 05/25/25    Adult Transthoracic Echo Complete w/ Color, Spectral and Contrast if necessary per protocol    Interpretation Summary    Left ventricular ejection fraction appears to be 56 - 60%.    Left ventricular diastolic function is consistent with (grade III w/high LAP) reversible restrictive pattern.    The left atrial cavity is moderately dilated.    Moderate to severe mitral valve  regurgitation is present.    Moderate tricuspid valve regurgitation is present.    Estimated right ventricular systolic pressure from tricuspid regurgitation is moderately elevated (45-55 mmHg).        Medication Review:   I have reviewed the patient's current medication list  Scheduled Meds:amiodarone, 200 mg, Oral, BID With Meals  apixaban, 2.5 mg, Oral, Q12H  aspirin, 81 mg, Oral, Daily  atorvastatin, 40 mg, Oral, Daily  furosemide, 40 mg, Intravenous, BID Diuretics  metoprolol succinate XL, 75 mg, Oral, Q12H  pantoprazole, 40 mg, Oral, Daily  sodium chloride, 10 mL, Intravenous, Q12H  timolol, 1 drop, Both Eyes, Daily      Continuous Infusions:   PRN Meds:.  acetaminophen **OR** acetaminophen **OR** acetaminophen    senna-docusate sodium **AND** polyethylene glycol **AND** bisacodyl **AND** bisacodyl    Calcium Replacement - Follow Nurse / BPA Driven Protocol    Magnesium Cardiology Dose Replacement - Follow Nurse / BPA Driven Protocol    melatonin    nitroglycerin    ondansetron ODT **OR** ondansetron    Phosphorus Replacement - Follow Nurse / BPA Driven Protocol    Potassium Replacement - Follow Nurse / BPA Driven Protocol    [COMPLETED] Insert Peripheral IV **AND** sodium chloride    sodium chloride    sodium chloride    ECG/EMG Results (last 24 hours)       Procedure Component Value Units Date/Time    Telemetry Scan [[id number]] Resulted: 06/14/25 1051     Updated: 06/14/25 1120    ECG 12 Lead Dyspnea [[id number]] Collected: 06/13/25 0716     Updated: 06/14/25 1247     QT Interval 408 ms      QTC Interval 534 ms     Narrative:      HEART HYRE=944  bpm  RR Egsozhwy=333  ms  VT Interval=  ms  P Horizontal Axis=  deg  P Front Axis=  deg  QRSD Gaswwkva=805  ms  QT Dixwohpy=528  ms  WOpM=493  ms  QRS Axis=48  deg  T Wave Axis=-39  deg  - ABNORMAL ECG -  Atrial fibrillation  Right bundle branch block  Prolonged QT interval  When compared with ECG of 29-May-2025 05:39:49,  Significant change in rhythm: previously  atrial fibrillation  Electronically Signed By: Anton Maloney (SELVIN) 2025-06-14 12:47:00  Date and Time of Study:2025-06-13 07:16:49            Imaging Results (Last 24 Hours)       ** No results found for the last 24 hours. **          Results for orders placed during the hospital encounter of 05/25/25    Cardiac Catheterization/Vascular Study    Conclusion  OPERATORS  Win Garzon M.D. (Attending Cardiologist)      PROCEDURES PERFORMED  Ultrasound guided Vascular access  Left Heart Catheterization  Coronary Angiogram  Bypass angiogram  Moderate sedation    INDICATIONS FOR PROCEDURE  89 years old woman with multiple cardiovascular risk factors, known coronary disease with previous CABG presented with chest pain, A-fib RVR, significantly elevated troponin increasing up to 600.  After discussing the risk and benefits of the procedure she was offered diagnostic coronary angiography.    PROCEDURE IN DETAIL  Informed consent was obtained from the patient after explaining the risks, benefits, and alternative options of the procedure. After obtaining informed consent, the patient was brought to the cath lab and was prepped in a sterile fashion. Lidocaine 2% was used for local anesthesia into the right femoral access site. The right femoral artery was accessed with a micropuncture needle via modified Seldinger technique under ultrasound guidance. A 6F was inserted successfully. Afterwards, 6F JR4 and JL4 diagnostic catheters were advanced over a wire into the ascending aorta and were used to engage the ostia of the left main and RCA respectively. JR4 used to cross the AV and obtain LV pressures and gradient across the AV measured via pullback technique.  JR4 catheter was also used to selectively engage the vein graft supplying the RCA and left circumflex.  JR4 was used to selectively engage left subclavian artery and this catheter was exchanged for IM catheter.  Selective IM angiography was performed.  Images of the right  and left coronary systems were obtained. All the catheters were exchanged over a wire and subsequently removed.  The patient tolerated the procedure well without any complications. The pictures were reviewed at the end of the procedure.  Sheath was left in place for removal and manual pressure    HEMODYNAMICS    LV: 118/14, 21 mmHg  AO: 113/53, 80 mmHg  Gradient: No significant gradient across the aortic valve during pullback of JR4 catheter.  LV gram was not performed due to recently available echocardiogram and chronic kidney disease    FINDINGS  Coronary Angiogram    Right dominant circulation    Left main: There is no left main.  Separate ostia for left circumflex and LAD.    Left Anterior Descending Artery: LAD is patent in the proximal segment and provides a medium caliber tortuous diagonal vessel.  Mid LAD is 100% occluded    Left Circumflex: Left circumflex artery is 100% occluded in the proximal segment    Right Coronary Artery: The RCA is 100% occluded in the proximal segment    Bypass angiography  Vein graft supplying the RCA is widely patent at the origin, body and anastomosis  Vein graft supplying the left circumflex artery is widely patent at the origin, body and anastomosis  LIMA to LAD is widely patent at the origin, body and anastomosis    ESTIMATED BLOOD LOSS:  10 ml    COMPLICATIONS:  None    PROCEDURE DATA:  Contrast Used: 50 cc  Sedation Time: 30 minutes    IMPRESSIONS  Severe three-vessel native obstructive coronary disease  Patent LIMA to LAD, vein graft to LCx and vein graft to RCA  Elevated LVEDP    RECOMMENDATIONS  - Diuresis, management of A-fib RVR  -Medical management of coronary artery disease  -Workup for transcatheter [id number] mitral valve repair    Electronically signed by Win Garzon MD, 05/26/25, 2:40 PM EDT.     Results for orders placed during the hospital encounter of 05/25/25    Adult Transthoracic Echo Complete w/ Color, Spectral and Contrast if necessary per  protocol    Interpretation Summary    Left ventricular ejection fraction appears to be 56 - 60%.    Left ventricular diastolic function is consistent with (grade III w/high LAP) reversible restrictive pattern.    The left atrial cavity is moderately dilated.    Moderate to severe mitral valve regurgitation is present.    Moderate tricuspid valve regurgitation is present.    Estimated right ventricular systolic pressure from tricuspid regurgitation is moderately elevated (45-55 mmHg).     Lab Results   Component Value Date    GLUCOSE 118 (H) 06/13/2025    BUN 18.3 06/13/2025    CREATININE 1.20 (H) 06/13/2025    EGFR 42.6 (L) 06/13/2025    BCR 15.3 06/13/2025    K 3.7 06/13/2025    CO2 29.4 (H) 06/13/2025    CALCIUM 9.0 06/13/2025    ALBUMIN 4.1 06/13/2025    BILITOT 1.3 (H) 06/13/2025    AST 42 (H) 06/13/2025    ALT 45 (H) 06/13/2025      Lab Results   Component Value Date    CHOL 139 05/25/2025    TRIG 61 05/25/2025    HDL 45 05/25/2025    LDL 81 05/25/2025      Lab Results   Component Value Date    TROPONINT 13 06/13/2025        Assessment & Plan       Acute CHF        Millie Garcia MD  06/14/25  13:13 EDT

## 2025-06-14 NOTE — PLAN OF CARE
Problem: Heart Failure  Goal: Effective Oxygenation and Ventilation  Intervention: Optimize Oxygenation and Ventilation  Flowsheets (Taken 6/14/2025 0615)  Head of Bed (HOB) Positioning: HOB at 30-45 degrees  Airway/Ventilation Management:   airway patency maintained   calming measures promoted

## 2025-06-14 NOTE — PROGRESS NOTES
[facility] MEDICINE SERVICE  DAILY PROGRESS NOTE    NAME: Amanda Brown  : 6/10/1933  MRN: [id number]      LOS: 0 days     PROVIDER OF SERVICE: García Gao MD    Chief Complaint: Acute CHF    Subjective:     Interval History: Patient states that she is breathing overall better today.  She diuresed quite well overnight.        Review of Systems:   Review of Systems    Objective:     Vital Signs  Temp:  [97.5 °F (36.4 °C)-98.6 °F (37 °C)] 97.5 °F (36.4 °C)  Heart Rate:  [] 98  Resp:  [16-18] 18  BP: (121-161)/() 127/72  Flow (L/min) (Oxygen Therapy):  [1] 1   Body mass index is 26.24 kg/m².    Physical Exam  Physical Exam  General Appearance:  Alert, cooperative, no distress, appears stated age  Head:  Normocephalic, without obvious abnormality, atraumatic  Eyes:  PERRL, conjunctiva/corneas clear, EOM's intact, fundi benign, both eyes  Ears:  Normal TM's and external ear canals, both ears  Nose: Nares normal, septum midline, mucosa normal, no drainage or sinus tenderness  Throat: Lips, mucosa, and tongue normal; teeth and gums normal  Neck: Supple, symmetrical, trachea midline, no adenopathy, thyroid: not enlarged, symmetric, no tenderness/mass/nodules, no carotid bruit or JVD  Lungs:   Bibasilar rales, respirations unlabored  Heart:  Regular rate and rhythm, S1, S2 normal, no murmur, rub or gallop  Abdomen:  Soft, non-tender, bowel sounds active all four quadrants,  no masses, no organomegaly  Extremities: Extremities normal, atraumatic, no cyanosis or edema  Pulses: 2+ and symmetric  Skin: Skin color, texture, turgor normal, no rashes or lesions  Neurologic: Normal         Diagnostic Data    Results from last 7 days   Lab Units 25  2347 25  1816 25  0812   WBC 10*3/mm3 10.37  --  10.35   HEMOGLOBIN g/dL 11.3*  --  10.9*   HEMATOCRIT % 35.2  --  34.3   PLATELETS 10*3/mm3 285  --  308   GLUCOSE mg/dL 118*   < > 155*   CREATININE mg/dL 1.20*   < > 1.14*   BUN mg/dL 18.3    < > 17.9   SODIUM mmol/L 140   < > 139   POTASSIUM mmol/L 3.7   < > 5.7*   AST (SGOT) U/L  --   --  42*   ALT (SGPT) U/L  --   --  45*   ALK PHOS U/L  --   --  102   BILIRUBIN mg/dL  --   --  1.3*   ANION GAP mmol/L 13.6   < > 11.0    < > = values in this interval not displayed.       XR Chest 1 View  Result Date: 6/13/2025  Impression: Evidence for developing bibasilar infiltrates and small bilateral pleural effusions. These findings may be related to developing bibasilar pneumonia. Changes of CHF and pulmonary edema could also be considered. Electronically Signed: Enrike Riojas MD  6/13/2025 8:10 AM EDT  Workstation ID: [id number]        I reviewed the patient's new clinical results.    Assessment/Plan:     Active and Resolved Problems  Active Hospital Problems    Diagnosis  POA    **Acute CHF [I50.9]  Yes      Resolved Hospital Problems   No resolved problems to display.       Acute on chronic diastolic heart failure  - Patient is on IV Lasix and diuresing well  - Continue to monitor I's/O and daily weights  - Uncontrolled A-fib likely triggered her decompensated heart failure  - She will be discharged on oral diuretics once she is medically stable; she was previously discontinued off of her diuretics from her previous admission  Cardiology consult appreciated    A-fib with RVR  - Patient does not have adequate rate control currently with her medication regimen  - Will continue metoprolol but amiodarone dose has been increased per cardiology  - If she does not have adequate rate control, she would benefit from EVERARDO with cardioversion, however she is hesitant to proceed with this if necessary  -She may also benefit from MitraClip but she is also hesitant to proceed with any procedure    CAD  - Continue aspirin, statin    Hyperkalemia  - Likely hemolysis  - Currently within normal limits    Hypertension  - Resume home antihypertensives  - Patient would benefit from initiation of ACE inhibitor/ARB given her chronic  heart failure although I will defer to cardiology and we will also need to monitor her renal function during diuresis prior to initiation of ACE inhibitor/ARB        VTE Prophylaxis:  Pharmacologic & mechanical VTE prophylaxis orders are present.             Disposition Planning:     Barriers to Discharge: Improvement in heart rate control, volume status  Anticipated Date of Discharge: 6/16  Place of Discharge: Home      Time: 45 minutes     Code Status and Medical Interventions: CPR (Attempt to Resuscitate); Full Support   Ordered at: 06/13/25 0914     Code Status (Patient has no pulse and is not breathing):    CPR (Attempt to Resuscitate)     Medical Interventions (Patient has pulse or is breathing):    Full Support       Signature: Electronically signed by García Gao MD, 06/14/25, 11:00 EDT.  [facility] Hospitalist Team

## 2025-06-15 ENCOUNTER — READMISSION MANAGEMENT (OUTPATIENT)
Dept: CALL CENTER | Facility: HOSPITAL | Age: OVER 89
End: 2025-06-15
Payer: MEDICARE

## 2025-06-15 VITALS
HEART RATE: 83 BPM | WEIGHT: 143.3 LBS | SYSTOLIC BLOOD PRESSURE: 146 MMHG | TEMPERATURE: 97.5 F | OXYGEN SATURATION: 94 % | HEIGHT: 61 IN | DIASTOLIC BLOOD PRESSURE: 75 MMHG | BODY MASS INDEX: 27.06 KG/M2 | RESPIRATION RATE: 14 BRPM

## 2025-06-15 PROBLEM — I50.9 ACUTE EXACERBATION OF CHF (CONGESTIVE HEART FAILURE): Status: ACTIVE | Noted: 2025-06-15

## 2025-06-15 LAB
ANION GAP SERPL CALCULATED.3IONS-SCNC: 13.2 MMOL/L (ref 5–15)
BASOPHILS # BLD AUTO: 0.04 10*3/MM3 (ref 0–0.2)
BASOPHILS NFR BLD AUTO: 0.5 % (ref 0–1.5)
BUN SERPL-MCNC: 23.8 MG/DL (ref 8–23)
BUN/CREAT SERPL: 17.6 (ref 7–25)
CALCIUM SPEC-SCNC: 8.8 MG/DL (ref 8.2–9.6)
CHLORIDE SERPL-SCNC: 97 MMOL/L (ref 98–107)
CO2 SERPL-SCNC: 29.8 MMOL/L (ref 22–29)
CREAT SERPL-MCNC: 1.35 MG/DL (ref 0.57–1)
DEPRECATED RDW RBC AUTO: 46.5 FL (ref 37–54)
EGFRCR SERPLBLD CKD-EPI 2021: 36.9 ML/MIN/1.73
EOSINOPHIL # BLD AUTO: 0.33 10*3/MM3 (ref 0–0.4)
EOSINOPHIL NFR BLD AUTO: 3.9 % (ref 0.3–6.2)
ERYTHROCYTE [DISTWIDTH] IN BLOOD BY AUTOMATED COUNT: 14.1 % (ref 12.3–15.4)
GLUCOSE SERPL-MCNC: 99 MG/DL (ref 65–99)
HCT VFR BLD AUTO: 32.1 % (ref 34–46.6)
HGB BLD-MCNC: 10.6 G/DL (ref 12–15.9)
IMM GRANULOCYTES # BLD AUTO: 0.04 10*3/MM3 (ref 0–0.05)
IMM GRANULOCYTES NFR BLD AUTO: 0.5 % (ref 0–0.5)
LYMPHOCYTES # BLD AUTO: 2.02 10*3/MM3 (ref 0.7–3.1)
LYMPHOCYTES NFR BLD AUTO: 23.7 % (ref 19.6–45.3)
MAGNESIUM SERPL-MCNC: 2 MG/DL (ref 1.7–2.3)
MCH RBC QN AUTO: 30.9 PG (ref 26.6–33)
MCHC RBC AUTO-ENTMCNC: 33 G/DL (ref 31.5–35.7)
MCV RBC AUTO: 93.6 FL (ref 79–97)
MONOCYTES # BLD AUTO: 0.81 10*3/MM3 (ref 0.1–0.9)
MONOCYTES NFR BLD AUTO: 9.5 % (ref 5–12)
NEUTROPHILS NFR BLD AUTO: 5.27 10*3/MM3 (ref 1.7–7)
NEUTROPHILS NFR BLD AUTO: 61.9 % (ref 42.7–76)
NRBC BLD AUTO-RTO: 0 /100 WBC (ref 0–0.2)
PLATELET # BLD AUTO: 271 10*3/MM3 (ref 140–450)
PMV BLD AUTO: 10.4 FL (ref 6–12)
POTASSIUM SERPL-SCNC: 3.3 MMOL/L (ref 3.5–5.2)
RBC # BLD AUTO: 3.43 10*6/MM3 (ref 3.77–5.28)
SODIUM SERPL-SCNC: 140 MMOL/L (ref 136–145)
WBC NRBC COR # BLD AUTO: 8.51 10*3/MM3 (ref 3.4–10.8)

## 2025-06-15 PROCEDURE — 25010000002 FUROSEMIDE PER 20 MG: Performed by: INTERNAL MEDICINE

## 2025-06-15 PROCEDURE — 83735 ASSAY OF MAGNESIUM: CPT

## 2025-06-15 PROCEDURE — 80048 BASIC METABOLIC PNL TOTAL CA: CPT

## 2025-06-15 PROCEDURE — 85025 COMPLETE CBC W/AUTO DIFF WBC: CPT

## 2025-06-15 PROCEDURE — 99232 SBSQ HOSP IP/OBS MODERATE 35: CPT | Performed by: INTERNAL MEDICINE

## 2025-06-15 RX ORDER — MULTIVIT-MIN/IRON/FOLIC ACID/K 18-600-40
2000 CAPSULE ORAL DAILY
Qty: 30 CAPSULE | Refills: 0 | Status: SHIPPED | OUTPATIENT
Start: 2025-06-15

## 2025-06-15 RX ORDER — POTASSIUM CHLORIDE 1500 MG/1
20 TABLET, EXTENDED RELEASE ORAL ONCE
Status: COMPLETED | OUTPATIENT
Start: 2025-06-15 | End: 2025-06-15

## 2025-06-15 RX ORDER — NITROGLYCERIN 0.4 MG/1
0.4 TABLET SUBLINGUAL
Qty: 30 TABLET | Refills: 12 | Status: SHIPPED | OUTPATIENT
Start: 2025-06-15

## 2025-06-15 RX ORDER — POTASSIUM CHLORIDE 750 MG/1
10 TABLET, EXTENDED RELEASE ORAL 2 TIMES DAILY
Qty: 60 TABLET | Refills: 0 | Status: SHIPPED | OUTPATIENT
Start: 2025-06-15 | End: 2025-06-30 | Stop reason: SDUPTHER

## 2025-06-15 RX ORDER — FUROSEMIDE 20 MG/1
20 TABLET ORAL 2 TIMES DAILY
Qty: 60 TABLET | Refills: 0 | Status: SHIPPED | OUTPATIENT
Start: 2025-06-15 | End: 2025-06-30 | Stop reason: SDUPTHER

## 2025-06-15 RX ORDER — FUROSEMIDE 10 MG/ML
20 INJECTION INTRAMUSCULAR; INTRAVENOUS
Status: DISCONTINUED | OUTPATIENT
Start: 2025-06-15 | End: 2025-06-15 | Stop reason: HOSPADM

## 2025-06-15 RX ORDER — METOPROLOL SUCCINATE 50 MG/1
50 TABLET, EXTENDED RELEASE ORAL EVERY 12 HOURS SCHEDULED
Qty: 60 TABLET | Refills: 6 | Status: SHIPPED | OUTPATIENT
Start: 2025-06-15 | End: 2025-06-30 | Stop reason: SDUPTHER

## 2025-06-15 RX ADMIN — METOPROLOL SUCCINATE 50 MG: 50 TABLET, EXTENDED RELEASE ORAL at 09:12

## 2025-06-15 RX ADMIN — TIMOLOL MALEATE 1 DROP: 5 SOLUTION OPHTHALMIC at 09:16

## 2025-06-15 RX ADMIN — FUROSEMIDE 20 MG: 10 INJECTION, SOLUTION INTRAMUSCULAR; INTRAVENOUS at 09:16

## 2025-06-15 RX ADMIN — AMIODARONE HYDROCHLORIDE 200 MG: 200 TABLET ORAL at 09:12

## 2025-06-15 RX ADMIN — PANTOPRAZOLE SODIUM 40 MG: 40 TABLET, DELAYED RELEASE ORAL at 09:14

## 2025-06-15 RX ADMIN — APIXABAN 2.5 MG: 2.5 TABLET, FILM COATED ORAL at 09:14

## 2025-06-15 RX ADMIN — ASPIRIN 81 MG: 81 TABLET, COATED ORAL at 09:31

## 2025-06-15 RX ADMIN — EMPAGLIFLOZIN 10 MG: 10 TABLET, FILM COATED ORAL at 11:15

## 2025-06-15 RX ADMIN — POTASSIUM CHLORIDE 20 MEQ: 1500 TABLET, EXTENDED RELEASE ORAL at 09:14

## 2025-06-15 RX ADMIN — ATORVASTATIN CALCIUM 40 MG: 40 TABLET, FILM COATED ORAL at 09:11

## 2025-06-15 RX ADMIN — Medication 10 ML: at 09:16

## 2025-06-15 RX ADMIN — Medication 10 ML: at 09:17

## 2025-06-15 NOTE — DISCHARGE SUMMARY
[facility] Medicine Services  Discharge Summary    Date of Service: 6/15/2025  Patient Name: Amanda Brown  : 6/10/1933  MRN: [id number]    Date of Admission: 2025  Discharge Diagnosis: Acute CHF  Date of Discharge: 6/15/2025  Primary Care Physician: Ranulfo Mims MD      Presenting Problem:   Shortness of breath [R06.02]  Persistent atrial fibrillation [I48.19]  Acute CHF [I50.9]  Acute congestive heart failure, unspecified heart failure type [I50.9]  Acute exacerbation of CHF (congestive heart failure) [I50.9]    Active and Resolved Hospital Problems:  Active Hospital Problems    Diagnosis POA    **Acute CHF [I50.9] Yes    Acute exacerbation of CHF (congestive heart failure) [I50.9] Yes      Resolved Hospital Problems   No resolved problems to display.       Acute on chronic diastolic heart failure  - Patient is on IV Lasix and diuresing well  - Continue to monitor I's/O and daily weights  - Uncontrolled A-fib likely triggered her decompensated heart failure  - She will be discharged on oral diuretics once she is medically stable; she was previously discontinued off of her diuretics from her previous admission  Cardiology consult appreciated     A-fib with RVR  - Patient does not have adequate rate control currently with her medication regimen  - Will continue metoprolol but amiodarone dose has been increased per cardiology  - If she does not have adequate rate control, she would benefit from EVERARDO with cardioversion, however she is hesitant to proceed with this if necessary  -She may also benefit from MitraClip but she is also hesitant to proceed with any procedure     CAD  - Continue aspirin, statin     Hyperkalemia  - Likely hemolysis  - Currently within normal limits     Hypertension  - Resume home antihypertensives  - Patient would benefit from initiation of ACE inhibitor/ARB given her chronic heart failure although I will defer to cardiology and we will also need to  "monitor her renal function during diuresis prior to initiation of ACE inhibitor/ARB    Hospital Course     History of Present Illness: Amanda Brown is a 89 y.o. female with a CMH of atrial fibrillation, HTN, GERD, CAD s/p CABG x 4, hx of SVT, HLD, OA who presented to [facility] on 6/13/2025 with  GAYLE and peripheral edema.     Patient reports increasing GAYLE and peripheral edema over the last few days.  She was recently admitted here from 5/25-5/29 with new onset A-fib with RVR and Type II MI.  She underwent heart cath showing patent CABG.  Some medication changes were made including starting Eliquis and amiodarone, increasing metoprolol dose and stopping lisinopril and HCTZ.  Apparently she is supposed to have an OP EVERARDO in the near future.  Last night she had orthopnea and had to sleep with her head propped up.  She has had fatigue and a slight cough.  No CP or palpitations, fevers or chills, dizziness, near-syncope, syncope, urinary sx, GI sx.  Patient says she feels \"fine\".      On arrival to the ED, she was afebrile, , RR 22, blood pressure 166/93, 99% on room air.  Labs are remarkable for BNP 12,425, trop 13, cr 1.14, K 5.7, glucose 155, T bili 1.3, ALT 45, AST 42, hgb 10.9. CXR showed bibasilar infiltrates and small bilateral pleural effusions.  \"    Hospital Course: cardiology was consulted. Started on iv lasix  6/14-Patient states that she is breathing overall better today. She diuresed quite well overnight.     6/15 seen in bed NAD, doing better today, will dc home, condition on dc stable.    DISCHARGE Follow Up Recommendations for labs and diagnostics: follow with pcp in one week    Day of Discharge     Vital Signs:  Temp:  [97.5 °F (36.4 °C)] 97.5 °F (36.4 °C)  Heart Rate:  [] 95  Resp:  [14-22] 14  BP: (110-127)/(63-86) 126/63    Physical Exam   General Appearance:  Alert, cooperative, no distress, appears stated age  Head:   Normocephalic, without obvious abnormality, " atraumatic  Eyes:   PERRL, conjunctiva/corneas clear, EOM's intact, fundi benign, both eyes  Ears:    Normal TM's and external ear canals, both ears  Nose:Nares normal, septum midline, mucosa normal, no drainage or sinus tenderness  Throat:Lips, mucosa, and tongue normal; teeth and gums normal  Neck:Supple, symmetrical, trachea midline, no adenopathy, thyroid: not enlarged, symmetric, no tenderness/mass/nodules, no carotid bruit or JVD  Lungs:             Bibasilar rales, respirations unlabored  Heart:  Regular rate and rhythm, S1, S2 normal, no murmur, rub or gallop  Abdomen:  Soft, non-tender, bowel sounds active all four quadrants,  no masses, no organomegaly  Extremities:Extremities normal, atraumatic, no cyanosis or edema  Pulses:2+ and symmetric  Skin:Skin color, texture, turgor normal, no rashes or lesions      Pertinent  and/or Most Recent Results     LAB RESULTS:      Lab 06/15/25  0411 06/13/25  2347 06/13/25  0812   WBC 8.51 10.37 10.35   HEMOGLOBIN 10.6* 11.3* 10.9*   HEMATOCRIT 32.1* 35.2 34.3   PLATELETS 271 285 308   NEUTROS ABS 5.27 7.02* 7.89*   IMMATURE GRANS (ABS) 0.04 0.07* 0.06*   LYMPHS ABS 2.02 2.31 1.55   MONOS ABS 0.81 0.79 0.61   EOS ABS 0.33 0.13 0.19   MCV 93.6 95.7 97.2*         Lab 06/15/25  0411 06/13/25  2347 06/13/25  1816 06/13/25  0812   SODIUM 140 140 141 139   POTASSIUM 3.3* 3.7 3.4* 5.7*   CHLORIDE 97* 97* 102 103   CO2 29.8* 29.4* 25.1 25.0   ANION GAP 13.2 13.6 13.9 11.0   BUN 23.8* 18.3 17.5 17.9   CREATININE 1.35* 1.20* 0.96 1.14*   EGFR 36.9* 42.6* 55.6* 45.3*   GLUCOSE 99 118* 106* 155*   CALCIUM 8.8 9.0 8.2 9.3   MAGNESIUM 2.0 1.9 1.8  --          Lab 06/13/25  0812   TOTAL PROTEIN 7.0   ALBUMIN 4.1   GLOBULIN 2.9   ALT (SGPT) 45*   AST (SGOT) 42*   BILIRUBIN 1.3*   ALK PHOS 102         Lab 06/13/25  0812   PROBNP 12,425.0*   HSTROP T 13                 Brief Urine Lab Results  (Last result in the past 365 days)        Color   Clarity   Blood   Leuk Est   Nitrite    Protein   CREAT   Urine HCG        05/25/25 1122 Yellow   Clear   Small (1+)   Small (1+)   Negative   Trace                 Microbiology Results (last 10 days)       ** No results found for the last 240 hours. **            XR Chest 1 View  Result Date: 6/13/2025  Impression: Impression: Evidence for developing bibasilar infiltrates and small bilateral pleural effusions. These findings may be related to developing bibasilar pneumonia. Changes of CHF and pulmonary edema could also be considered. Electronically Signed: Enrike Riojas MD  6/13/2025 8:10 AM EDT  Workstation ID: [id number]    CT Angiogram Chest Pulmonary Embolism  Result Date: 5/25/2025  Impression: Impression: No evidence of pulmonary embolism. Cardiomegaly and mild pulmonary edema with small bilateral pleural effusions. Electronically Signed: Arnaldo Napier MD  5/25/2025 4:05 PM EDT  Workstation ID: [id number]      Results for orders placed during the hospital encounter of 05/25/25    Duplex Venous Lower Extremity - Bilateral CAR    Interpretation Summary    Normal bilateral lower extremity venous duplex scan.      Results for orders placed during the hospital encounter of 05/25/25    Duplex Venous Lower Extremity - Bilateral CAR    Interpretation Summary    Normal bilateral lower extremity venous duplex scan.      Results for orders placed during the hospital encounter of 05/25/25    Adult Transthoracic Echo Complete w/ Color, Spectral and Contrast if necessary per protocol    Interpretation Summary    Left ventricular ejection fraction appears to be 56 - 60%.    Left ventricular diastolic function is consistent with (grade III w/high LAP) reversible restrictive pattern.    The left atrial cavity is moderately dilated.    Moderate to severe mitral valve regurgitation is present.    Moderate tricuspid valve regurgitation is present.    Estimated right ventricular systolic pressure from tricuspid regurgitation is moderately elevated (45-55  mmHg).      Labs Pending at Discharge:  Pending Results       Procedure [Order ID] Specimen - Date/Time    Potassium [[id number]]     Specimen: Blood             Procedures Performed           Consults:   Consults       Date and Time Order Name Status Description    6/13/2025 12:22 PM Inpatient Cardiology Consult Completed     6/13/2025  8:58 AM Hospitalist (on-call MD unless specified)      5/25/2025  8:24 AM Inpatient Cardiology Consult Completed             Cardiology assessment and plan        Shortness of breath  Respiratory failure  Acute on chronic diastolic heart failure exacerbation  Atrial fibrillation with rapid ventricular response  Moderate to severe mitral regurgitation  Patient is a 89-year-old female with history of CAD status post remote CABG in 2009, SVT status post cardioversion, hypertension hyperlipidemia, who was initially admitted to the hospital with shortness of breath.  She was recently diagnosed with atrial fibrillation in May 2025, placed on amiodarone and Eliquis.    Close monitoring of intakes and outputs, renal function and daily weights.  Patient and family prefers conservative management at this time  cardiac catheterization with patent grafts  Patient and probably prefers conservative management at this time  Tmax is 97.5 pulse is 95 respirations are 14 blood pressure is 126/60 sats are 95%  Sodium is 140 potassium is 3.3 creatinine is 1.35 hemoglobin is 10.6  Current medications include amiodarone 200 mg p.o. once a day patient is on Lipitor 40 mg p.o. once a day patient is on Lasix 40 mg IV twice daily  Patient is on Toprol-XL 75 mg p.o. once a day patient is on anticoagulation therapy with Eliquis 2.5 mg p.o. twice daily  Switch patient to Toprol-XL 50 mg p.o. twice daily for better control of heart rate  Decrease the dose of Lasix to 20 mg IV twice a day  Continue current medical therapy continue close monitoring and follow-up  Patient likes to go home and follow-up as an  outpatient  Diagnosis and treatment options reviewed and discussed with patient and family  Need for close monitoring and follow-up reviewed and discussed       Discharge Details        Discharge Medications        New Medications        Instructions Start Date   furosemide 20 MG tablet  Commonly known as: Lasix   20 mg, Oral, 2 Times Daily      Magnesium Glycinate 120 MG capsule   120 mg, Oral, 2 Times Daily      nitroglycerin 0.4 MG SL tablet  Commonly known as: NITROSTAT   0.4 mg, Sublingual, Every 5 Minutes PRN, Take no more than 3 doses in 15 minutes.      potassium chloride 10 MEQ CR tablet   10 mEq, Oral, 2 Times Daily      Vitamin D 50 MCG (2000 UT) capsule   2,000 Units, Oral, Daily             Changes to Medications        Instructions Start Date   metoprolol succinate XL 50 MG 24 hr tablet  Commonly known as: TOPROL-XL  What changed:   medication strength  how much to take  when to take this   50 mg, Oral, Every 12 Hours Scheduled             Continue These Medications        Instructions Start Date   amiodarone 200 MG tablet  Commonly known as: PACERONE   Take 1 tablet every 8 hours until 6/2, then take 1 tablet every 12 hours until 6/16, then take 1 tablet daily      aspirin 81 MG EC tablet   81 mg, Daily      atorvastatin 40 MG tablet  Commonly known as: LIPITOR   40 mg, Daily      Eliquis 2.5 MG tablet tablet  Generic drug: apixaban   2.5 mg, Oral, Every 12 Hours Scheduled      Lumigan 0.01 % ophthalmic drops  Generic drug: bimatoprost   1 drop, Nightly      meclizine 25 MG tablet  Commonly known as: ANTIVERT   25 mg, Daily      omeprazole 20 MG capsule  Commonly known as: priLOSEC   20 mg, 4 Times Weekly      timolol 0.5 % ophthalmic solution  Commonly known as: TIMOPTIC   timolol maleate 0.5 % eye drops      VITAMIN B COMPLEX PO   2 Times Weekly               No Known Allergies      Discharge Disposition:   Home or Self Care    Diet:  Hospital:  Diet Order   Procedures    Diet: Cardiac; Healthy  Heart (2-3 Na+); Fluid Consistency: Thin (IDDSI 0)         Discharge Activity:         CODE STATUS:  Code Status and Medical Interventions: CPR (Attempt to Resuscitate); Full Support   Ordered at: 06/13/25 0914     Code Status (Patient has no pulse and is not breathing):    CPR (Attempt to Resuscitate)     Medical Interventions (Patient has pulse or is breathing):    Full Support         No future appointments.    Additional Instructions for the Follow-ups that You Need to Schedule       Discharge Follow-up with PCP   As directed       Currently Documented PCP:    Ranulfo Mmis MD    PCP Phone Number:    [phone]     Follow Up Details: 1 week   Follow Up: 1 Week        Discharge Follow-up with Specialty: Cardiology; 2 Weeks   As directed      Specialty: Cardiology   Follow Up: 2 Weeks                Time spent on Discharge including face to face service:  30 minutes    Signature: Electronically signed by Delvin Chatman MD, 06/15/25, 11:00 EDT.  [facility] Hospitalist Team

## 2025-06-15 NOTE — PLAN OF CARE
Continue to monitor and assess pain.   Patient is alert and oriented with no complaints.   Problem: Adult Inpatient Plan of Care  Goal: Plan of Care Review  Outcome: Progressing  Flowsheets (Taken 6/14/2025 0730)  Progress: improving  Plan of Care Reviewed With:   patient   child  Goal: Patient-Specific Goal (Individualized)  Outcome: Progressing  Goal: Absence of Hospital-Acquired Illness or Injury  Outcome: Progressing  Intervention: Identify and Manage Fall Risk  Flowsheets  Taken 6/15/2025 0400 by Margot Kaba RN  Safety Promotion/Fall Prevention:   fall prevention program maintained   activity supervised   lighting adjusted   room organization consistent   safety round/check completed  Taken 6/14/2025 1848 by Lucille Donaldson RN  Safety Promotion/Fall Prevention:   assistive device/personal items within reach   clutter free environment maintained   safety round/check completed   fall prevention program maintained  Intervention: Prevent Skin Injury  Flowsheets  Taken 6/14/2025 2000 by Margot Kaba RN  Skin Protection: drying agents applied  Taken 6/13/2025 2100 by Tyra Mireles PCT  Body Position: position changed independently  Intervention: Prevent and Manage VTE (Venous Thromboembolism) Risk  Flowsheets (Taken 6/15/2025 0735)  VTE Prevention/Management: (see MAR)   patient refused intervention   SCDs (sequential compression devices) off   other (see comments)  Intervention: Prevent Infection  Flowsheets (Taken 6/15/2025 0735)  Infection Prevention:   hand hygiene promoted   personal protective equipment utilized   rest/sleep promoted   single patient room provided  Goal: Optimal Comfort and Wellbeing  Outcome: Progressing  Intervention: Monitor Pain and Promote Comfort  Flowsheets (Taken 6/14/2025 2000 by Margot Kaba RN)  Pain Management Interventions:   pillow support provided   care clustered  Intervention: Provide Person-Centered Care  Flowsheets (Taken 6/14/2025 2000 by Sesar  KARLA Frost)  Trust Relationship/Rapport:   choices provided   care explained   questions answered   questions encouraged  Goal: Readiness for Transition of Care  Outcome: Progressing  Intervention: Mutually Develop Transition Plan  Flowsheets  Taken 6/13/2025 1241 by Eliazar Anglin, RN  Equipment Currently Used at Home: none  Taken 6/13/2025 1101 by Lucrecia Silva RN  Equipment Needed After Discharge: none  Anticipated Changes Related to Illness: none  Transportation Anticipated: family or friend will provide  Transportation Concerns: none  Concerns to be Addressed: denies needs/concerns at this time  Readmission Within the Last 30 Days: no previous admission in last 30 days  Patient/Family Anticipated Services at Transition: none  Patient/Family Anticipates Transition to: home with family     Problem: Heart Failure  Goal: Optimal Coping  Outcome: Progressing  Intervention: Support Psychosocial Response  Flowsheets (Taken 6/14/2025 2000 by Margot Kaba, RN)  Supportive Measures: active listening utilized  Family/Support System Care: presence promoted  Goal: Optimal Cardiac Output and Blood Flow  Outcome: Progressing  Intervention: Optimize Cardiac Output  Flowsheets (Taken 6/14/2025 2000 by Margot Kaba RN)  Environmental Support: calm environment promoted  Goal: Stable Heart Rate and Rhythm  Outcome: Progressing  Goal: Fluid and Electrolyte Balance  Outcome: Progressing  Intervention: Monitor and Manage Fluid and Electrolyte Balance  Flowsheets (Taken 6/15/2025 0735)  Fluid/Electrolyte Management: electrolyte-binding therapy initiated  Goal: Optimal Functional Ability  Outcome: Progressing  Intervention: Optimize Functional Ability  Flowsheets (Taken 6/13/2025 1226 by Eliazar Anglin, RN)  Activity Management: activity encouraged  Goal: Improved Oral Intake  Outcome: Progressing  Goal: Effective Oxygenation and Ventilation  Outcome: Progressing  Intervention: Promote Airway Secretion  Clearance  Flowsheets  Taken 6/14/2025 2000 by Margot Kaba RN  Cough And Deep Breathing: done independently per patient  Taken 6/13/2025 1226 by Eliazar Anglin RN  Activity Management: activity encouraged  Intervention: Optimize Oxygenation and Ventilation  Flowsheets  Taken 6/14/2025 0858 by Lucille Donaldson RN  Airway/Ventilation Management: airway patency maintained  Taken 6/14/2025 0615 by Chad Mancini, RN  Head of Bed (HOB) Positioning: HOB at 30-45 degrees  Goal: Effective Breathing Pattern During Sleep  Outcome: Progressing  Intervention: Monitor and Manage Obstructive Sleep Apnea  Flowsheets (Taken 6/15/2025 0400 by Margot Kaba RN)  Medication Review/Management: medications reviewed     Problem: Fall Injury Risk  Goal: Absence of Fall and Fall-Related Injury  Outcome: Progressing  Intervention: Identify and Manage Contributors  Flowsheets (Taken 6/15/2025 0400 by Margot Kaba RN)  Medication Review/Management: medications reviewed  Intervention: Promote Injury-Free Environment  Flowsheets  Taken 6/15/2025 0400 by Margot Kaba RN  Safety Promotion/Fall Prevention:   fall prevention program maintained   activity supervised   lighting adjusted   room organization consistent   safety round/check completed  Taken 6/14/2025 1848 by Lucille Donaldson RN  Safety Promotion/Fall Prevention:   assistive device/personal items within reach   clutter free environment maintained   safety round/check completed   fall prevention program maintained     Problem: Comorbidity Management  Goal: Maintenance of Heart Failure Symptom Control  Outcome: Progressing  Intervention: Maintain Heart Failure Management  Flowsheets (Taken 6/15/2025 0400 by Margot Kaba RN)  Medication Review/Management: medications reviewed  Goal: Blood Pressure in Desired Range  Outcome: Progressing  Intervention: Maintain Blood Pressure Management  Flowsheets (Taken 6/15/2025 0400 by Margot Kaba RN)  Medication  Review/Management: medications reviewed   Goal Outcome Evaluation:  Plan of Care Reviewed With: patient, child        Progress: improving

## 2025-06-15 NOTE — PROGRESS NOTES
Cardiology [facility]      Patient Care Team:  Ranulfo Mims MD as PCP - General (Internal Medicine)  Deep Aceves MD as Consulting Physician (Cardiology)        Cardiology assessment and plan      Shortness of breath  Respiratory failure  Acute on chronic diastolic heart failure exacerbation  Atrial fibrillation with rapid ventricular response  Moderate to severe mitral regurgitation  Patient is a 89-year-old female with history of CAD status post remote CABG in 2009, SVT status post cardioversion, hypertension hyperlipidemia, who was initially admitted to the hospital with shortness of breath.  She was recently diagnosed with atrial fibrillation in May 2025, placed on amiodarone and Eliquis.    Close monitoring of intakes and outputs, renal function and daily weights.  Patient and family prefers conservative management at this time  cardiac catheterization with patent grafts  Patient and probably prefers conservative management at this time  Tmax is 97.5 pulse is 95 respirations are 14 blood pressure is 126/60 sats are 95%  Sodium is 140 potassium is 3.3 creatinine is 1.35 hemoglobin is 10.6  Current medications include amiodarone 200 mg p.o. once a day patient is on Lipitor 40 mg p.o. once a day patient is on Lasix 40 mg IV twice daily  Patient is on Toprol-XL 75 mg p.o. once a day patient is on anticoagulation therapy with Eliquis 2.5 mg p.o. twice daily  Switch patient to Toprol-XL 50 mg p.o. twice daily for better control of heart rate  Decrease the dose of Lasix to 20 mg IV twice a day  Continue current medical therapy continue close monitoring and follow-up  Patient likes to go home and follow-up as an outpatient  Diagnosis and treatment options reviewed and discussed with patient and family  Need for close monitoring and follow-up reviewed and discussed                        Chief Complaint: Shortness of breath    Subjective no new complaints    Interval History: No significant change in the overall  "status    History taken from: patient    Review of Systems:  Review of Systems   Constitutional: Negative for chills, decreased appetite and malaise/fatigue.   HENT:  Negative for congestion and nosebleeds.    Eyes:  Negative for blurred vision and double vision.   Cardiovascular:  Positive for dyspnea on exertion and irregular heartbeat. Negative for chest pain, leg swelling, near-syncope, orthopnea, palpitations and paroxysmal nocturnal dyspnea.   Respiratory:  Positive for shortness of breath. Negative for cough.    Hematologic/Lymphatic: Negative for adenopathy. Does not bruise/bleed easily.   Skin:  Negative for color change and rash.   Musculoskeletal:  Negative for back pain and joint pain.   Gastrointestinal:  Negative for bloating, abdominal pain, hematemesis and hematochezia.   Genitourinary:  Negative for flank pain and hematuria.   Neurological:  Negative for dizziness and focal weakness.   Psychiatric/Behavioral:  Negative for altered mental status. The patient does not have insomnia.        Objective    Vital Signs  Visit Vitals  /63   Pulse 95   Temp 97.5 °F (36.4 °C) (Oral)   Resp 14   Ht 154.9 cm (61\")   Wt 65 kg (143 lb 4.8 oz)   SpO2 95%   BMI 27.08 kg/m²     Oxygen Therapy  SpO2: 95 %  Pulse Oximetry Type: Continuous  Device (Oxygen Therapy): room air  Flow (L/min) (Oxygen Therapy): 1  Flowsheet Rows      Flowsheet Row First Filed Value   Admission Height 154.9 cm (61\") Documented at 06/13/2025 0711   Admission Weight 64.3 kg (141 lb 12.1 oz) Documented at 06/13/2025 0711          Intake & Output (last 3 days)         06/12 0701  06/13 0700 06/13 0701  06/14 0700 06/14 0701  06/15 0700 06/15 0701  06/16 0700    P.O.  240 360     I.V. (mL/kg)   0 (0)     Total Intake(mL/kg)  240 (3.8) 360 (5.5)     Urine (mL/kg/hr)  2300 1300 (0.8)     Stool  0      Total Output  2300 1300     Net  -2060 -940             Urine Unmeasured Occurrence  2 x      Stool Unmeasured Occurrence  0 x            Lines, " Drains & Airways       Active LDAs       Name Placement date Placement time Site Days    Peripheral IV 06/13/25 0809 20 G Left Antecubital 06/13/25  0809  Antecubital  1    External Urinary Catheter 06/13/25  1226  --  1                    Physical Exam:  Constitutional:       Appearance: Well-developed.   Eyes:      Conjunctiva/sclera: Conjunctivae normal.      Pupils: Pupils are equal, round, and reactive to light.   HENT:      Head: Normocephalic and atraumatic.   Neck:      Thyroid: No thyromegaly.   Pulmonary:      Effort: Pulmonary effort is normal.      Breath sounds: Examination of the right-lower field reveals decreased breath sounds. Examination of the left-lower field reveals decreased breath sounds. Decreased breath sounds present.   Cardiovascular:      Abnormal PMI. Tachycardia present. Irregularly irregular rhythm.      Murmurs: There is a grade 3/6 holosystolic murmur.      S3 and S4 Gallop.    Pulses:     Intact distal pulses.   Edema:     Peripheral edema absent.   Abdominal:      General: Bowel sounds are normal.      Palpations: Abdomen is soft.   Musculoskeletal:      Cervical back: Normal range of motion and neck supple. Skin:     General: Skin is warm.   Neurological:      Mental Status: Alert and oriented to person, place, and time.         Results Review:     I reviewed the patient's new clinical results.    Lab Results (last 24 hours)       Procedure Component Value Units Date/Time    Basic Metabolic Panel [[id number]]  (Abnormal) Collected: 06/15/25 0411    Specimen: Blood Updated: 06/15/25 0500     Glucose 99 mg/dL      BUN 23.8 mg/dL      Creatinine 1.35 mg/dL      Sodium 140 mmol/L      Potassium 3.3 mmol/L      Chloride 97 mmol/L      CO2 29.8 mmol/L      Calcium 8.8 mg/dL      BUN/Creatinine Ratio 17.6     Anion Gap 13.2 mmol/L      eGFR 36.9 mL/min/1.73     Narrative:      GFR Categories in Chronic Kidney Disease (CKD)              GFR Category          GFR (mL/min/1.73)     Interpretation  G1                    90 or greater        Normal or high (1)  G2                    60-89                Mild decrease (1)  G3a                   45-59                Mild to moderate decrease  G3b                   30-44                Moderate to severe decrease  G4                    15-29                Severe decrease  G5                    14 or less           Kidney failure    (1)In the absence of evidence of kidney disease, neither GFR category G1 or G2 fulfill the criteria for CKD.    eGFR calculation 2021 CKD-EPI creatinine equation, which does not include race as a factor    Magnesium [[id number]]  (Normal) Collected: 06/15/25 0411    Specimen: Blood Updated: 06/15/25 0500     Magnesium 2.0 mg/dL     CBC & Differential [[id number]]  (Abnormal) Collected: 06/15/25 0411    Specimen: Blood Updated: 06/15/25 0435    Narrative:      The following orders were created for panel order CBC & Differential.  Procedure                               Abnormality         Status                     ---------                               -----------         ------                     CBC Auto Differential[[id number]]        Abnormal            Final result                 Please view results for these tests on the individual orders.    CBC Auto Differential [[id number]]  (Abnormal) Collected: 06/15/25 0411    Specimen: Blood Updated: 06/15/25 0435     WBC 8.51 10*3/mm3      RBC 3.43 10*6/mm3      Hemoglobin 10.6 g/dL      Hematocrit 32.1 %      MCV 93.6 fL      MCH 30.9 pg      MCHC 33.0 g/dL      RDW 14.1 %      RDW-SD 46.5 fl      MPV 10.4 fL      Platelets 271 10*3/mm3      Neutrophil % 61.9 %      Lymphocyte % 23.7 %      Monocyte % 9.5 %      Eosinophil % 3.9 %      Basophil % 0.5 %      Immature Grans % 0.5 %      Neutrophils, Absolute 5.27 10*3/mm3      Lymphocytes, Absolute 2.02 10*3/mm3      Monocytes, Absolute 0.81 10*3/mm3      Eosinophils, Absolute 0.33 10*3/mm3      Basophils, Absolute 0.04  10*3/mm3      Immature Grans, Absolute 0.04 10*3/mm3      nRBC 0.0 /100 WBC           Results for orders placed during the hospital encounter of 05/25/25    Adult Transthoracic Echo Complete w/ Color, Spectral and Contrast if necessary per protocol    Interpretation Summary    Left ventricular ejection fraction appears to be 56 - 60%.    Left ventricular diastolic function is consistent with (grade III w/high LAP) reversible restrictive pattern.    The left atrial cavity is moderately dilated.    Moderate to severe mitral valve regurgitation is present.    Moderate tricuspid valve regurgitation is present.    Estimated right ventricular systolic pressure from tricuspid regurgitation is moderately elevated (45-55 mmHg).        Medication Review:   I have reviewed the patient's current medication list  Scheduled Meds:amiodarone, 200 mg, Oral, BID With Meals  apixaban, 2.5 mg, Oral, Q12H  aspirin, 81 mg, Oral, Daily  atorvastatin, 40 mg, Oral, Daily  furosemide, 40 mg, Intravenous, BID Diuretics  metoprolol succinate XL, 50 mg, Oral, Q12H  pantoprazole, 40 mg, Oral, Daily  potassium chloride ER, 20 mEq, Oral, Once  sodium chloride, 10 mL, Intravenous, Q12H  timolol, 1 drop, Both Eyes, Daily      Continuous Infusions:   PRN Meds:.  acetaminophen **OR** acetaminophen **OR** acetaminophen    senna-docusate sodium **AND** polyethylene glycol **AND** bisacodyl **AND** bisacodyl    Calcium Replacement - Follow Nurse / BPA Driven Protocol    Magnesium Cardiology Dose Replacement - Follow Nurse / BPA Driven Protocol    melatonin    nitroglycerin    ondansetron ODT **OR** ondansetron    Phosphorus Replacement - Follow Nurse / BPA Driven Protocol    Potassium Replacement - Follow Nurse / BPA Driven Protocol    [COMPLETED] Insert Peripheral IV **AND** sodium chloride    sodium chloride    sodium chloride    ECG/EMG Results (last 24 hours)       Procedure Component Value Units Date/Time    Telemetry Scan [[id number]] Resulted:  06/14/25 1051     Updated: 06/14/25 1120    ECG 12 Lead Dyspnea [[id number]] Collected: 06/13/25 0716     Updated: 06/14/25 1247     QT Interval 408 ms      QTC Interval 534 ms     Narrative:      HEART AQCV=598  bpm  RR Wuwgnwfi=399  ms  AZ Interval=  ms  P Horizontal Axis=  deg  P Front Axis=  deg  QRSD Fjoxpoqa=153  ms  QT Yylbapvv=577  ms  CSqF=577  ms  QRS Axis=48  deg  T Wave Axis=-39  deg  - ABNORMAL ECG -  Atrial fibrillation  Right bundle branch block  Prolonged QT interval  When compared with ECG of 29-May-2025 05:39:49,  Significant change in rhythm: previously atrial fibrillation  Electronically Signed By: Anton Maloney (SELVIN) 2025-06-14 12:47:00  Date and Time of Study:2025-06-13 07:16:49            Imaging Results (Last 24 Hours)       ** No results found for the last 24 hours. **          Results for orders placed during the hospital encounter of 05/25/25    Cardiac Catheterization/Vascular Study    Conclusion  OPERATORS  Win Garzon M.D. (Attending Cardiologist)      PROCEDURES PERFORMED  Ultrasound guided Vascular access  Left Heart Catheterization  Coronary Angiogram  Bypass angiogram  Moderate sedation    INDICATIONS FOR PROCEDURE  89 years old woman with multiple cardiovascular risk factors, known coronary disease with previous CABG presented with chest pain, A-fib RVR, significantly elevated troponin increasing up to 600.  After discussing the risk and benefits of the procedure she was offered diagnostic coronary angiography.    PROCEDURE IN DETAIL  Informed consent was obtained from the patient after explaining the risks, benefits, and alternative options of the procedure. After obtaining informed consent, the patient was brought to the cath lab and was prepped in a sterile fashion. Lidocaine 2% was used for local anesthesia into the right femoral access site. The right femoral artery was accessed with a micropuncture needle via modified Seldinger technique under ultrasound guidance. A 6F was  inserted successfully. Afterwards, 6F JR4 and JL4 diagnostic catheters were advanced over a wire into the ascending aorta and were used to engage the ostia of the left main and RCA respectively. JR4 used to cross the AV and obtain LV pressures and gradient across the AV measured via pullback technique.  JR4 catheter was also used to selectively engage the vein graft supplying the RCA and left circumflex.  JR4 was used to selectively engage left subclavian artery and this catheter was exchanged for IM catheter.  Selective IM angiography was performed.  Images of the right and left coronary systems were obtained. All the catheters were exchanged over a wire and subsequently removed.  The patient tolerated the procedure well without any complications. The pictures were reviewed at the end of the procedure.  Sheath was left in place for removal and manual pressure    HEMODYNAMICS    LV: 118/14, 21 mmHg  AO: 113/53, 80 mmHg  Gradient: No significant gradient across the aortic valve during pullback of JR4 catheter.  LV gram was not performed due to recently available echocardiogram and chronic kidney disease    FINDINGS  Coronary Angiogram    Right dominant circulation    Left main: There is no left main.  Separate ostia for left circumflex and LAD.    Left Anterior Descending Artery: LAD is patent in the proximal segment and provides a medium caliber tortuous diagonal vessel.  Mid LAD is 100% occluded    Left Circumflex: Left circumflex artery is 100% occluded in the proximal segment    Right Coronary Artery: The RCA is 100% occluded in the proximal segment    Bypass angiography  Vein graft supplying the RCA is widely patent at the origin, body and anastomosis  Vein graft supplying the left circumflex artery is widely patent at the origin, body and anastomosis  LIMA to LAD is widely patent at the origin, body and anastomosis    ESTIMATED BLOOD LOSS:  10 ml    COMPLICATIONS:  None    PROCEDURE DATA:  Contrast Used: 50  cc  Sedation Time: 30 minutes    IMPRESSIONS  Severe three-vessel native obstructive coronary disease  Patent LIMA to LAD, vein graft to LCx and vein graft to RCA  Elevated LVEDP    RECOMMENDATIONS  - Diuresis, management of A-fib RVR  -Medical management of coronary artery disease  -Workup for transcatheter [id number] mitral valve repair    Electronically signed by Win Garzon MD, 05/26/25, 2:40 PM EDT.     Results for orders placed during the hospital encounter of 05/25/25    Adult Transthoracic Echo Complete w/ Color, Spectral and Contrast if necessary per protocol    Interpretation Summary    Left ventricular ejection fraction appears to be 56 - 60%.    Left ventricular diastolic function is consistent with (grade III w/high LAP) reversible restrictive pattern.    The left atrial cavity is moderately dilated.    Moderate to severe mitral valve regurgitation is present.    Moderate tricuspid valve regurgitation is present.    Estimated right ventricular systolic pressure from tricuspid regurgitation is moderately elevated (45-55 mmHg).     Lab Results   Component Value Date    GLUCOSE 99 06/15/2025    BUN 23.8 (H) 06/15/2025    CREATININE 1.35 (H) 06/15/2025    EGFR 36.9 (L) 06/15/2025    BCR 17.6 06/15/2025    K 3.3 (L) 06/15/2025    CO2 29.8 (H) 06/15/2025    CALCIUM 8.8 06/15/2025    ALBUMIN 4.1 06/13/2025    BILITOT 1.3 (H) 06/13/2025    AST 42 (H) 06/13/2025    ALT 45 (H) 06/13/2025      Lab Results   Component Value Date    CHOL 139 05/25/2025    TRIG 61 05/25/2025    HDL 45 05/25/2025    LDL 81 05/25/2025      Lab Results   Component Value Date    TROPONINT 13 06/13/2025        Assessment & Plan       Acute CHF        Millie Garcia MD  06/15/25  08:22 EDT

## 2025-06-16 NOTE — CASE MANAGEMENT/SOCIAL WORK
Case Management Discharge Note      Final Note: Routine home         Selected Continued Care - Discharged on 6/15/2025 Admission date: 6/13/2025 - Discharge disposition: Home or Self Care         Transportation Services  Private: Car    Final Discharge Disposition Code: 01 - home or self-care

## 2025-06-16 NOTE — OUTREACH NOTE
Prep Survey      Flowsheet Row Responses   [facility] discharged [facility]? [facility]   Is LACE score < 7 ? Yes   Eligibility Readm Mgmt   Discharge diagnosis Acute CHF   Does the patient have one of the following disease processes/diagnoses(primary or secondary)? CHF   Does the patient have Home health ordered? No   Is there a DME ordered? No   Prep survey completed? Yes            Purnima SAM - Registered Nurse

## 2025-06-20 ENCOUNTER — READMISSION MANAGEMENT (OUTPATIENT)
Dept: CALL CENTER | Facility: HOSPITAL | Age: OVER 89
End: 2025-06-20
Payer: MEDICARE

## 2025-06-20 NOTE — OUTREACH NOTE
CHF Week 1 Survey      Flowsheet Row Responses   [facility] patient discharged from? [facility]   Does the patient have one of the following disease processes/diagnoses(primary or secondary)? CHF   CHF Week 1 attempt successful? Yes   Call start time 1533   Call end time 1544   Person spoke with today (if not patient) and relationship Daughter- Lizabeth and patient   Meds reviewed with patient/caregiver? Yes   Is the patient having any side effects they believe may be caused by any medication additions or changes? No   Does the patient have all medications ordered at discharge? Yes   Is the patient taking all medications as directed (includes completed medication regime)? Yes   Does the patient have a primary care provider?  Yes   Does the patient have an appointment with their PCP within 7 days of discharge? Yes   Comments regarding PCP Has a followup next week with Provider   Has the patient kept scheduled appointments due by today? N/A   Psychosocial issues? No   Did the patient receive a copy of their discharge instructions? Yes   Nursing interventions Reviewed instructions with patient   What is the patient's perception of their health status since discharge? Improving   Nursing interventions Nurse provided patient education   Is the patient able to teach back signs and symptoms of worsening condition? (i.e. weight gain, shortness of air, etc.) Yes   If the patient is a current smoker, are they able to teach back resources for cessation? Not a smoker   Is the patient/caregiver able to teach back the hierarchy of who to call/visit for symptoms/problems? PCP, Specialist, Home health nurse, Urgent Care, ED, 911 Yes   CHF Zone this Call Green Zone   Green Zone Patient reports doing well, No new or worsening shortness of breath, No new swelling -  feet, ankles and legs look normal for you   Green Zone Interventions Follow up visits planned, Meds as directed    CHF Week 1 call completed? Yes   Revoked No further  contact(revokes)-requires comment   Is the patient interested in additional calls from an ambulatory ? No   Would this patient benefit from a Referral to [facility] Social Work? No   Wrap up additional comments Patient has excellent support from family /daughter . They are watching her blood pressure closely with changes in medications. She has been tired since discharge adjusting to medications.  She will followup with Provider next week.   Call end time [id number]            Daniel HERNANDEZ - Registered Nurse

## 2025-06-23 ENCOUNTER — TELEPHONE (OUTPATIENT)
Dept: CARDIOLOGY | Facility: CLINIC | Age: OVER 89
End: 2025-06-23

## 2025-06-23 NOTE — TELEPHONE ENCOUNTER
Caller: ZEINAB BARRY    Relationship: Emergency Contact    Best call back number: [phone]     What are your concerns: PT CAME HOME LAST SUNDAY FROM THE HOSPITAL AND HER DAUGHTER IS WANTING TO CALL [facility] READINGS IN TO MAKE SURE THEY LOOK OKAY TO DR YI - HER DAUGHTER NOTES SHES BEEN SLEEPING A LOT AND BEEN VERY FATIGUED, SHE SEES HER PCP TOMORROW BUT THERE WERE MEDICATION CHANGES MADE IN THE HOSPITAL BY INTERNALIST THEY ARE FOLLOWING UP ON - PT IS SCHEDULED TO SEE CARDIO IN [place] IN A WEEK -     MORNING PILLS @ 0830  EVENING PILLS @ 2030 06.16.25  1125 109/68 88  128.8LBS    06.17.25  1300 103/64 106BPM  1730 111/59 106  2050 129/60 102  127.0 LBS    06.18.25  0730 128/85 117  2200 137/72 91  126.2LBS    06.19.25  0845 111/68 82  1300 106/60 100  1515 100/68 89  2230 127/87 109  FORGOT TO WEIGH    06.20.25  0700 121/67 106  2045 110/73 104  125.2LBS     06.21.25  0700 106/57 111  1145 108/64 107  2120 123/68 98  124.8 LBS    06.22.25  0830 115/70 99  1800 109/64 97  2120 132/76 101  FORGOT TO WEIGH    06.23.25  0715 117/64 105  1100 105/62 95  124.8LBS

## 2025-06-30 ENCOUNTER — OFFICE VISIT (OUTPATIENT)
Dept: CARDIOLOGY | Facility: CLINIC | Age: OVER 89
End: 2025-06-30
Payer: MEDICARE

## 2025-06-30 VITALS
OXYGEN SATURATION: 97 % | BODY MASS INDEX: 24.15 KG/M2 | WEIGHT: 123 LBS | HEART RATE: 86 BPM | DIASTOLIC BLOOD PRESSURE: 59 MMHG | HEIGHT: 60 IN | SYSTOLIC BLOOD PRESSURE: 117 MMHG

## 2025-06-30 DIAGNOSIS — I10 BENIGN ESSENTIAL HTN: Chronic | ICD-10-CM

## 2025-06-30 DIAGNOSIS — E78.2 MIXED HYPERLIPIDEMIA: Chronic | ICD-10-CM

## 2025-06-30 DIAGNOSIS — I25.10 CORONARY ARTERY DISEASE INVOLVING NATIVE CORONARY ARTERY OF NATIVE HEART WITHOUT ANGINA PECTORIS: Primary | Chronic | ICD-10-CM

## 2025-06-30 DIAGNOSIS — I48.0 PAROXYSMAL ATRIAL FIBRILLATION WITH RAPID VENTRICULAR RESPONSE: ICD-10-CM

## 2025-06-30 PROCEDURE — 1160F RVW MEDS BY RX/DR IN RCRD: CPT | Performed by: INTERNAL MEDICINE

## 2025-06-30 PROCEDURE — 99214 OFFICE O/P EST MOD 30 MIN: CPT | Performed by: INTERNAL MEDICINE

## 2025-06-30 PROCEDURE — 1159F MED LIST DOCD IN RCRD: CPT | Performed by: INTERNAL MEDICINE

## 2025-06-30 RX ORDER — POTASSIUM CHLORIDE 750 MG/1
10 TABLET, EXTENDED RELEASE ORAL 2 TIMES DAILY
Qty: 60 TABLET | Refills: 0 | Status: SHIPPED | OUTPATIENT
Start: 2025-06-30

## 2025-06-30 RX ORDER — METOPROLOL SUCCINATE 50 MG/1
50 TABLET, EXTENDED RELEASE ORAL EVERY 12 HOURS SCHEDULED
Qty: 60 TABLET | Refills: 6 | Status: SHIPPED | OUTPATIENT
Start: 2025-06-30

## 2025-06-30 RX ORDER — FUROSEMIDE 20 MG/1
20 TABLET ORAL 2 TIMES DAILY
Qty: 180 TABLET | Refills: 1 | Status: SHIPPED | OUTPATIENT
Start: 2025-06-30

## 2025-06-30 NOTE — PROGRESS NOTES
Date of Office Visit: 2025  Encounter Provider: Deep Aceves MD  Place of Service: [facility] CARDIOLOGY [place]  Patient Name: Amanda Brown  :6/10/1933  Ranulfo Mims MD    Chief Complaint   Patient presents with    Follow-up     2 week hosp f/u     History of Present Illness  I am pleased to see Mrs. Brown in my office today as a follow-up.     As you know, patient is a 89-year-old white female whose past medical history significant for hypertension, hyperlipidemia, CAD, CABG, who came today for follow-up.     In 2021, patient had fall due to tripping at local [employer] store.  She broke her left shoulder/humerus.  Patient was recommended to have surgery but she was reluctant and was treated conservatively.  Patient follows with Dr. Aguila.  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     In 2023, patient was admitted at [facility] with symptom of palpitation and was noted to have narrow complex tachycardia consistent with SVT.  Patient was cardioverted back into sinus rhythm with Cardizem.  Patient was referred to Dr. Kelli Mohr and she was noted to be in SVT.  Patient was offered ablation but family and the patient refused.  In 2024, patient was again admitted at [facility] with symptom of SVT.  It was aborted.  Patient was discharged home on beta-blocker.    In 2025, patient was admitted in the [facility] with congestive heart failure.  She was found to be in atrial fibrillation.  Patient underwent cardiac catheterization which showed LAD was 100% occluded in the midsegment.  LCx was 100% occluded.  RCA was 100% occluded.  Vein graft to PDA was patent SVG graft to LCx is patent.  LIMA to LAD was patent.  Patient was started on amiodarone and Eliquis and Toprol-XL and was discharged with intention of cardioversion.    Patient came today and feeling much better.  She is not in congestive heart failure.  No  JVD or crackles.  No leg edema.  No orthopnea PND    I talked to her about possibility of cardioversion.  She wants to defer the cardioversion.  She is reluctant.  I advised her to call my office if she is agreeable in next 1 to 2 months.  If she does not want to have cardioversion then rate control and anticoagulation strategy would be adopted.      Past Medical History:   Diagnosis Date    Benign essential HTN 11/30/2021    Closed fracture of surgical neck of humerus 02/21/2022    Closed supracondylar fracture of left humerus 11/30/2021    Coronary artery disease involving native coronary artery of native heart without angina pectoris 03/27/2016    Fracture, humerus 2021    left    GERD (gastroesophageal reflux disease)     Mixed hyperlipidemia 09/27/2015    Myocardial infarction     Osteoarthritis 11/30/2021    Paroxysmal SVT (supraventricular tachycardia) 01/07/2024    Pneumonia due to infectious organism 01/07/2024    PONV (postoperative nausea and vomiting)          Past Surgical History:   Procedure Laterality Date    ARTERIAL BYPASS SURGERY      CARDIAC CATHETERIZATION      CARDIAC CATHETERIZATION Right 5/26/2025    Procedure: Left Heart Cath;  Surgeon: iWn Garzon MD;  Location: [facility] CATH INVASIVE LOCATION;  Service: Cardiovascular;  Laterality: Right;    CARDIAC SURGERY  2009    CABG 4V    CORONARY ARTERY BYPASS GRAFT             Current Outpatient Medications:     amiodarone (PACERONE) 200 MG tablet, Take 1 tablet every 8 hours until 6/2, then take 1 tablet every 12 hours until 6/16, then take 1 tablet daily, Disp: 120 tablet, Rfl: 0    apixaban (ELIQUIS) 2.5 MG tablet tablet, Take 1 tablet by mouth Every 12 (Twelve) Hours. Indications: Atrial Fibrillation, Disp: 180 tablet, Rfl: 1    atorvastatin (LIPITOR) 40 MG tablet, Take 1 tablet by mouth Daily., Disp: , Rfl:     B Complex Vitamins (VITAMIN B COMPLEX PO), Take  by mouth 2 (Two) Times a Week., Disp: , Rfl:     Cholecalciferol (Vitamin D) 50 MCG  (2000 UT) capsule, Take 1 capsule by mouth Daily., Disp: 30 capsule, Rfl: 0    empagliflozin (JARDIANCE) 10 MG tablet tablet, Take 1 tablet by mouth Daily., Disp: 30 tablet, Rfl: 1    furosemide (Lasix) 20 MG tablet, Take 1 tablet by mouth 2 (Two) Times a Day., Disp: 180 tablet, Rfl: 1    Lumigan 0.01 % ophthalmic drops, Administer 1 drop to both eyes Every Night., Disp: , Rfl:     Magnesium Glycinate 120 MG capsule, Take 120 mg by mouth 2 (Two) Times a Day., Disp: 60 capsule, Rfl: 0    meclizine (ANTIVERT) 25 MG tablet, Take 1 tablet by mouth Daily., Disp: , Rfl:     metoprolol succinate XL (TOPROL-XL) 50 MG 24 hr tablet, Take 1 tablet by mouth Every 12 (Twelve) Hours., Disp: 60 tablet, Rfl: 6    nitroglycerin (NITROSTAT) 0.4 MG SL tablet, Place 1 tablet under the tongue Every 5 (Five) Minutes As Needed for Chest Pain. Take no more than 3 doses in 15 minutes., Disp: 30 tablet, Rfl: 12    omeprazole (priLOSEC) 20 MG capsule, Take 1 capsule by mouth 4 (Four) Times a Week., Disp: , Rfl:     potassium chloride 10 MEQ CR tablet, Take 1 tablet by mouth 2 (Two) Times a Day., Disp: 60 tablet, Rfl: 0    timolol (TIMOPTIC) 0.5 % ophthalmic solution, timolol maleate 0.5 % eye drops, Disp: , Rfl:       Social History     Socioeconomic History    Marital status:    Tobacco Use    Smoking status: Never     Passive exposure: Never    Smokeless tobacco: Never   Vaping Use    Vaping status: Never Used   Substance and Sexual Activity    Alcohol use: Never    Drug use: Never    Sexual activity: Not Currently     Birth control/protection: Post-menopausal         Review of Systems   Constitutional: Negative for chills and fever.   HENT:  Negative for ear discharge and nosebleeds.    Eyes:  Negative for discharge and redness.   Cardiovascular:  Negative for chest pain, orthopnea, palpitations, paroxysmal nocturnal dyspnea and syncope.   Respiratory:  Negative for cough, shortness of breath and wheezing.    Endocrine: Negative for  "heat intolerance.   Skin:  Negative for rash.   Musculoskeletal:  Negative for arthritis and myalgias.   Gastrointestinal:  Negative for abdominal pain, melena, nausea and vomiting.   Genitourinary:  Negative for dysuria and hematuria.   Neurological:  Negative for dizziness, light-headedness, numbness and tremors.   Psychiatric/Behavioral:  Negative for depression. The patient is not nervous/anxious.        Procedures    Procedures    No orders to display           Objective:    /59 (BP Location: Left arm, Patient Position: Sitting, Cuff Size: Adult)   Pulse 86   Ht 152.4 cm (60\")   Wt 55.8 kg (123 lb)   SpO2 97%   BMI 24.02 kg/m²         Constitutional:       Appearance: Well-developed.   Eyes:      General: No scleral icterus.        Right eye: No discharge.   HENT:      Head: Normocephalic and atraumatic.   Neck:      Thyroid: No thyromegaly.      Lymphadenopathy: No cervical adenopathy.   Pulmonary:      Effort: Pulmonary effort is normal. No respiratory distress.      Breath sounds: Normal breath sounds. No wheezing. No rales.   Cardiovascular:      Normal rate. Irregularly irregular rhythm.      No gallop.    Edema:     Peripheral edema absent.   Abdominal:      Tenderness: There is no abdominal tenderness.   Skin:     Findings: No erythema or rash.   Neurological:      Mental Status: Alert and oriented to person, place, and time.             Assessment:       Diagnosis Plan   1. Coronary artery disease involving native coronary artery of native heart without angina pectoris  apixaban (ELIQUIS) 2.5 MG tablet tablet    furosemide (Lasix) 20 MG tablet    metoprolol succinate XL (TOPROL-XL) 50 MG 24 hr tablet    potassium chloride 10 MEQ CR tablet      2. Mixed hyperlipidemia  apixaban (ELIQUIS) 2.5 MG tablet tablet    furosemide (Lasix) 20 MG tablet    metoprolol succinate XL (TOPROL-XL) 50 MG 24 hr tablet    potassium chloride 10 MEQ CR tablet      3. Benign essential HTN  apixaban (ELIQUIS) 2.5 MG " tablet tablet    furosemide (Lasix) 20 MG tablet    metoprolol succinate XL (TOPROL-XL) 50 MG 24 hr tablet    potassium chloride 10 MEQ CR tablet      4. Paroxysmal atrial fibrillation with rapid ventricular response  apixaban (ELIQUIS) 2.5 MG tablet tablet    furosemide (Lasix) 20 MG tablet    metoprolol succinate XL (TOPROL-XL) 50 MG 24 hr tablet    potassium chloride 10 MEQ CR tablet               Plan:       M DM:    1.  Persistent atrial fibrillation:    Patient is on Eliquis.  Unfortunately patient does not want to proceed with cardioversion at present.  She wants to think about it.    2.  Hypertension:    Blood pressure is controlled    3.  CAD/CABG:    Recent cardiac catheterization showed patent grafts.  Medical treatment recommended    4.  Mixed hyperlipidemia:    Patient is on Lipitor.

## 2025-07-27 DIAGNOSIS — I10 BENIGN ESSENTIAL HTN: Chronic | ICD-10-CM

## 2025-07-27 DIAGNOSIS — I48.0 PAROXYSMAL ATRIAL FIBRILLATION WITH RAPID VENTRICULAR RESPONSE: ICD-10-CM

## 2025-07-27 DIAGNOSIS — I25.10 CORONARY ARTERY DISEASE INVOLVING NATIVE CORONARY ARTERY OF NATIVE HEART WITHOUT ANGINA PECTORIS: Chronic | ICD-10-CM

## 2025-07-27 DIAGNOSIS — E78.2 MIXED HYPERLIPIDEMIA: Chronic | ICD-10-CM

## 2025-07-28 RX ORDER — POTASSIUM CHLORIDE 750 MG/1
10 TABLET, EXTENDED RELEASE ORAL 2 TIMES DAILY
Qty: 60 TABLET | Refills: 0 | Status: SHIPPED | OUTPATIENT
Start: 2025-07-28

## 2025-08-27 DIAGNOSIS — E78.2 MIXED HYPERLIPIDEMIA: Chronic | ICD-10-CM

## 2025-08-27 DIAGNOSIS — I48.0 PAROXYSMAL ATRIAL FIBRILLATION WITH RAPID VENTRICULAR RESPONSE: ICD-10-CM

## 2025-08-27 DIAGNOSIS — I10 BENIGN ESSENTIAL HTN: Chronic | ICD-10-CM

## 2025-08-27 DIAGNOSIS — I25.10 CORONARY ARTERY DISEASE INVOLVING NATIVE CORONARY ARTERY OF NATIVE HEART WITHOUT ANGINA PECTORIS: Chronic | ICD-10-CM

## 2025-08-27 RX ORDER — POTASSIUM CHLORIDE 750 MG/1
10 TABLET, EXTENDED RELEASE ORAL 2 TIMES DAILY
Qty: 180 TABLET | Refills: 1 | Status: SHIPPED | OUTPATIENT
Start: 2025-08-27

## (undated) DEVICE — CVR PROB ULTRASND CIVFLEX GEN/PURP TELESCP/FOLD 5.5X96IN LF

## (undated) DEVICE — DGW .035 FC J3MM 260CM TEF: Brand: EMERALD

## (undated) DEVICE — CATH DIAG IMPULSE IM 5F 100CM

## (undated) DEVICE — CATH DIAG IMPULSE FL4 5F 100CM

## (undated) DEVICE — ST ACC MICROPUNCTURE STFF/CANN PLAT/TP 4F 21G 40CM

## (undated) DEVICE — ELECTRD DEFIB M/FUNC PROPADZ RADIOL 2PK

## (undated) DEVICE — PINNACLE INTRODUCER SHEATH: Brand: PINNACLE

## (undated) DEVICE — CATH DIAG IMPULSE FR4 5F 100CM

## (undated) DEVICE — DGW .035 FC J3MM 150CM TEF HEP: Brand: EMERALD

## (undated) DEVICE — PK TRY HEART CATH 50